# Patient Record
Sex: MALE | Race: WHITE | Employment: OTHER | URBAN - METROPOLITAN AREA
[De-identification: names, ages, dates, MRNs, and addresses within clinical notes are randomized per-mention and may not be internally consistent; named-entity substitution may affect disease eponyms.]

---

## 2017-01-05 RX ORDER — LEVOTHYROXINE SODIUM 88 UG/1
88 TABLET ORAL
Qty: 90 TABLET | Refills: 0 | Status: SHIPPED | OUTPATIENT
Start: 2017-01-05 | End: 2017-04-17

## 2017-01-25 ENCOUNTER — HOSPITAL ENCOUNTER (OUTPATIENT)
Dept: CV DIAGNOSTICS | Age: 72
Discharge: HOME | End: 2017-01-25
Attending: INTERNAL MEDICINE

## 2017-01-25 ENCOUNTER — MYAURORA ACCOUNT LINK (OUTPATIENT)
Dept: OTHER | Age: 72
End: 2017-01-25

## 2017-01-25 ENCOUNTER — PRIOR ORIGINAL RECORDS (OUTPATIENT)
Dept: OTHER | Age: 72
End: 2017-01-25

## 2017-01-25 DIAGNOSIS — I65.23 BILATERAL CAROTID ARTERY STENOSIS: ICD-10-CM

## 2017-01-26 ENCOUNTER — TELEPHONE (OUTPATIENT)
Dept: FAMILY MEDICINE CLINIC | Facility: CLINIC | Age: 72
End: 2017-01-26

## 2017-01-27 ENCOUNTER — MED REC SCAN ONLY (OUTPATIENT)
Dept: FAMILY MEDICINE CLINIC | Facility: CLINIC | Age: 72
End: 2017-01-27

## 2017-01-31 ENCOUNTER — PRIOR ORIGINAL RECORDS (OUTPATIENT)
Dept: OTHER | Age: 72
End: 2017-01-31

## 2017-02-20 ENCOUNTER — TELEPHONE (OUTPATIENT)
Dept: FAMILY MEDICINE CLINIC | Facility: CLINIC | Age: 72
End: 2017-02-20

## 2017-02-20 NOTE — TELEPHONE ENCOUNTER
Spoke with patient wife regarding patient form. She stated the form needs to filled out as well as a written letter stating the necessity for a brace due to stroke. Please advise.

## 2017-02-21 NOTE — TELEPHONE ENCOUNTER
Pt wife stated the letter needs to state pt had a stroke with right sided weakness and would benefit from a brace.

## 2017-02-27 ENCOUNTER — TELEPHONE (OUTPATIENT)
Dept: FAMILY MEDICINE CLINIC | Facility: CLINIC | Age: 72
End: 2017-02-27

## 2017-03-24 ENCOUNTER — TELEPHONE (OUTPATIENT)
Dept: FAMILY MEDICINE CLINIC | Facility: CLINIC | Age: 72
End: 2017-03-24

## 2017-03-24 PROBLEM — E66.01 SEVERE OBESITY (BMI 35.0-39.9) WITH COMORBIDITY (HCC): Chronic | Status: ACTIVE | Noted: 2017-03-24

## 2017-03-24 NOTE — TELEPHONE ENCOUNTER
Spoke with Donnie Self from [employer]. She stated a physcial medication script is necessary to get the compression stockings along with the quantity of stockings we would like to order. Script cannot be faxed.   Patient needs to schedule an appointment to get measur

## 2017-04-17 RX ORDER — ATORVASTATIN CALCIUM 20 MG/1
20 TABLET, FILM COATED ORAL NIGHTLY
Qty: 90 TABLET | Refills: 2 | Status: SHIPPED | OUTPATIENT
Start: 2017-04-17 | End: 2018-01-22

## 2017-04-17 RX ORDER — LEVOTHYROXINE SODIUM 88 UG/1
88 TABLET ORAL
Qty: 90 TABLET | Refills: 2 | Status: SHIPPED | OUTPATIENT
Start: 2017-04-17 | End: 2017-09-05 | Stop reason: DRUGHIGH

## 2017-04-17 NOTE — TELEPHONE ENCOUNTER
LOV: 12/27/16 for annual physical  TSH: 12/27/16    Levothyroxine Sodium 88 MCG Oral Tab 90 tablet 0 1/5/2017       Sig :  Take 1 tablet (88 mcg total) by mouth before breakfast.       Route:   Oral       Atorvastatin Calcium 20 MG Oral Tab 90 tablet 3 5/5

## 2017-05-02 ENCOUNTER — PRIOR ORIGINAL RECORDS (OUTPATIENT)
Dept: OTHER | Age: 72
End: 2017-05-02

## 2017-05-02 ENCOUNTER — LAB ENCOUNTER (OUTPATIENT)
Dept: LAB | Age: 72
End: 2017-05-02
Attending: INTERNAL MEDICINE
Payer: MEDICARE

## 2017-05-02 DIAGNOSIS — N18.30 DIABETES MELLITUS WITH STAGE 3 CHRONIC KIDNEY DISEASE (HCC): ICD-10-CM

## 2017-05-02 DIAGNOSIS — E11.22 DIABETES MELLITUS WITH STAGE 3 CHRONIC KIDNEY DISEASE (HCC): ICD-10-CM

## 2017-05-02 DIAGNOSIS — I48.91 ATRIAL FIBRILLATION (HCC): Primary | ICD-10-CM

## 2017-05-02 PROCEDURE — 36415 COLL VENOUS BLD VENIPUNCTURE: CPT

## 2017-05-02 PROCEDURE — 83036 HEMOGLOBIN GLYCOSYLATED A1C: CPT

## 2017-05-02 PROCEDURE — 80053 COMPREHEN METABOLIC PANEL: CPT

## 2017-05-02 PROCEDURE — 80061 LIPID PANEL: CPT

## 2017-05-02 PROCEDURE — 84450 TRANSFERASE (AST) (SGOT): CPT

## 2017-05-02 PROCEDURE — 84443 ASSAY THYROID STIM HORMONE: CPT

## 2017-05-02 PROCEDURE — 84439 ASSAY OF FREE THYROXINE: CPT

## 2017-05-02 PROCEDURE — 84460 ALANINE AMINO (ALT) (SGPT): CPT

## 2017-05-03 LAB
ALT (SGPT): 22 U/L
AST (SGOT): 19 U/L
FREE T4: 1.6 MG/DL
THYROID STIMULATING HORMONE: 5.68 MLU/L

## 2017-05-15 ENCOUNTER — PRIOR ORIGINAL RECORDS (OUTPATIENT)
Dept: OTHER | Age: 72
End: 2017-05-15

## 2017-05-15 PROBLEM — E11.59 TYPE 2 DIABETES MELLITUS WITH OTHER CIRCULATORY COMPLICATION, WITH LONG-TERM CURRENT USE OF INSULIN (HCC): Status: ACTIVE | Noted: 2017-05-15

## 2017-05-15 PROBLEM — E11.42 TYPE 2 DIABETES MELLITUS WITH POLYNEUROPATHY (HCC): Status: ACTIVE | Noted: 2017-05-15

## 2017-05-15 PROBLEM — E78.2 MIXED HYPERLIPIDEMIA: Status: ACTIVE | Noted: 2017-05-15

## 2017-05-15 PROBLEM — Z79.4 TYPE 2 DIABETES MELLITUS WITH OTHER CIRCULATORY COMPLICATION, WITH LONG-TERM CURRENT USE OF INSULIN (HCC): Status: ACTIVE | Noted: 2017-05-15

## 2017-05-15 PROBLEM — I10 ESSENTIAL HYPERTENSION: Status: ACTIVE | Noted: 2017-05-15

## 2017-05-15 PROBLEM — E06.3 HASHIMOTO'S THYROIDITIS: Status: ACTIVE | Noted: 2017-05-15

## 2017-06-09 RX ORDER — FOSINOPRIL SODIUM 20 MG/1
TABLET ORAL
Qty: 90 TABLET | Refills: 3 | Status: SHIPPED | OUTPATIENT
Start: 2017-06-09 | End: 2018-04-17

## 2017-06-09 NOTE — TELEPHONE ENCOUNTER
LOV: 12/27/16 for annual health exam     Fosinopril Sodium 20 MG Oral Tab 90 tablet 3 7/22/2016      Sig :  Take 1 tablet (20 mg total) by mouth once daily.       Route:   Oral       Future Appointments  Date Time Provider Mariluz Sapp   9/5/2017 1:00

## 2017-06-20 RX ORDER — MIRTAZAPINE 15 MG/1
15 TABLET, FILM COATED ORAL NIGHTLY
Qty: 90 TABLET | Refills: 3 | Status: SHIPPED | OUTPATIENT
Start: 2017-06-20 | End: 2018-05-22

## 2017-06-20 RX ORDER — ESCITALOPRAM OXALATE 20 MG/1
20 TABLET ORAL DAILY
Qty: 90 TABLET | Refills: 1 | Status: CANCELLED | OUTPATIENT
Start: 2017-06-20

## 2017-06-20 NOTE — TELEPHONE ENCOUNTER
LM for patient - patient has not had lexapro filled since 2015?     LOV: 12/27/16 annual health exam     mirtazapine 15 MG Oral Tab 90 tablet 3 7/22/2016      Sig :  Take 1 tablet (15 mg total) by mouth nightly.       Route:   Oral        Escitalopram Oxala

## 2017-06-20 NOTE — TELEPHONE ENCOUNTER
Spoke with patient's wife regarding refill request for lexapro. Patient's wife stated he has not been on this medication for one year. Informed patient's wife that he would need an appointment with Dr. Nadia La before this medication can be re-prescribed.

## 2017-07-20 DIAGNOSIS — I10 ESSENTIAL HYPERTENSION, BENIGN: ICD-10-CM

## 2017-07-20 NOTE — TELEPHONE ENCOUNTER
Pt's wife called pt needs refills on Potassium Cl 10 mg, Diltiazem 180mg [facility] Would like sent to [facility].  #90

## 2017-07-20 NOTE — TELEPHONE ENCOUNTER
LOV: 12/27/16 for annual physical  BP at time of visit: 130/78    Metoprolol Succinate ER (TOPROL XL) 25 MG Oral Tablet 24 Hr 90 tablet 3 7/22/2016    Sig :  Take 1 tablet (25 mg total) by mouth daily.      Route:   Oral       Potassium Chloride ER 10 MEQ O

## 2017-07-21 RX ORDER — DILTIAZEM HYDROCHLORIDE 180 MG/1
180 CAPSULE, COATED, EXTENDED RELEASE ORAL DAILY
Qty: 90 CAPSULE | Refills: 3 | Status: SHIPPED | OUTPATIENT
Start: 2017-07-21 | End: 2018-08-03

## 2017-07-21 RX ORDER — POTASSIUM CHLORIDE 750 MG/1
10 TABLET, FILM COATED, EXTENDED RELEASE ORAL DAILY
Qty: 90 TABLET | Refills: 3 | Status: SHIPPED | OUTPATIENT
Start: 2017-07-21 | End: 2018-03-12

## 2017-07-21 RX ORDER — METOPROLOL SUCCINATE 25 MG/1
25 TABLET, EXTENDED RELEASE ORAL DAILY
Qty: 90 TABLET | Refills: 3 | Status: SHIPPED | OUTPATIENT
Start: 2017-07-21 | End: 2018-08-03

## 2017-09-05 ENCOUNTER — OFFICE VISIT (OUTPATIENT)
Dept: FAMILY MEDICINE CLINIC | Facility: CLINIC | Age: 72
End: 2017-09-05

## 2017-09-05 VITALS
OXYGEN SATURATION: 95 % | HEART RATE: 83 BPM | SYSTOLIC BLOOD PRESSURE: 144 MMHG | TEMPERATURE: 97 F | DIASTOLIC BLOOD PRESSURE: 78 MMHG | RESPIRATION RATE: 16 BRPM

## 2017-09-05 DIAGNOSIS — E06.3 HASHIMOTO'S THYROIDITIS: Chronic | ICD-10-CM

## 2017-09-05 DIAGNOSIS — E78.2 MIXED HYPERLIPIDEMIA: Chronic | ICD-10-CM

## 2017-09-05 DIAGNOSIS — N18.30 STAGE 3 CHRONIC KIDNEY DISEASE (HCC): Chronic | ICD-10-CM

## 2017-09-05 DIAGNOSIS — Z91.81 AT RISK FOR FALLING: Chronic | ICD-10-CM

## 2017-09-05 DIAGNOSIS — E11.22 DIABETES MELLITUS WITH STAGE 3 CHRONIC KIDNEY DISEASE (HCC): Chronic | ICD-10-CM

## 2017-09-05 DIAGNOSIS — Z00.00 ENCOUNTER FOR ANNUAL HEALTH EXAMINATION: Primary | ICD-10-CM

## 2017-09-05 DIAGNOSIS — N18.30 DIABETES MELLITUS WITH STAGE 3 CHRONIC KIDNEY DISEASE (HCC): Chronic | ICD-10-CM

## 2017-09-05 DIAGNOSIS — Z13.1 SCREENING FOR DIABETES MELLITUS (DM): ICD-10-CM

## 2017-09-05 DIAGNOSIS — F43.21 ADJUSTMENT DISORDER WITH DEPRESSED MOOD: Chronic | ICD-10-CM

## 2017-09-05 DIAGNOSIS — E66.01 SEVERE OBESITY (BMI 35.0-39.9) WITH COMORBIDITY (HCC): Chronic | ICD-10-CM

## 2017-09-05 DIAGNOSIS — I10 ESSENTIAL HYPERTENSION: Chronic | ICD-10-CM

## 2017-09-05 DIAGNOSIS — Z13.6 SCREENING FOR CARDIOVASCULAR CONDITION: ICD-10-CM

## 2017-09-05 DIAGNOSIS — Z23 NEED FOR VACCINATION: ICD-10-CM

## 2017-09-05 DIAGNOSIS — I48.91 ATRIAL FIBRILLATION WITH RAPID VENTRICULAR RESPONSE (HCC): Chronic | ICD-10-CM

## 2017-09-05 LAB
ALBUMIN SERPL-MCNC: 3.6 G/DL (ref 3.5–4.8)
ALP LIVER SERPL-CCNC: 130 U/L (ref 45–117)
ALT SERPL-CCNC: 27 U/L (ref 17–63)
AST SERPL-CCNC: 22 U/L (ref 15–41)
BASOPHILS # BLD AUTO: 0.1 X10(3) UL (ref 0–0.1)
BASOPHILS NFR BLD AUTO: 0.9 %
BILIRUB SERPL-MCNC: 1.3 MG/DL (ref 0.1–2)
BUN BLD-MCNC: 29 MG/DL (ref 8–20)
CALCIUM BLD-MCNC: 9.3 MG/DL (ref 8.3–10.3)
CHLORIDE: 110 MMOL/L (ref 101–111)
CHOLEST SMN-MCNC: 119 MG/DL (ref ?–200)
CK: 137 IU/L (ref 39–308)
CO2: 28 MMOL/L (ref 22–32)
COMPLEXED PSA SERPL-MCNC: 16.1 NG/ML (ref 0.01–4)
CREAT BLD-MCNC: 1.46 MG/DL (ref 0.7–1.3)
EOSINOPHIL # BLD AUTO: 0.35 X10(3) UL (ref 0–0.3)
EOSINOPHIL NFR BLD AUTO: 3.3 %
ERYTHROCYTE [DISTWIDTH] IN BLOOD BY AUTOMATED COUNT: 13.2 % (ref 11.5–16)
EST. AVERAGE GLUCOSE BLD GHB EST-MCNC: 166 MG/DL (ref 68–126)
GLUCOSE BLD-MCNC: 51 MG/DL (ref 70–99)
HBA1C MFR BLD HPLC: 7.4 % (ref ?–5.7)
HCT VFR BLD AUTO: 46.9 % (ref 37–53)
HDLC SERPL-MCNC: 56 MG/DL (ref 45–?)
HDLC SERPL: 2.13 {RATIO} (ref ?–4.97)
HGB BLD-MCNC: 15.2 G/DL (ref 13–17)
IMMATURE GRANULOCYTE COUNT: 0.06 X10(3) UL (ref 0–1)
IMMATURE GRANULOCYTE RATIO %: 0.6 %
LDLC SERPL CALC-MCNC: 54 MG/DL (ref ?–130)
LDLC SERPL-MCNC: 9 MG/DL (ref 5–40)
LYMPHOCYTES # BLD AUTO: 2.52 X10(3) UL (ref 0.9–4)
LYMPHOCYTES NFR BLD AUTO: 23.5 %
M PROTEIN MFR SERPL ELPH: 8 G/DL (ref 6.1–8.3)
MCH RBC QN AUTO: 32.1 PG (ref 27–33.2)
MCHC RBC AUTO-ENTMCNC: 32.4 G/DL (ref 31–37)
MCV RBC AUTO: 98.9 FL (ref 80–99)
MONOCYTES # BLD AUTO: 0.78 X10(3) UL (ref 0.1–0.6)
MONOCYTES NFR BLD AUTO: 7.3 %
NEUTROPHIL ABS PRELIM: 6.91 X10 (3) UL (ref 1.3–6.7)
NEUTROPHILS # BLD AUTO: 6.91 X10(3) UL (ref 1.3–6.7)
NEUTROPHILS NFR BLD AUTO: 64.4 %
NONHDLC SERPL-MCNC: 63 MG/DL (ref ?–130)
PLATELET # BLD AUTO: 234 10(3)UL (ref 150–450)
POTASSIUM SERPL-SCNC: 4 MMOL/L (ref 3.6–5.1)
RBC # BLD AUTO: 4.74 X10(6)UL (ref 3.8–5.8)
RED CELL DISTRIBUTION WIDTH-SD: 48.5 FL (ref 35.1–46.3)
SODIUM SERPL-SCNC: 145 MMOL/L (ref 136–144)
TRIGLYCERIDES: 44 MG/DL (ref ?–150)
TSI SER-ACNC: 3.11 MIU/ML (ref 0.35–5.5)
WBC # BLD AUTO: 10.7 X10(3) UL (ref 4–13)

## 2017-09-05 PROCEDURE — G0009 ADMIN PNEUMOCOCCAL VACCINE: HCPCS | Performed by: FAMILY MEDICINE

## 2017-09-05 PROCEDURE — 85025 COMPLETE CBC W/AUTO DIFF WBC: CPT | Performed by: FAMILY MEDICINE

## 2017-09-05 PROCEDURE — 90653 IIV ADJUVANT VACCINE IM: CPT | Performed by: FAMILY MEDICINE

## 2017-09-05 PROCEDURE — 80061 LIPID PANEL: CPT | Performed by: FAMILY MEDICINE

## 2017-09-05 PROCEDURE — 83036 HEMOGLOBIN GLYCOSYLATED A1C: CPT | Performed by: FAMILY MEDICINE

## 2017-09-05 PROCEDURE — G0103 PSA SCREENING: HCPCS | Performed by: FAMILY MEDICINE

## 2017-09-05 PROCEDURE — 84443 ASSAY THYROID STIM HORMONE: CPT | Performed by: FAMILY MEDICINE

## 2017-09-05 PROCEDURE — G0008 ADMIN INFLUENZA VIRUS VAC: HCPCS | Performed by: FAMILY MEDICINE

## 2017-09-05 PROCEDURE — 80053 COMPREHEN METABOLIC PANEL: CPT | Performed by: FAMILY MEDICINE

## 2017-09-05 PROCEDURE — 82550 ASSAY OF CK (CPK): CPT | Performed by: FAMILY MEDICINE

## 2017-09-05 PROCEDURE — 90670 PCV13 VACCINE IM: CPT | Performed by: FAMILY MEDICINE

## 2017-09-05 PROCEDURE — 96160 PT-FOCUSED HLTH RISK ASSMT: CPT | Performed by: FAMILY MEDICINE

## 2017-09-05 RX ORDER — ACETAMINOPHEN 500 MG
500 TABLET ORAL EVERY 6 HOURS PRN
COMMUNITY
End: 2018-03-12

## 2017-09-05 RX ORDER — CLOBETASOL PROPIONATE 0.5 MG/G
CREAM TOPICAL
Qty: 30 G | Refills: 0 | Status: SHIPPED | OUTPATIENT
Start: 2017-09-05 | End: 2018-03-12

## 2017-09-05 NOTE — PATIENT INSTRUCTIONS
Briana Escobar's SCREENING SCHEDULE   Tests on this list are recommended by your physician but may not be covered, or covered at this frequency, by your insurer. Please check with your insurance carrier before scheduling to verify coverage.     ASHLIE previous visit.  Limited to patients who meet one of the following criteria:   • Men who are 73-68 years old and have smoked more than 100 cigarettes in their lifetime   • Anyone with a family history    Colorectal Cancer Screening Covered up to Age 76 in visit on 09/05/17  -PNEUMOCOCCAL VACC, 13 ALEX IM   Orders placed or performed in visit on 12/27/16  -PNEUMOCOCCAL VACC, 13 ALEX IM    Please get once after your 65th birthday    Pneumococcal 23 (Pneumovax)  Covered Once after 65 No orders found for this

## 2017-09-05 NOTE — PROGRESS NOTES
HPI:   Nevin Euceda is a 74year old male who presents for a MA (Medicare Advantage) [facility] (Once per calendar year). Doing about the same. Not exercising - too difficult.    Mood still depressed - uses medication lexapro as needed but he fills LANCETS 30G Does not apply Misc Test 5 times daily. E11.65 insulin dependent   Metoprolol Succinate ER (TOPROL XL) 25 MG Oral Tablet 24 Hr Take 1 tablet (25 mg total) by mouth daily.    DilTIAZem HCl ER Coated Beads (CARTIA XT) 180 MG Oral Capsule SR 24 Hr (1/16/2012); Dysuria (1/21/2012); Essential hypertension, benign (2/7/2008); Fecal incontinence (2/16/2012); Heart disease; Hernia; High blood pressure; High cholesterol; Insomnia, unspecified (1/16/2012);  Ischiorectal abscess; Kidney disease; Knee pain, l asthma    EXAM:   /78 (BP Location: Right arm, Patient Position: Sitting, Cuff Size: adult)   Pulse 83   Temp (!) 97.3 °F (36.3 °C) (Temporal)   Resp 16   SpO2 95%   Estimated body mass index is 38.92 kg/m² as calculated from the following:    Height cardiovascular condition  -     LIPID PANEL; Future  -     CK CREATINE KINASE (NOT CREATININE);  Future    Screening for diabetes mellitus (DM)  -     HEMOGLOBIN A1C; Future    Need for vaccination  -     PNEUMOCOCCAL VACC, 13 ALEX IM    Severe obesity (BMI falling  Discussed safety at home and out of home. Mom and daughter also present and understand. 8. Atrial fibrillation with rapid ventricular response (HCC)  Stable. Continue anticoagulation and rate control.      9. Diabetes mellitus with stage 3  help    Driving: Cannot do without help    Preparing your meals: Need some help    Managing money/bills: Cannot do without help    Taking medications as prescribed: Able without help    Are you able to afford your medications?: Yes    Hearing Problems?: No HgbA1C (%)   Date Value   09/05/2017 7.4 (H)       No flowsheet data found.     Fasting Blood Sugar (FSB)Annually   Glucose (mg/dL)   Date Value   09/05/2017 51 (L)   ----------  GLUCOSE (mg/dL)   Date Value   06/13/2014 84   ----------       Cardiovasc or any previous visit. Hepatitis B No orders found for this or any previous visit. Tetanus No orders found for this or any previous visit.          [address] Internal Lab or Procedure External Lab or Procedure   Annual Monitoring o

## 2017-09-07 ENCOUNTER — PATIENT OUTREACH (OUTPATIENT)
Dept: CASE MANAGEMENT | Age: 72
End: 2017-09-07

## 2017-09-07 PROBLEM — E06.3 HASHIMOTO'S THYROIDITIS: Chronic | Status: ACTIVE | Noted: 2017-05-15

## 2017-09-07 PROBLEM — I10 ESSENTIAL HYPERTENSION: Chronic | Status: ACTIVE | Noted: 2017-05-15

## 2017-09-07 PROBLEM — N18.30 STAGE 3 CHRONIC KIDNEY DISEASE (HCC): Status: ACTIVE | Noted: 2017-09-07

## 2017-09-07 PROBLEM — N18.30 STAGE 3 CHRONIC KIDNEY DISEASE (HCC): Chronic | Status: ACTIVE | Noted: 2017-09-07

## 2017-09-07 PROBLEM — E78.2 MIXED HYPERLIPIDEMIA: Chronic | Status: ACTIVE | Noted: 2017-05-15

## 2017-09-07 PROBLEM — Z79.4 TYPE 2 DIABETES MELLITUS WITH OTHER CIRCULATORY COMPLICATION, WITH LONG-TERM CURRENT USE OF INSULIN (HCC): Status: RESOLVED | Noted: 2017-05-15 | Resolved: 2017-09-07

## 2017-09-07 PROBLEM — E11.59 TYPE 2 DIABETES MELLITUS WITH OTHER CIRCULATORY COMPLICATION, WITH LONG-TERM CURRENT USE OF INSULIN (HCC): Status: RESOLVED | Noted: 2017-05-15 | Resolved: 2017-09-07

## 2017-09-07 PROBLEM — E11.42 TYPE 2 DIABETES MELLITUS WITH POLYNEUROPATHY (HCC): Status: RESOLVED | Noted: 2017-05-15 | Resolved: 2017-09-07

## 2017-09-07 NOTE — PROGRESS NOTES
Contacted Pt to intro to CCM. Spoke to Pt spouse Miguel Benitez who is listed on the HIPPA regarding the program. She stated that [facility] already has this service.

## 2017-09-08 ENCOUNTER — MED REC SCAN ONLY (OUTPATIENT)
Dept: FAMILY MEDICINE CLINIC | Facility: CLINIC | Age: 72
End: 2017-09-08

## 2017-09-14 ENCOUNTER — TELEPHONE (OUTPATIENT)
Dept: FAMILY MEDICINE CLINIC | Facility: CLINIC | Age: 72
End: 2017-09-14

## 2017-09-18 ENCOUNTER — TELEPHONE (OUTPATIENT)
Dept: FAMILY MEDICINE CLINIC | Facility: CLINIC | Age: 72
End: 2017-09-18

## 2017-09-18 NOTE — TELEPHONE ENCOUNTER
PATIENTS DAUGHTER NICOLASA IS CALLING PATIENT WAS DX WITH CA AND SHE WANTS TO KNOW IF THEY CAN GET A REFERRAL TO SEE AND SPECIALIST FOR WHAT IS NEXT STEP.

## 2017-09-18 NOTE — TELEPHONE ENCOUNTER
RADHA Moraless with patient's wife regarding patient's upcoming care. Patient is still undecided about what he wants to do. Wife states, patient would like to see the urologist for a consult.   Patient's wife asked for contact information for

## 2017-10-27 RX ORDER — AMIODARONE HYDROCHLORIDE 100 MG/1
100 TABLET ORAL DAILY
Qty: 90 TABLET | Refills: 3 | OUTPATIENT
Start: 2017-10-27

## 2017-10-27 NOTE — TELEPHONE ENCOUNTER
Pt needs a refill on Amiodarone would like sent to [facility] pharmacy. #90 days 100mg

## 2017-10-27 NOTE — TELEPHONE ENCOUNTER
LOV: 9/5/17 encounter for annual health exam    Amiodarone HCl 100 MG Oral Tab 90 tablet 3 7/22/2016    Sig :  Take 1 tablet (100 mg total) by mouth daily.      Route:   Oral       Future Appointments  Date Time Provider Mariluz Sapp   11/13/2017 1:00

## 2017-11-03 ENCOUNTER — TELEPHONE (OUTPATIENT)
Dept: FAMILY MEDICINE CLINIC | Facility: CLINIC | Age: 72
End: 2017-11-03

## 2017-11-13 ENCOUNTER — PRIOR ORIGINAL RECORDS (OUTPATIENT)
Dept: OTHER | Age: 72
End: 2017-11-13

## 2017-11-13 PROBLEM — Z79.4 TYPE 2 DIABETES MELLITUS WITH BOTH EYES AFFECTED BY MILD NONPROLIFERATIVE RETINOPATHY WITHOUT MACULAR EDEMA, WITH LONG-TERM CURRENT USE OF INSULIN (HCC): Status: ACTIVE | Noted: 2017-11-13

## 2017-11-13 PROBLEM — E11.59 HYPERTENSION ASSOCIATED WITH DIABETES: Chronic | Status: ACTIVE | Noted: 2017-05-15

## 2017-11-13 PROBLEM — E11.69 HYPERLIPIDEMIA ASSOCIATED WITH TYPE 2 DIABETES MELLITUS: Status: ACTIVE | Noted: 2017-11-13

## 2017-11-13 PROBLEM — E78.5 HYPERLIPIDEMIA ASSOCIATED WITH TYPE 2 DIABETES MELLITUS  (HCC): Status: ACTIVE | Noted: 2017-11-13

## 2017-11-13 PROBLEM — E78.5 HYPERLIPIDEMIA ASSOCIATED WITH TYPE 2 DIABETES MELLITUS (HCC): Status: ACTIVE | Noted: 2017-11-13

## 2017-11-13 PROBLEM — E11.69 HYPERLIPIDEMIA ASSOCIATED WITH TYPE 2 DIABETES MELLITUS  (HCC): Status: ACTIVE | Noted: 2017-11-13

## 2017-11-13 PROBLEM — E11.69 HYPERLIPIDEMIA ASSOCIATED WITH TYPE 2 DIABETES MELLITUS (HCC): Status: ACTIVE | Noted: 2017-11-13

## 2017-11-13 PROBLEM — E78.5 HYPERLIPIDEMIA ASSOCIATED WITH TYPE 2 DIABETES MELLITUS: Status: ACTIVE | Noted: 2017-11-13

## 2017-11-13 PROBLEM — I15.2 HYPERTENSION ASSOCIATED WITH DIABETES (HCC): Chronic | Status: ACTIVE | Noted: 2017-05-15

## 2017-11-13 PROBLEM — I15.2 HYPERTENSION ASSOCIATED WITH DIABETES  (HCC): Chronic | Status: ACTIVE | Noted: 2017-05-15

## 2017-11-13 PROBLEM — R97.20 ELEVATED PSA: Status: ACTIVE | Noted: 2017-11-13

## 2017-11-13 PROBLEM — I15.2 HYPERTENSION ASSOCIATED WITH DIABETES: Chronic | Status: ACTIVE | Noted: 2017-05-15

## 2017-11-13 PROBLEM — E11.3293 TYPE 2 DIABETES MELLITUS WITH BOTH EYES AFFECTED BY MILD NONPROLIFERATIVE RETINOPATHY WITHOUT MACULAR EDEMA, WITH LONG-TERM CURRENT USE OF INSULIN (HCC): Status: ACTIVE | Noted: 2017-11-13

## 2017-11-13 PROBLEM — E11.59 HYPERTENSION ASSOCIATED WITH DIABETES (HCC): Chronic | Status: ACTIVE | Noted: 2017-05-15

## 2017-11-13 PROBLEM — E11.59 HYPERTENSION ASSOCIATED WITH DIABETES  (HCC): Chronic | Status: ACTIVE | Noted: 2017-05-15

## 2017-11-14 RX ORDER — AMIODARONE HYDROCHLORIDE 100 MG/1
100 TABLET ORAL DAILY
Qty: 90 TABLET | Refills: 3 | Status: SHIPPED | OUTPATIENT
Start: 2017-11-14 | End: 2018-10-30

## 2017-11-14 RX ORDER — AMIODARONE HYDROCHLORIDE 100 MG/1
100 TABLET ORAL DAILY
Qty: 90 TABLET | Refills: 3 | OUTPATIENT
Start: 2017-11-14

## 2017-11-14 NOTE — TELEPHONE ENCOUNTER
LOV: 9/5/17 for annual physical    Amiodarone HCl 100 MG Oral Tab 90 tablet 3 7/22/2016    Sig :  Take 1 tablet (100 mg total) by mouth daily. Route:   Oral       No future appointments. Please advise.

## 2017-11-14 NOTE — TELEPHONE ENCOUNTER
Spoke with patient's wife regarding this medication. This medication has not been discontinued. Wife states patient still takes this medication every day. Wife confirmed this with patient. Please advise.

## 2017-12-21 ENCOUNTER — MED REC SCAN ONLY (OUTPATIENT)
Dept: FAMILY MEDICINE CLINIC | Facility: CLINIC | Age: 72
End: 2017-12-21

## 2017-12-27 ENCOUNTER — TELEPHONE (OUTPATIENT)
Dept: FAMILY MEDICINE CLINIC | Facility: CLINIC | Age: 72
End: 2017-12-27

## 2017-12-27 NOTE — TELEPHONE ENCOUNTER
Pt's wife called stated Josiane Haywood was going to fax over paper work so he can get a new cushion for the seat. They need Dr. Ilana Dumont to fill this form out and fax it back to [employer] and they will send it to Medicare.

## 2017-12-27 NOTE — TELEPHONE ENCOUNTER
Called pt. We will ask Dr. Macarena Blackwell to fill out form and will faxed to [employer]. Pt advise Dr. Macarena Blackwell is not in office today and we will give them a call tomorrow.

## 2017-12-29 NOTE — TELEPHONE ENCOUNTER
Informed patient's wife no fax received from [employer]. Wife verbalized understanding. She is going to call the company and have them re-fax the form.

## 2018-01-22 RX ORDER — ATORVASTATIN CALCIUM 20 MG/1
TABLET, FILM COATED ORAL
Qty: 90 TABLET | Refills: 2 | Status: SHIPPED | OUTPATIENT
Start: 2018-01-22 | End: 2018-09-06

## 2018-01-22 RX ORDER — LEVOTHYROXINE SODIUM 88 UG/1
TABLET ORAL
Qty: 90 TABLET | Refills: 2 | Status: SHIPPED | OUTPATIENT
Start: 2018-01-22 | End: 2018-03-12

## 2018-01-22 NOTE — TELEPHONE ENCOUNTER
Atorvastatin Calcium 20 MG Oral Tab 90 tablet 2 4/17/2017     Levothyroxine Sodium 50 MCG Oral Tab 90 tablet 0 5/5/2016     Last labs 09/05/17.      TSH 0.350 - 5.500 mIU/mL 3.110      LDL Cholesterol <130 mg/dL 54    VLDL 5 - 40 mg/dL 9    Chol/HDL Ratio <

## 2018-02-14 ENCOUNTER — TELEPHONE (OUTPATIENT)
Dept: FAMILY MEDICINE CLINIC | Facility: CLINIC | Age: 73
End: 2018-02-14

## 2018-02-14 NOTE — TELEPHONE ENCOUNTER
Pt came today and drop off Certification for parking placard paper work from  to fill out by Dr. Mary Brown. RADHA   In Red folder. Please advise.

## 2018-02-22 ENCOUNTER — LAB ENCOUNTER (OUTPATIENT)
Dept: LAB | Age: 73
End: 2018-02-22
Attending: INTERNAL MEDICINE
Payer: MEDICARE

## 2018-02-22 ENCOUNTER — PRIOR ORIGINAL RECORDS (OUTPATIENT)
Dept: OTHER | Age: 73
End: 2018-02-22

## 2018-02-22 DIAGNOSIS — I10 HYPERTENSION: ICD-10-CM

## 2018-02-22 DIAGNOSIS — Z79.899 MEDICATION MANAGEMENT: ICD-10-CM

## 2018-02-22 DIAGNOSIS — Z00.00 ROUTINE ADULT HEALTH MAINTENANCE: Primary | ICD-10-CM

## 2018-02-22 LAB
ALBUMIN SERPL-MCNC: 3.5 G/DL (ref 3.5–4.8)
ALP LIVER SERPL-CCNC: 129 U/L (ref 45–117)
ALT SERPL-CCNC: 23 U/L (ref 17–63)
AST SERPL-CCNC: 21 U/L (ref 15–41)
BILIRUB SERPL-MCNC: 1.2 MG/DL (ref 0.1–2)
BUN BLD-MCNC: 21 MG/DL (ref 8–20)
CALCIUM BLD-MCNC: 9.1 MG/DL (ref 8.3–10.3)
CHLORIDE: 107 MMOL/L (ref 101–111)
CHOLEST SMN-MCNC: 131 MG/DL (ref ?–200)
CO2: 26 MMOL/L (ref 22–32)
CREAT BLD-MCNC: 1.33 MG/DL (ref 0.7–1.3)
EST. AVERAGE GLUCOSE BLD GHB EST-MCNC: 180 MG/DL (ref 68–126)
FREE T4: 1.3 NG/DL (ref 0.9–1.8)
GLUCOSE BLD-MCNC: 85 MG/DL (ref 70–99)
HBA1C MFR BLD HPLC: 7.9 % (ref ?–5.7)
HDLC SERPL-MCNC: 61 MG/DL (ref 45–?)
HDLC SERPL: 2.15 {RATIO} (ref ?–4.97)
LDLC SERPL CALC-MCNC: 58 MG/DL (ref ?–130)
M PROTEIN MFR SERPL ELPH: 7.6 G/DL (ref 6.1–8.3)
NONHDLC SERPL-MCNC: 70 MG/DL (ref ?–130)
POTASSIUM SERPL-SCNC: 3.4 MMOL/L (ref 3.6–5.1)
SODIUM SERPL-SCNC: 141 MMOL/L (ref 136–144)
TRIGL SERPL-MCNC: 59 MG/DL (ref ?–150)
TSI SER-ACNC: 5.77 MIU/ML (ref 0.35–5.5)
VLDLC SERPL CALC-MCNC: 12 MG/DL (ref 5–40)

## 2018-02-22 PROCEDURE — 84439 ASSAY OF FREE THYROXINE: CPT

## 2018-02-22 PROCEDURE — 83036 HEMOGLOBIN GLYCOSYLATED A1C: CPT

## 2018-02-22 PROCEDURE — 84443 ASSAY THYROID STIM HORMONE: CPT

## 2018-02-22 PROCEDURE — 84460 ALANINE AMINO (ALT) (SGPT): CPT

## 2018-02-22 PROCEDURE — 80053 COMPREHEN METABOLIC PANEL: CPT

## 2018-02-22 PROCEDURE — 36415 COLL VENOUS BLD VENIPUNCTURE: CPT

## 2018-02-22 PROCEDURE — 80061 LIPID PANEL: CPT

## 2018-02-22 PROCEDURE — 84450 TRANSFERASE (AST) (SGOT): CPT

## 2018-02-23 ENCOUNTER — PRIOR ORIGINAL RECORDS (OUTPATIENT)
Dept: OTHER | Age: 73
End: 2018-02-23

## 2018-02-26 LAB
ALBUMIN: 3.5 G/DL
ALKALINE PHOSPHATATE(ALK PHOS): 129 IU/L
BILIRUBIN TOTAL: 1.2 MG/DL
BUN: 21 MG/DL
CALCIUM: 9.1 MG/DL
CHLORIDE: 107 MEQ/L
CHOLESTEROL, TOTAL: 131 MG/DL
CREATININE, SERUM: 1.33 MG/DL
FREE T4: 1.3 MG/DL
GLUCOSE: 85 MG/DL
HDL CHOLESTEROL: 61 MG/DL
HEMOGLOBIN A1C: 7.9 %
LDL CHOLESTEROL: 58 MG/DL
POTASSIUM, SERUM: 3.4 MEQ/L
PROTEIN, TOTAL: 7.6 G/DL
SGOT (AST): 21 IU/L
SGPT (ALT): 23 IU/L
SODIUM: 141 MEQ/L
THYROID STIMULATING HORMONE: 5.77 MLU/L
TRIGLYCERIDES: 59 MG/DL

## 2018-03-12 ENCOUNTER — OFFICE VISIT (OUTPATIENT)
Dept: FAMILY MEDICINE CLINIC | Facility: CLINIC | Age: 73
End: 2018-03-12

## 2018-03-12 VITALS
HEIGHT: 73 IN | SYSTOLIC BLOOD PRESSURE: 128 MMHG | DIASTOLIC BLOOD PRESSURE: 84 MMHG | RESPIRATION RATE: 18 BRPM | TEMPERATURE: 99 F | HEART RATE: 70 BPM | OXYGEN SATURATION: 97 %

## 2018-03-12 DIAGNOSIS — I63.59 CEREBROVASCULAR ACCIDENT (CVA) DUE TO OCCLUSION OF OTHER CEREBRAL ARTERY (HCC): Primary | ICD-10-CM

## 2018-03-12 DIAGNOSIS — G82.20 PARAPLEGIA (HCC): ICD-10-CM

## 2018-03-12 PROCEDURE — 99213 OFFICE O/P EST LOW 20 MIN: CPT | Performed by: FAMILY MEDICINE

## 2018-03-12 NOTE — PROGRESS NOTES
CC: mobility examination    HPI:     Pt had stroke in 2010 with severe residual right kiko-deficit in the upper and lower extremities. Cannot ambulate.  Standing is only for very limited period of times, less than 5 minutes This has severely limited his ADL cholesterol    • Insomnia, unspecified 1/16/2012   • Ischiorectal abscess    • Kidney disease    • Knee pain, left    • Obesity, unspecified 2/7/2008   • Orthostatic hypotension 1/16/2012   • Other and unspecified hyperlipidemia 2/7/2008   • Pain in joint,

## 2018-03-19 ENCOUNTER — HOSPITAL ENCOUNTER (OUTPATIENT)
Dept: CARDIOLOGY CLINIC | Facility: HOSPITAL | Age: 73
Discharge: HOME | End: 2018-03-19
Attending: INTERNAL MEDICINE

## 2018-03-19 DIAGNOSIS — I65.23 BILATERAL CAROTID ARTERY STENOSIS: ICD-10-CM

## 2018-03-20 ENCOUNTER — PRIOR ORIGINAL RECORDS (OUTPATIENT)
Dept: OTHER | Age: 73
End: 2018-03-20

## 2018-03-20 ENCOUNTER — TELEPHONE (OUTPATIENT)
Dept: FAMILY MEDICINE CLINIC | Facility: CLINIC | Age: 73
End: 2018-03-20

## 2018-03-21 ENCOUNTER — PRIOR ORIGINAL RECORDS (OUTPATIENT)
Dept: OTHER | Age: 73
End: 2018-03-21

## 2018-03-26 ENCOUNTER — PRIOR ORIGINAL RECORDS (OUTPATIENT)
Dept: OTHER | Age: 73
End: 2018-03-26

## 2018-03-29 ENCOUNTER — TELEPHONE (OUTPATIENT)
Dept: FAMILY MEDICINE CLINIC | Facility: CLINIC | Age: 73
End: 2018-03-29

## 2018-03-29 NOTE — TELEPHONE ENCOUNTER
Pt spouse stated new [employer] chair that is being ordered is based on the weight that was put on the form Dr. Erickson Prior filled out.     She would like to talk to Dr. Erickson Prior about changing the Pt weight on the form, as new chair will not fit in the living sp

## 2018-03-29 NOTE — TELEPHONE ENCOUNTER
Spoke to patient's wife regarding [employer] chair. Informed patient that we can not change weight on form until we get a current weight on patient. Patient was not weighed on last office visit.      Wife states he cannot get on our scale at our office due t

## 2018-03-30 ENCOUNTER — TELEPHONE (OUTPATIENT)
Dept: FAMILY MEDICINE CLINIC | Facility: CLINIC | Age: 73
End: 2018-03-30

## 2018-03-30 NOTE — TELEPHONE ENCOUNTER
Letter and forms faxed to [facility] [phone]  Confirmation faxed received. Forms and confirmation sent to scanning.

## 2018-04-12 ENCOUNTER — MED REC SCAN ONLY (OUTPATIENT)
Dept: FAMILY MEDICINE CLINIC | Facility: CLINIC | Age: 73
End: 2018-04-12

## 2018-04-17 RX ORDER — FOSINOPRIL SODIUM 20 MG/1
20 TABLET ORAL
Qty: 90 TABLET | Refills: 3 | Status: SHIPPED | OUTPATIENT
Start: 2018-04-17 | End: 2019-05-09

## 2018-04-17 NOTE — TELEPHONE ENCOUNTER
Pt needs a refill on Fosinopril needs to go to [facility] with 3 refills.

## 2018-04-17 NOTE — TELEPHONE ENCOUNTER
LOV: 3/12/18 for CVA    FOSINOPRIL SODIUM 20 MG Oral Tab 90 tablet 3 6/9/2017    Sig : Rosalva Geo 1 TABLET EVERY DAY     Route:   (none)       Future Appointments  Date Time Provider Mariluz Sapp   5/15/2018 1:00 PM Rut Deutsch DO EM[facility] Carmine Lowe

## 2018-05-15 ENCOUNTER — OFFICE VISIT (OUTPATIENT)
Dept: FAMILY MEDICINE CLINIC | Facility: CLINIC | Age: 73
End: 2018-05-15

## 2018-05-15 VITALS
OXYGEN SATURATION: 96 % | RESPIRATION RATE: 20 BRPM | WEIGHT: 280 LBS | HEART RATE: 88 BPM | DIASTOLIC BLOOD PRESSURE: 66 MMHG | TEMPERATURE: 99 F | BODY MASS INDEX: 37 KG/M2 | SYSTOLIC BLOOD PRESSURE: 128 MMHG

## 2018-05-15 DIAGNOSIS — I48.91 ATRIAL FIBRILLATION WITH RAPID VENTRICULAR RESPONSE (HCC): ICD-10-CM

## 2018-05-15 DIAGNOSIS — N18.30 STAGE 3 CHRONIC KIDNEY DISEASE (HCC): Chronic | ICD-10-CM

## 2018-05-15 DIAGNOSIS — E78.5 HYPERLIPIDEMIA ASSOCIATED WITH TYPE 2 DIABETES MELLITUS (HCC): ICD-10-CM

## 2018-05-15 DIAGNOSIS — I15.2 HYPERTENSION ASSOCIATED WITH DIABETES (HCC): Chronic | ICD-10-CM

## 2018-05-15 DIAGNOSIS — Z00.00 ENCOUNTER FOR ANNUAL HEALTH EXAMINATION: ICD-10-CM

## 2018-05-15 DIAGNOSIS — E11.69 HYPERLIPIDEMIA ASSOCIATED WITH TYPE 2 DIABETES MELLITUS (HCC): ICD-10-CM

## 2018-05-15 DIAGNOSIS — N18.6 TYPE 2 DIABETES MELLITUS WITH ESRD (END-STAGE RENAL DISEASE) (HCC): ICD-10-CM

## 2018-05-15 DIAGNOSIS — E11.59 HYPERTENSION ASSOCIATED WITH DIABETES (HCC): Chronic | ICD-10-CM

## 2018-05-15 DIAGNOSIS — Z00.00 GENERAL MEDICAL EXAM: Primary | ICD-10-CM

## 2018-05-15 DIAGNOSIS — E11.22 TYPE 2 DIABETES MELLITUS WITH ESRD (END-STAGE RENAL DISEASE) (HCC): ICD-10-CM

## 2018-05-15 DIAGNOSIS — E66.01 SEVERE OBESITY (BMI 35.0-39.9) WITH COMORBIDITY (HCC): Chronic | ICD-10-CM

## 2018-05-15 DIAGNOSIS — R97.20 ELEVATED PSA: ICD-10-CM

## 2018-05-15 PROBLEM — Z79.4 TYPE 2 DIABETES MELLITUS WITH OTHER CIRCULATORY COMPLICATION, WITH LONG-TERM CURRENT USE OF INSULIN (HCC): Status: RESOLVED | Noted: 2017-05-15 | Resolved: 2018-05-15

## 2018-05-15 PROBLEM — Z79.4 TYPE 2 DIABETES MELLITUS WITH BOTH EYES AFFECTED BY MILD NONPROLIFERATIVE RETINOPATHY WITHOUT MACULAR EDEMA, WITH LONG-TERM CURRENT USE OF INSULIN (HCC): Status: RESOLVED | Noted: 2017-11-13 | Resolved: 2018-05-15

## 2018-05-15 PROBLEM — E06.3 HASHIMOTO'S THYROIDITIS: Chronic | Status: RESOLVED | Noted: 2017-05-15 | Resolved: 2018-05-15

## 2018-05-15 PROBLEM — E78.2 MIXED HYPERLIPIDEMIA: Chronic | Status: RESOLVED | Noted: 2017-05-15 | Resolved: 2018-05-15

## 2018-05-15 PROBLEM — E11.42 TYPE 2 DIABETES MELLITUS WITH POLYNEUROPATHY (HCC): Status: RESOLVED | Noted: 2017-05-15 | Resolved: 2018-05-15

## 2018-05-15 PROBLEM — E11.3293 TYPE 2 DIABETES MELLITUS WITH BOTH EYES AFFECTED BY MILD NONPROLIFERATIVE RETINOPATHY WITHOUT MACULAR EDEMA, WITH LONG-TERM CURRENT USE OF INSULIN (HCC): Status: RESOLVED | Noted: 2017-11-13 | Resolved: 2018-05-15

## 2018-05-15 PROCEDURE — G0439 PPPS, SUBSEQ VISIT: HCPCS | Performed by: FAMILY MEDICINE

## 2018-05-15 PROCEDURE — 96160 PT-FOCUSED HLTH RISK ASSMT: CPT | Performed by: FAMILY MEDICINE

## 2018-05-15 RX ORDER — TORSEMIDE 20 MG/1
20 TABLET ORAL DAILY
Qty: 90 TABLET | Refills: 1 | Status: SHIPPED | OUTPATIENT
Start: 2018-05-15 | End: 2018-09-06

## 2018-05-15 RX ORDER — LEVOTHYROXINE SODIUM 88 UG/1
88 TABLET ORAL
COMMUNITY
Start: 2018-04-14 | End: 2018-10-30

## 2018-05-15 NOTE — PATIENT INSTRUCTIONS
Sara Escobar's SCREENING SCHEDULE   Tests on this list are recommended by your physician but may not be covered, or covered at this frequency, by your insurer. Please check with your insurance carrier before scheduling to verify coverage.     PREVENTAT visit.  Limited to patients who meet one of the following criteria:   • Men who are 73-68 years old and have smoked more than 100 cigarettes in their lifetime   • Anyone with a family history    Colorectal Cancer Screening Covered up to Age 76     Colonosco visit on 09/05/17  -PNEUMOCOCCAL VACC, 13 ALEX IM   Orders placed or performed in visit on 12/27/16  -PNEUMOCOCCAL VACC, 13 ALEX IM    Please get once after your 65th birthday    Pneumococcal 23 (Pneumovax)  Covered Once after 65 No orders found for this or

## 2018-05-15 NOTE — PROGRESS NOTES
HPI:   Rosemary Sanchez is a 74year old male who presents for a MA (Medicare Advantage) [facility] (Once per calendar year). Generally doing ok.      Annual Physical due on 09/05/2018        Fall/Risk Assessment   He has been screened for Falls and is have a POA but we do NOT have it on file in [address] [place] [address] Rd. Not Discussed           He smoked tobacco in the past but quit greater than 12 months ago.   Smoking status: Former Smoker                                                              Packs/day: 0.00 He  has a past medical history of Adjustment disorder with depressed mood (3/25/2011); CAD (coronary artery disease) (4/7/2008); Calculus of gallbladder without mention of cholecystitis or obstruction (4/7/2008);  Cholelithiasis; Depression; Depressive di Estimated body mass index is 36.94 kg/m² as calculated from the following:    Height as of 3/12/18: 73\". Weight as of this encounter: 280 lb.     Medicare Hearing Assessment  (Required for AWV/SWV)    Finger Rub          Visual Acuity  Right Eye Visual getting along fine for him. He declines any colon cancer screening. His PSA is known to be significantly elevated and he declines any further interventions on that.  He has longstanding cardiovascular care and any further screenings he will discuss with his HGBA1C (%)   Date Value   06/13/2014 7.9 (H)     HgbA1C (%)   Date Value   02/22/2018 7.9 (H)       No flowsheet data found.     Fasting Blood Sugar (FSB)Annually   Glucose (mg/dL)   Date Value   02/22/2018 85   ----------  GLUCOSE (mg/dL)   Date Value   06 Medicare Part B) No vaccine history found This may be covered with your prescription benefits, but Medicare does not cover unless Medically needed    Zoster   Not covered by Medicare Part B No vaccine history found This may be covered with your pharmacy  p

## 2018-05-22 ENCOUNTER — TELEPHONE (OUTPATIENT)
Dept: FAMILY MEDICINE CLINIC | Facility: CLINIC | Age: 73
End: 2018-05-22

## 2018-06-19 ENCOUNTER — TELEPHONE (OUTPATIENT)
Dept: FAMILY MEDICINE CLINIC | Facility: CLINIC | Age: 73
End: 2018-06-19

## 2018-06-19 NOTE — TELEPHONE ENCOUNTER
Patient completed eye exam 6/18/18  [facility]  Leanne Ricarda OD  Non proliferative diabetic retinopathy  Patients chart updated. Recall in epic.

## 2018-07-03 ENCOUNTER — MED REC SCAN ONLY (OUTPATIENT)
Dept: FAMILY MEDICINE CLINIC | Facility: CLINIC | Age: 73
End: 2018-07-03

## 2018-07-05 ENCOUNTER — TELEPHONE (OUTPATIENT)
Dept: FAMILY MEDICINE CLINIC | Facility: CLINIC | Age: 73
End: 2018-07-05

## 2018-07-05 NOTE — TELEPHONE ENCOUNTER
Spouse stated Ranjana Cervantes taking over Pt diabetic care. Wants to know when he is due for labs to monitor his diabetes. Feels he is over due.

## 2018-07-06 NOTE — TELEPHONE ENCOUNTER
Patient advised and verbalized understanding.   Future Appointments  Date Time Provider Mariluz Sapp   7/12/2018 11:00 AM EM[facility] OSWEGO NURSE EM[facility] Ilda Regan

## 2018-07-12 ENCOUNTER — NURSE ONLY (OUTPATIENT)
Dept: FAMILY MEDICINE CLINIC | Facility: CLINIC | Age: 73
End: 2018-07-12

## 2018-07-12 DIAGNOSIS — Z00.00 GENERAL MEDICAL EXAM: Primary | ICD-10-CM

## 2018-07-12 LAB
25-HYDROXYVITAMIN D (TOTAL): 45.2 NG/ML (ref 30–100)
ALBUMIN SERPL-MCNC: 3.4 G/DL (ref 3.5–4.8)
ALP LIVER SERPL-CCNC: 125 U/L (ref 45–117)
ALT SERPL-CCNC: 26 U/L (ref 17–63)
AST SERPL-CCNC: 18 U/L (ref 15–41)
BASOPHILS # BLD AUTO: 0.12 X10(3) UL (ref 0–0.1)
BASOPHILS NFR BLD AUTO: 1.2 %
BILIRUB SERPL-MCNC: 1 MG/DL (ref 0.1–2)
BUN BLD-MCNC: 28 MG/DL (ref 8–20)
CALCIUM BLD-MCNC: 9.4 MG/DL (ref 8.3–10.3)
CHLORIDE: 109 MMOL/L (ref 101–111)
CHOLEST SMN-MCNC: 129 MG/DL (ref ?–200)
CO2: 26 MMOL/L (ref 22–32)
CREAT BLD-MCNC: 1.59 MG/DL (ref 0.7–1.3)
CREAT UR-SCNC: 23.1 MG/DL
EOSINOPHIL # BLD AUTO: 0.53 X10(3) UL (ref 0–0.3)
EOSINOPHIL NFR BLD AUTO: 5.4 %
ERYTHROCYTE [DISTWIDTH] IN BLOOD BY AUTOMATED COUNT: 13.2 % (ref 11.5–16)
EST. AVERAGE GLUCOSE BLD GHB EST-MCNC: 171 MG/DL (ref 68–126)
GLUCOSE BLD-MCNC: 81 MG/DL (ref 70–99)
HBA1C MFR BLD HPLC: 7.6 % (ref ?–5.7)
HCT VFR BLD AUTO: 45.8 % (ref 37–53)
HDLC SERPL-MCNC: 51 MG/DL (ref 45–?)
HDLC SERPL: 2.53 {RATIO} (ref ?–4.97)
HGB BLD-MCNC: 14.5 G/DL (ref 13–17)
IMMATURE GRANULOCYTE COUNT: 0.03 X10(3) UL (ref 0–1)
IMMATURE GRANULOCYTE RATIO %: 0.3 %
LDLC SERPL CALC-MCNC: 66 MG/DL (ref ?–130)
LYMPHOCYTES # BLD AUTO: 2.25 X10(3) UL (ref 0.9–4)
LYMPHOCYTES NFR BLD AUTO: 23 %
M PROTEIN MFR SERPL ELPH: 7.8 G/DL (ref 6.1–8.3)
MCH RBC QN AUTO: 30.9 PG (ref 27–33.2)
MCHC RBC AUTO-ENTMCNC: 31.7 G/DL (ref 31–37)
MCV RBC AUTO: 97.7 FL (ref 80–99)
MICROALBUMIN UR-MCNC: 2.38 MG/DL
MICROALBUMIN/CREAT 24H UR-RTO: 103 UG/MG (ref ?–30)
MONOCYTES # BLD AUTO: 0.69 X10(3) UL (ref 0.1–1)
MONOCYTES NFR BLD AUTO: 7.1 %
NEUTROPHIL ABS PRELIM: 6.15 X10 (3) UL (ref 1.3–6.7)
NEUTROPHILS # BLD AUTO: 6.15 X10(3) UL (ref 1.3–6.7)
NEUTROPHILS NFR BLD AUTO: 63 %
NONHDLC SERPL-MCNC: 78 MG/DL (ref ?–130)
PLATELET # BLD AUTO: 239 10(3)UL (ref 150–450)
POTASSIUM SERPL-SCNC: 3.8 MMOL/L (ref 3.6–5.1)
RBC # BLD AUTO: 4.69 X10(6)UL (ref 3.8–5.8)
RED CELL DISTRIBUTION WIDTH-SD: 46.9 FL (ref 35.1–46.3)
SODIUM SERPL-SCNC: 146 MMOL/L (ref 136–144)
TRIGL SERPL-MCNC: 59 MG/DL (ref ?–150)
TSI SER-ACNC: 5.28 MIU/ML (ref 0.35–5.5)
VLDLC SERPL CALC-MCNC: 12 MG/DL (ref 5–40)
WBC # BLD AUTO: 9.8 X10(3) UL (ref 4–13)

## 2018-07-12 PROCEDURE — 80053 COMPREHEN METABOLIC PANEL: CPT | Performed by: FAMILY MEDICINE

## 2018-07-12 PROCEDURE — 84443 ASSAY THYROID STIM HORMONE: CPT | Performed by: FAMILY MEDICINE

## 2018-07-12 PROCEDURE — 82043 UR ALBUMIN QUANTITATIVE: CPT | Performed by: FAMILY MEDICINE

## 2018-07-12 PROCEDURE — 85025 COMPLETE CBC W/AUTO DIFF WBC: CPT | Performed by: FAMILY MEDICINE

## 2018-07-12 PROCEDURE — 36415 COLL VENOUS BLD VENIPUNCTURE: CPT | Performed by: FAMILY MEDICINE

## 2018-07-12 PROCEDURE — 82570 ASSAY OF URINE CREATININE: CPT | Performed by: FAMILY MEDICINE

## 2018-07-12 PROCEDURE — 82306 VITAMIN D 25 HYDROXY: CPT | Performed by: FAMILY MEDICINE

## 2018-07-12 PROCEDURE — 80061 LIPID PANEL: CPT | Performed by: FAMILY MEDICINE

## 2018-07-12 PROCEDURE — 83036 HEMOGLOBIN GLYCOSYLATED A1C: CPT | Performed by: FAMILY MEDICINE

## 2018-07-12 NOTE — PROGRESS NOTES
Pt here x fasting labs, cbc,cmp,lipid,tsh, vit d ,a1c and micro urine performed by [facility] MA w/ no accident reported, onLT arm. pt tolerate.

## 2018-07-23 ENCOUNTER — TELEPHONE (OUTPATIENT)
Dept: FAMILY MEDICINE CLINIC | Facility: CLINIC | Age: 73
End: 2018-07-23

## 2018-08-03 DIAGNOSIS — I10 ESSENTIAL HYPERTENSION, BENIGN: ICD-10-CM

## 2018-08-03 NOTE — TELEPHONE ENCOUNTER
Pt needs a refill on Warfarin,Metoprolol and Diltiazem would like sent to [facility] mail order pharmacy.

## 2018-08-03 NOTE — TELEPHONE ENCOUNTER
LOV- 5-  Last refills-  Warfarin: 7/13/2015 #30 with no refills   Metoprolol: 7/21/2017 #90 with 3 refills  Diltiazem:  7/21/2017 #90 with 3 refills  Labs: 7/12/2018  Medications pending in encounter.

## 2018-08-06 RX ORDER — WARFARIN SODIUM 4 MG/1
4 TABLET ORAL DAILY
Qty: 30 TABLET | Refills: 0 | Status: SHIPPED | OUTPATIENT
Start: 2018-08-06 | End: 2018-09-06

## 2018-08-06 RX ORDER — DILTIAZEM HYDROCHLORIDE 180 MG/1
180 CAPSULE, COATED, EXTENDED RELEASE ORAL DAILY
Qty: 90 CAPSULE | Refills: 3 | Status: SHIPPED | OUTPATIENT
Start: 2018-08-06 | End: 2019-07-30

## 2018-08-06 RX ORDER — METOPROLOL SUCCINATE 25 MG/1
25 TABLET, EXTENDED RELEASE ORAL DAILY
Qty: 90 TABLET | Refills: 3 | Status: SHIPPED | OUTPATIENT
Start: 2018-08-06 | End: 2019-07-30

## 2018-08-22 DIAGNOSIS — N18.30 DIABETES MELLITUS WITH STAGE 3 CHRONIC KIDNEY DISEASE (HCC): ICD-10-CM

## 2018-08-22 DIAGNOSIS — E11.22 DIABETES MELLITUS WITH STAGE 3 CHRONIC KIDNEY DISEASE (HCC): ICD-10-CM

## 2018-08-22 DIAGNOSIS — E78.2 MIXED HYPERLIPIDEMIA: ICD-10-CM

## 2018-08-22 DIAGNOSIS — I10 ESSENTIAL HYPERTENSION WITH GOAL BLOOD PRESSURE LESS THAN 140/90: ICD-10-CM

## 2018-08-22 RX ORDER — INSULIN GLARGINE 100 [IU]/ML
INJECTION, SOLUTION SUBCUTANEOUS
Qty: 45 PEN | Refills: 1 | Status: SHIPPED | OUTPATIENT
Start: 2018-08-22 | End: 2018-09-06

## 2018-08-22 NOTE — TELEPHONE ENCOUNTER
Lantus refilled 5/2/18 #45 pen with 1 refill  humalog refilled 1/18/18 #75mL with 0 refills  Last OV: 5/15/18  Meds pended-[facility] mail order pharmacy  Future Appointments  Date Time Provider Mariluz Sapp   10/1/2018 1:00 PM DO CHIARA Chu EM

## 2018-08-22 NOTE — TELEPHONE ENCOUNTER
Pt needs a refill on Syllom41 units just in the evening, Humalog sliding scale and BD Pen needle 29g and he usually gets 90 pens. 90 day supply on Lantus and Humalog as well. Would like sent to [facility] order.

## 2018-09-06 ENCOUNTER — APPOINTMENT (OUTPATIENT)
Dept: CT IMAGING | Facility: HOSPITAL | Age: 73
DRG: 683 | End: 2018-09-06
Attending: EMERGENCY MEDICINE
Payer: MEDICARE

## 2018-09-06 ENCOUNTER — PRIOR ORIGINAL RECORDS (OUTPATIENT)
Dept: OTHER | Age: 73
End: 2018-09-06

## 2018-09-06 ENCOUNTER — APPOINTMENT (OUTPATIENT)
Dept: GENERAL RADIOLOGY | Facility: HOSPITAL | Age: 73
DRG: 683 | End: 2018-09-06
Attending: EMERGENCY MEDICINE
Payer: MEDICARE

## 2018-09-06 ENCOUNTER — HOSPITAL ENCOUNTER (INPATIENT)
Facility: HOSPITAL | Age: 73
LOS: 2 days | Discharge: HOME | DRG: 683 | End: 2018-09-08
Attending: EMERGENCY MEDICINE | Admitting: HOSPITALIST
Payer: MEDICARE

## 2018-09-06 DIAGNOSIS — R79.1 SUPRATHERAPEUTIC INR: ICD-10-CM

## 2018-09-06 DIAGNOSIS — N17.9 ACUTE RENAL FAILURE SUPERIMPOSED ON CHRONIC KIDNEY DISEASE, UNSPECIFIED CKD STAGE, UNSPECIFIED ACUTE RENAL FAILURE TYPE (HCC): ICD-10-CM

## 2018-09-06 DIAGNOSIS — N18.9 ACUTE RENAL FAILURE SUPERIMPOSED ON CHRONIC KIDNEY DISEASE, UNSPECIFIED CKD STAGE, UNSPECIFIED ACUTE RENAL FAILURE TYPE (HCC): ICD-10-CM

## 2018-09-06 DIAGNOSIS — R53.1 GENERAL WEAKNESS: Primary | ICD-10-CM

## 2018-09-06 LAB
ALBUMIN SERPL-MCNC: 3.5 G/DL (ref 3.5–4.8)
ALBUMIN/GLOB SERPL: 0.9 {RATIO} (ref 1–2)
ALP LIVER SERPL-CCNC: 127 U/L (ref 45–117)
ALT SERPL-CCNC: 22 U/L (ref 17–63)
ANION GAP SERPL CALC-SCNC: 9 MMOL/L (ref 0–18)
AST SERPL-CCNC: 22 U/L (ref 15–41)
BASOPHILS # BLD AUTO: 0.07 X10(3) UL (ref 0–0.1)
BASOPHILS NFR BLD AUTO: 0.5 %
BILIRUB SERPL-MCNC: 1.5 MG/DL (ref 0.1–2)
BUN BLD-MCNC: 25 MG/DL (ref 8–20)
BUN/CREAT SERPL: 12.4 (ref 10–20)
CALCIUM BLD-MCNC: 9.1 MG/DL (ref 8.3–10.3)
CHLORIDE SERPL-SCNC: 107 MMOL/L (ref 101–111)
CK SERPL-CCNC: 233 IU/L (ref 39–308)
CO2 SERPL-SCNC: 24 MMOL/L (ref 22–32)
CREAT BLD-MCNC: 2.01 MG/DL (ref 0.7–1.3)
EOSINOPHIL # BLD AUTO: 0.16 X10(3) UL (ref 0–0.3)
EOSINOPHIL NFR BLD AUTO: 1.2 %
ERYTHROCYTE [DISTWIDTH] IN BLOOD BY AUTOMATED COUNT: 13.4 % (ref 11.5–16)
GLOBULIN PLAS-MCNC: 4.1 G/DL (ref 2.5–4)
GLUCOSE BLD-MCNC: 205 MG/DL (ref 65–99)
GLUCOSE BLD-MCNC: 274 MG/DL (ref 70–99)
HCT VFR BLD AUTO: 44.3 % (ref 37–53)
HGB BLD-MCNC: 14.4 G/DL (ref 13–17)
IMMATURE GRANULOCYTE COUNT: 0.1 X10(3) UL (ref 0–1)
IMMATURE GRANULOCYTE RATIO %: 0.8 %
INR BLD: 5.04 (ref 0.9–1.1)
INR: 8.6
LYMPHOCYTES # BLD AUTO: 1.63 X10(3) UL (ref 0.9–4)
LYMPHOCYTES NFR BLD AUTO: 12.5 %
M PROTEIN MFR SERPL ELPH: 7.6 G/DL (ref 6.1–8.3)
MCH RBC QN AUTO: 31.5 PG (ref 27–33.2)
MCHC RBC AUTO-ENTMCNC: 32.5 G/DL (ref 31–37)
MCV RBC AUTO: 96.9 FL (ref 80–99)
MONOCYTES # BLD AUTO: 0.72 X10(3) UL (ref 0.1–1)
MONOCYTES NFR BLD AUTO: 5.5 %
NEUTROPHIL ABS PRELIM: 10.35 X10 (3) UL (ref 1.3–6.7)
NEUTROPHILS # BLD AUTO: 10.35 X10(3) UL (ref 1.3–6.7)
NEUTROPHILS NFR BLD AUTO: 79.5 %
OSMOLALITY SERPL CALC.SUM OF ELEC: 304 MOSM/KG (ref 275–295)
PLATELET # BLD AUTO: 225 10(3)UL (ref 150–450)
POTASSIUM SERPL-SCNC: 3.5 MMOL/L (ref 3.6–5.1)
PSA SERPL DL<=0.01 NG/ML-MCNC: 48.1 SECONDS (ref 12.4–14.7)
RBC # BLD AUTO: 4.57 X10(6)UL (ref 3.8–5.8)
RED CELL DISTRIBUTION WIDTH-SD: 47.7 FL (ref 35.1–46.3)
SODIUM SERPL-SCNC: 140 MMOL/L (ref 136–144)
WBC # BLD AUTO: 13 X10(3) UL (ref 4–13)

## 2018-09-06 PROCEDURE — 71045 X-RAY EXAM CHEST 1 VIEW: CPT | Performed by: EMERGENCY MEDICINE

## 2018-09-06 PROCEDURE — 70450 CT HEAD/BRAIN W/O DYE: CPT | Performed by: EMERGENCY MEDICINE

## 2018-09-06 PROCEDURE — 99223 1ST HOSP IP/OBS HIGH 75: CPT | Performed by: HOSPITALIST

## 2018-09-06 RX ORDER — POTASSIUM CHLORIDE 20 MEQ/1
40 TABLET, EXTENDED RELEASE ORAL ONCE
Status: COMPLETED | OUTPATIENT
Start: 2018-09-06 | End: 2018-09-06

## 2018-09-06 RX ORDER — DILTIAZEM HYDROCHLORIDE 180 MG/1
180 CAPSULE, EXTENDED RELEASE ORAL DAILY
Status: DISCONTINUED | OUTPATIENT
Start: 2018-09-07 | End: 2018-09-08

## 2018-09-06 RX ORDER — ATORVASTATIN CALCIUM 20 MG/1
20 TABLET, FILM COATED ORAL NIGHTLY
Status: DISCONTINUED | OUTPATIENT
Start: 2018-09-06 | End: 2018-09-08

## 2018-09-06 RX ORDER — SODIUM CHLORIDE 9 MG/ML
INJECTION, SOLUTION INTRAVENOUS CONTINUOUS
Status: DISCONTINUED | OUTPATIENT
Start: 2018-09-06 | End: 2018-09-07

## 2018-09-06 RX ORDER — DEXTROSE MONOHYDRATE 25 G/50ML
50 INJECTION, SOLUTION INTRAVENOUS
Status: DISCONTINUED | OUTPATIENT
Start: 2018-09-06 | End: 2018-09-08

## 2018-09-06 RX ORDER — ASPIRIN 81 MG/1
81 TABLET ORAL DAILY
Status: DISCONTINUED | OUTPATIENT
Start: 2018-09-07 | End: 2018-09-08

## 2018-09-06 RX ORDER — WARFARIN SODIUM 2 MG/1
2 TABLET ORAL EVERY OTHER DAY
COMMUNITY
End: 2018-10-30

## 2018-09-06 RX ORDER — AMIODARONE HYDROCHLORIDE 100 MG/1
100 TABLET ORAL DAILY
Status: DISCONTINUED | OUTPATIENT
Start: 2018-09-07 | End: 2018-09-08

## 2018-09-06 RX ORDER — LEVOTHYROXINE SODIUM 88 UG/1
88 TABLET ORAL
Status: DISCONTINUED | OUTPATIENT
Start: 2018-09-07 | End: 2018-09-08

## 2018-09-06 RX ORDER — INSULIN LISPRO 100 [IU]/ML
INJECTION, SOLUTION INTRAVENOUS; SUBCUTANEOUS
COMMUNITY
End: 2020-01-17

## 2018-09-06 RX ORDER — WARFARIN SODIUM 4 MG/1
4 TABLET ORAL EVERY OTHER DAY
COMMUNITY
End: 2018-10-30

## 2018-09-06 RX ORDER — METOPROLOL SUCCINATE 25 MG/1
25 TABLET, EXTENDED RELEASE ORAL
Status: DISCONTINUED | OUTPATIENT
Start: 2018-09-07 | End: 2018-09-08

## 2018-09-06 RX ORDER — ATORVASTATIN CALCIUM 20 MG/1
20 TABLET, FILM COATED ORAL NIGHTLY
COMMUNITY
End: 2018-10-30

## 2018-09-06 NOTE — ED INITIAL ASSESSMENT (HPI)
Pt with decreased appetite over the past 2 weeks, elevated INR this morning. Wife states patient fell yesterday.

## 2018-09-07 ENCOUNTER — APPOINTMENT (OUTPATIENT)
Dept: GENERAL RADIOLOGY | Facility: HOSPITAL | Age: 73
DRG: 683 | End: 2018-09-07
Attending: HOSPITALIST
Payer: MEDICARE

## 2018-09-07 LAB
ANION GAP SERPL CALC-SCNC: 8 MMOL/L (ref 0–18)
BILIRUB UR QL STRIP.AUTO: NEGATIVE
BUN BLD-MCNC: 22 MG/DL (ref 8–20)
BUN/CREAT SERPL: 17.1 (ref 10–20)
CALCIUM BLD-MCNC: 8.2 MG/DL (ref 8.3–10.3)
CHLORIDE SERPL-SCNC: 110 MMOL/L (ref 101–111)
CO2 SERPL-SCNC: 24 MMOL/L (ref 22–32)
COLOR UR AUTO: YELLOW
CREAT BLD-MCNC: 1.29 MG/DL (ref 0.7–1.3)
ERYTHROCYTE [DISTWIDTH] IN BLOOD BY AUTOMATED COUNT: 13.2 % (ref 11.5–16)
EST. AVERAGE GLUCOSE BLD GHB EST-MCNC: 180 MG/DL (ref 68–126)
GLUCOSE BLD-MCNC: 180 MG/DL (ref 65–99)
GLUCOSE BLD-MCNC: 184 MG/DL (ref 70–99)
GLUCOSE BLD-MCNC: 196 MG/DL (ref 65–99)
GLUCOSE BLD-MCNC: 198 MG/DL (ref 65–99)
GLUCOSE BLD-MCNC: 307 MG/DL (ref 65–99)
GLUCOSE UR STRIP.AUTO-MCNC: >=500 MG/DL
HBA1C MFR BLD HPLC: 7.9 % (ref ?–5.7)
HCT VFR BLD AUTO: 39.8 % (ref 37–53)
HGB BLD-MCNC: 12.8 G/DL (ref 13–17)
HYALINE CASTS #/AREA URNS AUTO: PRESENT /LPF
INR BLD: 6.18 (ref 0.9–1.1)
LEUKOCYTE ESTERASE UR QL STRIP.AUTO: NEGATIVE
MCH RBC QN AUTO: 31.1 PG (ref 27–33.2)
MCHC RBC AUTO-ENTMCNC: 32.2 G/DL (ref 31–37)
MCV RBC AUTO: 96.8 FL (ref 80–99)
NITRITE UR QL STRIP.AUTO: NEGATIVE
OSMOLALITY SERPL CALC.SUM OF ELEC: 302 MOSM/KG (ref 275–295)
PH UR STRIP.AUTO: 5 [PH] (ref 4.5–8)
PLATELET # BLD AUTO: 192 10(3)UL (ref 150–450)
POTASSIUM SERPL-SCNC: 3.8 MMOL/L (ref 3.6–5.1)
POTASSIUM SERPL-SCNC: 3.8 MMOL/L (ref 3.6–5.1)
PROT UR STRIP.AUTO-MCNC: 30 MG/DL
PSA SERPL DL<=0.01 NG/ML-MCNC: 56.4 SECONDS (ref 12.4–14.7)
RBC # BLD AUTO: 4.11 X10(6)UL (ref 3.8–5.8)
RBC #/AREA URNS AUTO: >10 /HPF
RED CELL DISTRIBUTION WIDTH-SD: 47.5 FL (ref 35.1–46.3)
SODIUM SERPL-SCNC: 142 MMOL/L (ref 136–144)
SP GR UR STRIP.AUTO: 1.03 (ref 1–1.03)
UROBILINOGEN UR STRIP.AUTO-MCNC: <2 MG/DL
WBC # BLD AUTO: 10 X10(3) UL (ref 4–13)

## 2018-09-07 PROCEDURE — 72110 X-RAY EXAM L-2 SPINE 4/>VWS: CPT | Performed by: HOSPITALIST

## 2018-09-07 PROCEDURE — 99232 SBSQ HOSP IP/OBS MODERATE 35: CPT | Performed by: HOSPITALIST

## 2018-09-07 RX ORDER — POTASSIUM CHLORIDE 20 MEQ/1
40 TABLET, EXTENDED RELEASE ORAL ONCE
Status: COMPLETED | OUTPATIENT
Start: 2018-09-07 | End: 2018-09-07

## 2018-09-07 RX ORDER — ONDANSETRON 2 MG/ML
4 INJECTION INTRAMUSCULAR; INTRAVENOUS EVERY 4 HOURS PRN
Status: DISCONTINUED | OUTPATIENT
Start: 2018-09-07 | End: 2018-09-08

## 2018-09-07 RX ORDER — LISINOPRIL 20 MG/1
20 TABLET ORAL DAILY
Status: DISCONTINUED | OUTPATIENT
Start: 2018-09-07 | End: 2018-09-08

## 2018-09-07 RX ORDER — ACETAMINOPHEN 325 MG/1
650 TABLET ORAL EVERY 4 HOURS PRN
Status: DISCONTINUED | OUTPATIENT
Start: 2018-09-07 | End: 2018-09-08

## 2018-09-07 RX ORDER — HYDROCODONE BITARTRATE AND ACETAMINOPHEN 5; 325 MG/1; MG/1
2 TABLET ORAL EVERY 4 HOURS PRN
Status: DISCONTINUED | OUTPATIENT
Start: 2018-09-07 | End: 2018-09-08

## 2018-09-07 RX ORDER — HYDROCODONE BITARTRATE AND ACETAMINOPHEN 5; 325 MG/1; MG/1
1 TABLET ORAL EVERY 4 HOURS PRN
Status: DISCONTINUED | OUTPATIENT
Start: 2018-09-07 | End: 2018-09-08

## 2018-09-07 NOTE — H&P
[facility] HOSPITAL[facility]  History and Physical     Imelda Brunner Patient Status:  Emergency    3/5/1945 MRN [id number]   Location [facility] Attending Rommel Dukes MD   Hosp Day # 0 PCP Forest Bernabe DO     Chief Complaint: s/p 3/8/2008   • Stroke [facility])    • Type II or unspecified type diabetes mellitus with renal manifestations, uncontrolled(250.42) 2/7/2008   • Type II or unspecified type diabetes mellitus without mention of complication, not stated as uncontrolled 9/10/2010 mouth before breakfast.   Disp:  Rfl:    Fosinopril Sodium 20 MG Oral Tab Take 1 tablet (20 mg total) by mouth once daily. Disp: 90 tablet Rfl: 3   Amiodarone HCl 100 MG Oral Tab Take 1 tablet (100 mg total) by mouth daily.  Disp: 90 tablet Rfl: 3   aspirin 233       Imaging: Imaging data reviewed in Epic.       ASSESSMENT / PLAN:     3 74years old man with multiple medical problems presents with generalized weakness fatigue dehydration and possible worsening depression  -Continue IV fluids and monitor labs

## 2018-09-07 NOTE — CM/SW NOTE
09/07/18 1100   CM/SW Referral Data   Referral Source Social Work (self-referral)   Reason for Referral Discharge planning   Informant Patient   Patient Info   Patient's Mental Status Alert;Oriented   Patient's [address]   Number of Levels

## 2018-09-07 NOTE — PLAN OF CARE
HEMATOLOGIC - ADULT    • Maintains hematologic stability Progressing    • Free from bleeding injury Progressing        Impaired Functional Mobility    • Achieve highest/safest level of mobility/gait Progressing        PAIN - ADULT    • Verbalizes/displays

## 2018-09-07 NOTE — PAYOR COMM NOTE
--------------  ADMISSION REVIEW         9/6    ED LATE  Abnormal Result     Stated Complaint: inr elev     HPI     17-year-old male brought in by his wife for generalized fatigue, poor appetite over the past week.     They also checked his INR this morning Abnormal; Notable for the following:      PT 48.1 (*)       INR 5.04 (*)       All other components within normal limits   CBC W/ DIFFERENTIAL - Abnormal; Notable for the following:      RDW-SD 47.7 (*)       Neutrophil Absolute Prelim 10.35 (*)       Neut superimposed on chronic kidney disease,   COAGULOPATHY INR 5    IVF 83

## 2018-09-07 NOTE — ED PROVIDER NOTES
Patient Seen in: [facility] Emergency Department    History   Patient presents with:  Abnormal Result (metabolic, cardiac)    Stated Complaint: inr elev    HPI    78-year-old male brought in by his wife for generalized fatigue, poor appetite over th • Type II or unspecified type diabetes mellitus with renal manifestations, uncontrolled(250.42) 2/7/2008   • Type II or unspecified type diabetes mellitus without mention of complication, not stated as uncontrolled 9/10/2010   • Unspecified cerebral hyun good air exchange and clear   Heart: regular rate rhythm and no murmur   Abdomen: Soft and nontender. No abdominal masses. No peritoneal signs   : Mild scrotal edema. No discoloration. No testicular pain. No palpable masses or hernias.   Extremities: Date: 9/6/2018  PROCEDURE:  XR CHEST AP/PA (1 VIEW) (CPT=71045)  TECHNIQUE:  AP chest radiograph was obtained. COMPARISON:  [facility] , XR CHEST AP PORTABLE  (CPT=71010), 7/15/2015, 15:56.   INDICATIONS:  inr elev  PATIENT STATED HISTORY: (As transcribed by T MASTOIDS:          No sign of acute inflammation. SKULL:             No evidence for fracture or osseous abnormality. OTHER:             None. CONCLUSION:  No acute intracranial bleed. Old infarct and atrophy. No acute process is identified.     Dict

## 2018-09-07 NOTE — PLAN OF CARE
NURSING ADMISSION NOTE      Patient admitted via cart. Oriented to room. Safety precautions initiated. Bed in low position. Call light in reach. Admitted patient from ED due to elevated INR 5.04.  Per report, patient has weakness, poor appetite and

## 2018-09-08 VITALS
WEIGHT: 315 LBS | OXYGEN SATURATION: 96 % | SYSTOLIC BLOOD PRESSURE: 129 MMHG | HEART RATE: 59 BPM | DIASTOLIC BLOOD PRESSURE: 81 MMHG | BODY MASS INDEX: 41.75 KG/M2 | HEIGHT: 73 IN | RESPIRATION RATE: 18 BRPM | TEMPERATURE: 98 F

## 2018-09-08 LAB
GLUCOSE BLD-MCNC: 120 MG/DL (ref 65–99)
GLUCOSE BLD-MCNC: 192 MG/DL (ref 65–99)
INR BLD: 4.59 (ref 0.9–1.1)
INR BLD: 5.61 (ref 0.9–1.1)
POTASSIUM SERPL-SCNC: 3.7 MMOL/L (ref 3.6–5.1)
PSA SERPL DL<=0.01 NG/ML-MCNC: 44.7 SECONDS (ref 12.4–14.7)
PSA SERPL DL<=0.01 NG/ML-MCNC: 52.3 SECONDS (ref 12.4–14.7)

## 2018-09-08 PROCEDURE — 99239 HOSP IP/OBS DSCHRG MGMT >30: CPT | Performed by: HOSPITALIST

## 2018-09-08 RX ORDER — POTASSIUM CHLORIDE 20 MEQ/1
40 TABLET, EXTENDED RELEASE ORAL ONCE
Status: COMPLETED | OUTPATIENT
Start: 2018-09-08 | End: 2018-09-08

## 2018-09-08 NOTE — PROGRESS NOTES
[facility]  Progress Note     Rosemary Trevino Patient Status:  Inpatient    3/5/1945 MRN [id number]   [facility] 4NW-A Attending Suyapa Roche MD   [facility] Day # 2 PCP Juan Rodriguez DO     Chief Complaint: fall    S: Patient feels ok Daily   • atorvastatin  20 mg Oral Nightly   • DilTIAZem HCl ER Coated Beads  180 mg Oral Daily   • insulin detemir  30 Units Subcutaneous Nightly   • Levothyroxine Sodium  88 mcg Oral Before breakfast   • Metoprolol Succinate ER  25 mg Oral Daily Beta Blo

## 2018-09-08 NOTE — PHYSICAL THERAPY NOTE
Writer contacted by RN re: PT eval this date, chart reviewed. PT with elevated INR >5.  Will hold per dept policy until INR <5 as pt is high fall risk. Will f/u later during admit to evaluate as appropriate.     Addendum: 14:04:  Writer contacted by RN re

## 2018-09-10 ENCOUNTER — TELEPHONE (OUTPATIENT)
Dept: FAMILY MEDICINE CLINIC | Facility: CLINIC | Age: 73
End: 2018-09-10

## 2018-09-10 LAB — INR: 0

## 2018-09-10 RX ORDER — MECLIZINE HYDROCHLORIDE 25 MG/1
25 TABLET ORAL 3 TIMES DAILY PRN
Qty: 10 TABLET | Refills: 0 | Status: SHIPPED | OUTPATIENT
Start: 2018-09-10 | End: 2019-04-02

## 2018-09-10 NOTE — PAYOR COMM NOTE
--------------  CONTINUED STAY REVIEW    [facility] MEDICARE ADV PPO  Subscriber #:  [id number]  Authorization Number: [id number]    Admit date: 9/6/18  Admit time: 2146    Admitting Physician: Yonathan Haile MD  Attending Physician:  No att. awais steel

## 2018-09-10 NOTE — DISCHARGE SUMMARY
[facility] [place] HOSPITAL[facility]  DISCHARGE SUMMARY     Nevin Euceda Patient Status:  Inpatient    3/5/1945 MRN [id number]   [facility] 4NW-A Attending No att. providers found   Hosp Day # 2 PCP Boaz Sesay DO     Date of Admission: 2018  Date o as he does not like going to the bathroom due to being mainly in a wheelchair and getting help from family members. He has chronic right-sided weakness from her previous stroke.         Brief Synopsis:     Patient was diagnosed with mechanical fall seconda LEVOTHROID      Take 88 mcg by mouth before breakfast.   Refills:  0     Metoprolol Succinate ER 25 MG Tb24  Commonly known as:  TOPROL XL      Take 1 tablet (25 mg total) by mouth daily.    Quantity:  90 tablet  Refills:  3     Vitamin D-3 5000 units Tabs

## 2018-09-10 NOTE — TELEPHONE ENCOUNTER
Pt's daughter called stated they tried otc and it is not working. They would like him to take Antivert.

## 2018-09-10 NOTE — TELEPHONE ENCOUNTER
Pt's daughter called stated her father was just released from [facility] for a fall. She stated he is dizzy, headache and his stomach hurts and ribs hurt. She stated his back hurts.  His daughter stated they did xrays in the hospital. She was wondering if

## 2018-09-10 NOTE — TELEPHONE ENCOUNTER
Patient had a fall wedensday  Patient admitted to the hospital post fall on Thursday. Patient was discharged from the hospital Saturday afternoon  Patient feeling dizzy  Patient feeling nauseous  No vomiting, but gagging.   At times seeing double  Patient

## 2018-09-11 ENCOUNTER — TELEPHONE (OUTPATIENT)
Dept: FAMILY MEDICINE CLINIC | Facility: CLINIC | Age: 73
End: 2018-09-11

## 2018-09-11 ENCOUNTER — PATIENT OUTREACH (OUTPATIENT)
Dept: CASE MANAGEMENT | Age: 73
End: 2018-09-11

## 2018-09-11 ENCOUNTER — PRIOR ORIGINAL RECORDS (OUTPATIENT)
Dept: OTHER | Age: 73
End: 2018-09-11

## 2018-09-11 DIAGNOSIS — E86.0 DEHYDRATION: ICD-10-CM
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
## 2018-09-11 LAB — INR: 2.8

## 2018-09-11 NOTE — TELEPHONE ENCOUNTER
Patient discharged from [facility] on 9/8/18 and recommended to have a TCM HFU by 9/22/18. NCM attempted to schedule TCM HFU patient's wife states she will call to schedule.     Triage: Please notify Dr. Gonzales Mcfadden of the above and then call patient's wif

## 2018-09-11 NOTE — PROGRESS NOTES
Initial Post Discharge Follow Up   Discharge Date: 9/8/18  Contact Date: 9/11/2018    Consent Verification:  Assessment Completed With: Spouse: Corky Melendez received per patient?  written  HIPAA Verified?   Yes    Discharge Dx:    Mechanical fa nightly. Disp:  Rfl:    insulin glargine 100 UNIT/ML Subcutaneous Solution Inject 40 Units into the skin nightly. Disp:  Rfl:    Insulin Lispro 100 UNIT/ML Subcutaneous Solution Inject into the skin 3 (three) times daily before meals.  Per SS Disp:  Rfl: D/C?No    Services ordered at D/C?   No         Needs post D/C:   Now that you are home, are there any needs or concerns you need addressed before your next visit with your PCP?  (DME, meds, disease concerns, Etc): No     Follow up appointments:       Your

## 2018-09-11 NOTE — PAYOR COMM NOTE
--------------  DISCHARGE REVIEW    Payor: [employer] MEDICARE ADV PPO  Subscriber #:  [id number]  Authorization Number: [id number]    Admit date: 9/6/18  Admit time:  2146  Discharge Date: 9/8/2018  5:38 PM     Admitting Physician: Josué Madden MD  Attending Ph

## 2018-09-14 ENCOUNTER — PRIOR ORIGINAL RECORDS (OUTPATIENT)
Dept: OTHER | Age: 73
End: 2018-09-14

## 2018-09-14 LAB — INR: 1.7

## 2018-09-17 NOTE — TELEPHONE ENCOUNTER
LOV: 5/15/18  General medical exam  A1C: 7.9 9/6/18    Insulin Pen Needle (BD PEN NEEDLE ULTRAFINE) 29G X 12.7MM Does not apply Misc  400 each 0 6/1/2016 9/6/2018   Sig :  To use 4 times daily. Please schedule an appointment for further refills.      Route:

## 2018-09-18 ENCOUNTER — PRIOR ORIGINAL RECORDS (OUTPATIENT)
Dept: OTHER | Age: 73
End: 2018-09-18

## 2018-09-18 LAB — INR: 2.3

## 2018-09-25 ENCOUNTER — PRIOR ORIGINAL RECORDS (OUTPATIENT)
Dept: OTHER | Age: 73
End: 2018-09-25

## 2018-09-25 ENCOUNTER — OFFICE VISIT (OUTPATIENT)
Dept: FAMILY MEDICINE CLINIC | Facility: CLINIC | Age: 73
End: 2018-09-25
Payer: MEDICARE

## 2018-09-25 VITALS
HEART RATE: 96 BPM | TEMPERATURE: 97 F | OXYGEN SATURATION: 99 % | SYSTOLIC BLOOD PRESSURE: 134 MMHG | DIASTOLIC BLOOD PRESSURE: 74 MMHG | RESPIRATION RATE: 20 BRPM

## 2018-09-25 DIAGNOSIS — F51.01 PRIMARY INSOMNIA: ICD-10-CM

## 2018-09-25 DIAGNOSIS — E78.2 MIXED HYPERLIPIDEMIA: ICD-10-CM

## 2018-09-25 DIAGNOSIS — E11.22 DIABETES MELLITUS WITH STAGE 3 CHRONIC KIDNEY DISEASE (HCC): Primary | ICD-10-CM

## 2018-09-25 DIAGNOSIS — N18.30 DIABETES MELLITUS WITH STAGE 3 CHRONIC KIDNEY DISEASE (HCC): Primary | ICD-10-CM

## 2018-09-25 DIAGNOSIS — I10 ESSENTIAL HYPERTENSION WITH GOAL BLOOD PRESSURE LESS THAN 140/90: ICD-10-CM

## 2018-09-25 LAB
ALBUMIN SERPL-MCNC: 3.6 G/DL (ref 3.5–4.8)
ALBUMIN/GLOB SERPL: 0.8 {RATIO} (ref 1–2)
ALP LIVER SERPL-CCNC: 162 U/L (ref 45–117)
ALT SERPL-CCNC: 25 U/L (ref 17–63)
ANION GAP SERPL CALC-SCNC: 9 MMOL/L (ref 0–18)
AST SERPL-CCNC: 19 U/L (ref 15–41)
BILIRUB SERPL-MCNC: 1.2 MG/DL (ref 0.1–2)
BUN BLD-MCNC: 26 MG/DL (ref 8–20)
BUN/CREAT SERPL: 18.3 (ref 10–20)
CALCIUM BLD-MCNC: 9 MG/DL (ref 8.3–10.3)
CHLORIDE SERPL-SCNC: 108 MMOL/L (ref 101–111)
CHOLEST SMN-MCNC: 134 MG/DL (ref ?–200)
CK SERPL-CCNC: 125 IU/L (ref 39–308)
CO2 SERPL-SCNC: 24 MMOL/L (ref 22–32)
CREAT BLD-MCNC: 1.42 MG/DL (ref 0.7–1.3)
EST. AVERAGE GLUCOSE BLD GHB EST-MCNC: 183 MG/DL (ref 68–126)
GLOBULIN PLAS-MCNC: 4.3 G/DL (ref 2.5–4)
GLUCOSE BLD-MCNC: 85 MG/DL (ref 70–99)
HBA1C MFR BLD HPLC: 8 % (ref ?–5.7)
HDLC SERPL-MCNC: 60 MG/DL (ref 40–59)
LDLC SERPL CALC-MCNC: 61 MG/DL (ref ?–100)
M PROTEIN MFR SERPL ELPH: 7.9 G/DL (ref 6.1–8.3)
NONHDLC SERPL-MCNC: 74 MG/DL (ref ?–130)
OSMOLALITY SERPL CALC.SUM OF ELEC: 296 MOSM/KG (ref 275–295)
POTASSIUM SERPL-SCNC: 3.7 MMOL/L (ref 3.6–5.1)
SODIUM SERPL-SCNC: 141 MMOL/L (ref 136–144)
TRIGL SERPL-MCNC: 64 MG/DL (ref 30–149)
VLDLC SERPL CALC-MCNC: 13 MG/DL (ref 0–30)

## 2018-09-25 PROCEDURE — 83036 HEMOGLOBIN GLYCOSYLATED A1C: CPT | Performed by: FAMILY MEDICINE

## 2018-09-25 PROCEDURE — 36415 COLL VENOUS BLD VENIPUNCTURE: CPT | Performed by: FAMILY MEDICINE

## 2018-09-25 PROCEDURE — 82550 ASSAY OF CK (CPK): CPT | Performed by: FAMILY MEDICINE

## 2018-09-25 PROCEDURE — G0008 ADMIN INFLUENZA VIRUS VAC: HCPCS | Performed by: FAMILY MEDICINE

## 2018-09-25 PROCEDURE — 80061 LIPID PANEL: CPT | Performed by: FAMILY MEDICINE

## 2018-09-25 PROCEDURE — 90653 IIV ADJUVANT VACCINE IM: CPT | Performed by: FAMILY MEDICINE

## 2018-09-25 PROCEDURE — 80053 COMPREHEN METABOLIC PANEL: CPT | Performed by: FAMILY MEDICINE

## 2018-09-25 PROCEDURE — 99214 OFFICE O/P EST MOD 30 MIN: CPT | Performed by: FAMILY MEDICINE

## 2018-09-29 NOTE — PROGRESS NOTES
CC: Multiple issues    DM: chronic, stable. due for labs check. Compliant with meds. Diet has been better. HTN: chronic, stable. Compliant with meds. Hyperlipidemia: chronic, stable. Compliant with meds. No side effects.      Insomnia:   Quality di Oral Tab Take 1 tablet (25 mg total) by mouth 3 (three) times daily as needed for Dizziness. Disp: 10 tablet Rfl: 0   atorvastatin 20 MG Oral Tab Take 20 mg by mouth nightly. Disp:  Rfl:    Warfarin Sodium 4 MG Oral Tab Take 4 mg by mouth every other day. legs/feet is normal.  L: CTA leobardo    A/P:   Diabetes mellitus with stage 3 chronic kidney disease (hcc)  (primary encounter diagnosis)  Essential hypertension with goal blood pressure less than 140/90  Mixed hyperlipidemia  Primary insomnia  1.  Diabetes daniella

## 2018-10-02 ENCOUNTER — PRIOR ORIGINAL RECORDS (OUTPATIENT)
Dept: OTHER | Age: 73
End: 2018-10-02

## 2018-10-02 LAB — INR: 1.7

## 2018-10-08 ENCOUNTER — PRIOR ORIGINAL RECORDS (OUTPATIENT)
Dept: OTHER | Age: 73
End: 2018-10-08

## 2018-10-08 ENCOUNTER — MYAURORA ACCOUNT LINK (OUTPATIENT)
Dept: OTHER | Age: 73
End: 2018-10-08

## 2018-10-11 ENCOUNTER — PRIOR ORIGINAL RECORDS (OUTPATIENT)
Dept: OTHER | Age: 73
End: 2018-10-11

## 2018-10-11 LAB
ALBUMIN: 3.6 G/DL
ALKALINE PHOSPHATATE(ALK PHOS): 162 IU/L
BILIRUBIN TOTAL: 1.2 MG/DL
BUN: 26 MG/DL
CALCIUM: 9 MG/DL
CHLORIDE: 108 MEQ/L
CHOLESTEROL, TOTAL: 134 MG/DL
CREATININE, SERUM: 1.42 MG/DL
GLOBULIN: 4.3 G/DL
GLUCOSE: 85 MG/DL
HDL CHOLESTEROL: 60 MG/DL
HEMOGLOBIN A1C: 8 %
LDL CHOLESTEROL: 61 MG/DL
POTASSIUM, SERUM: 3.7 MEQ/L
PROTEIN, TOTAL: 7.9 G/DL
SGOT (AST): 19 IU/L
SGPT (ALT): 25 IU/L
SODIUM: 141 MEQ/L
TRIGLYCERIDES: 64 MG/DL

## 2018-10-12 ENCOUNTER — PRIOR ORIGINAL RECORDS (OUTPATIENT)
Dept: OTHER | Age: 73
End: 2018-10-12

## 2018-10-16 ENCOUNTER — PRIOR ORIGINAL RECORDS (OUTPATIENT)
Dept: OTHER | Age: 73
End: 2018-10-16

## 2018-10-30 ENCOUNTER — NURSE ONLY (OUTPATIENT)
Dept: FAMILY MEDICINE CLINIC | Facility: CLINIC | Age: 73
End: 2018-10-30
Payer: MEDICARE

## 2018-10-30 DIAGNOSIS — E11.22 TYPE 2 DIABETES MELLITUS WITH ESRD (END-STAGE RENAL DISEASE) (HCC): Primary | Chronic | ICD-10-CM

## 2018-10-30 DIAGNOSIS — N18.6 TYPE 2 DIABETES MELLITUS WITH ESRD (END-STAGE RENAL DISEASE) (HCC): Primary | Chronic | ICD-10-CM

## 2018-10-30 PROCEDURE — 36415 COLL VENOUS BLD VENIPUNCTURE: CPT | Performed by: FAMILY MEDICINE

## 2018-10-30 PROCEDURE — 83036 HEMOGLOBIN GLYCOSYLATED A1C: CPT | Performed by: FAMILY MEDICINE

## 2018-10-30 RX ORDER — ATORVASTATIN CALCIUM 20 MG/1
20 TABLET, FILM COATED ORAL NIGHTLY
Qty: 90 TABLET | Refills: 0 | Status: SHIPPED | OUTPATIENT
Start: 2018-10-30 | End: 2019-02-14

## 2018-10-30 RX ORDER — RIVAROXABAN 15 MG/1
15 TABLET, FILM COATED ORAL
COMMUNITY
Start: 2018-10-18 | End: 2019-07-30

## 2018-10-30 RX ORDER — LEVOTHYROXINE SODIUM 88 UG/1
88 TABLET ORAL
Qty: 90 TABLET | Refills: 0 | Status: SHIPPED | OUTPATIENT
Start: 2018-10-30 | End: 2019-01-29

## 2018-10-30 RX ORDER — ESCITALOPRAM OXALATE 20 MG/1
20 TABLET ORAL DAILY
COMMUNITY
End: 2019-04-02

## 2018-10-30 NOTE — TELEPHONE ENCOUNTER
LOV: 9/25/18 for diabetes    atorvastatin 20 MG Oral Tab       Sig :  Take 20 mg by mouth nightly.      Route:   Oral       Levothyroxine Sodium 88 MCG Oral Tab   4/14/2018    Sig :  Take 88 mcg by mouth before breakfast.       Route:   Oral       Future Ap

## 2018-10-31 ENCOUNTER — MED REC SCAN ONLY (OUTPATIENT)
Dept: FAMILY MEDICINE CLINIC | Facility: CLINIC | Age: 73
End: 2018-10-31

## 2019-01-08 DIAGNOSIS — N18.30 DIABETES MELLITUS WITH STAGE 3 CHRONIC KIDNEY DISEASE (HCC): ICD-10-CM

## 2019-01-08 DIAGNOSIS — E11.22 DIABETES MELLITUS WITH STAGE 3 CHRONIC KIDNEY DISEASE (HCC): ICD-10-CM

## 2019-01-08 DIAGNOSIS — I10 ESSENTIAL HYPERTENSION WITH GOAL BLOOD PRESSURE LESS THAN 140/90: ICD-10-CM

## 2019-01-08 DIAGNOSIS — E78.2 MIXED HYPERLIPIDEMIA: ICD-10-CM

## 2019-01-08 RX ORDER — INSULIN GLARGINE 100 [IU]/ML
INJECTION, SOLUTION SUBCUTANEOUS
Qty: 45 PEN | Refills: 1 | Status: SHIPPED | OUTPATIENT
Start: 2019-01-08 | End: 2019-05-10

## 2019-01-08 NOTE — TELEPHONE ENCOUNTER
LOV: 9/25/18 for diabetes  A1C: 10/30/18    insulin glargine 100 UNIT/ML Subcutaneous Solution       Sig :  Inject 40 Units into the skin nightly.      Route:   Subcutaneous       Future Appointments   Date Time Provider Mariluz Sapp   3/29/2019  1:00

## 2019-01-29 RX ORDER — LEVOTHYROXINE SODIUM 88 UG/1
88 TABLET ORAL
Qty: 90 TABLET | Refills: 0 | Status: SHIPPED | OUTPATIENT
Start: 2019-01-29 | End: 2019-05-09

## 2019-01-29 NOTE — TELEPHONE ENCOUNTER
LOV: 9/25/18 for diabetes mellitus  TSH: 7/12/18    Levothyroxine Sodium 88 MCG Oral Tab 90 tablet 0 10/30/2018    Sig :  Take 1 tablet (88 mcg total) by mouth before breakfast.     Route:   Oral       Future Appointments   Date Time Provider Department Ce

## 2019-02-14 NOTE — TELEPHONE ENCOUNTER
Pt needs a refill on Atrovastatin 20 mg 90 day supply with refills. Would like sent to [facility].

## 2019-02-14 NOTE — TELEPHONE ENCOUNTER
LOV: 9/25/18 for diabetes  Lipid: 9/25/18    atorvastatin 20 MG Oral Tab 90 tablet 0 10/30/2018    Sig :  Take 1 tablet (20 mg total) by mouth nightly.      Route:   Oral       Future Appointments   Date Time Provider Mariluz Sapp   3/29/2019  1:00 PM

## 2019-02-15 RX ORDER — ATORVASTATIN CALCIUM 20 MG/1
20 TABLET, FILM COATED ORAL NIGHTLY
Qty: 90 TABLET | Refills: 1 | Status: SHIPPED | OUTPATIENT
Start: 2019-02-15 | End: 2019-07-30

## 2019-02-28 VITALS
HEIGHT: 76 IN | HEART RATE: 78 BPM | WEIGHT: 310 LBS | SYSTOLIC BLOOD PRESSURE: 126 MMHG | DIASTOLIC BLOOD PRESSURE: 72 MMHG | BODY MASS INDEX: 37.75 KG/M2

## 2019-02-28 VITALS — SYSTOLIC BLOOD PRESSURE: 112 MMHG | DIASTOLIC BLOOD PRESSURE: 70 MMHG | HEART RATE: 94 BPM | HEIGHT: 76 IN

## 2019-02-28 VITALS — HEART RATE: 76 BPM | DIASTOLIC BLOOD PRESSURE: 70 MMHG | SYSTOLIC BLOOD PRESSURE: 132 MMHG

## 2019-03-01 VITALS — SYSTOLIC BLOOD PRESSURE: 102 MMHG | HEART RATE: 70 BPM | HEIGHT: 72 IN | DIASTOLIC BLOOD PRESSURE: 60 MMHG

## 2019-03-19 RX ORDER — CITALOPRAM 20 MG/1
TABLET ORAL
COMMUNITY
Start: 2013-05-08 | End: 2021-03-26

## 2019-03-19 RX ORDER — INSULIN LISPRO 100 [IU]/ML
INJECTION, SOLUTION INTRAVENOUS; SUBCUTANEOUS
COMMUNITY

## 2019-03-19 RX ORDER — WARFARIN SODIUM 4 MG/1
TABLET ORAL
COMMUNITY
Start: 2018-01-22 | End: 2019-04-08 | Stop reason: ALTCHOICE

## 2019-03-19 RX ORDER — ATORVASTATIN CALCIUM 20 MG/1
TABLET, FILM COATED ORAL
COMMUNITY
Start: 2017-05-15 | End: 2022-09-27

## 2019-03-19 RX ORDER — DILTIAZEM HYDROCHLORIDE 180 MG/1
180 CAPSULE, EXTENDED RELEASE ORAL DAILY
COMMUNITY
Start: 2013-05-08 | End: 2022-09-30 | Stop reason: SDUPTHER

## 2019-03-19 RX ORDER — WARFARIN SODIUM 4 MG/1
TABLET ORAL
COMMUNITY
Start: 2014-10-28 | End: 2019-04-08 | Stop reason: ALTCHOICE

## 2019-03-19 RX ORDER — INSULIN GLARGINE 100 [IU]/ML
20 INJECTION, SOLUTION SUBCUTANEOUS NIGHTLY
COMMUNITY

## 2019-03-19 RX ORDER — FOSINOPRIL SODIUM 20 MG/1
1 TABLET ORAL DAILY
COMMUNITY
Start: 2015-08-24 | End: 2023-02-03 | Stop reason: ALTCHOICE

## 2019-03-19 RX ORDER — LEVOTHYROXINE SODIUM 88 UG/1
TABLET ORAL
COMMUNITY
Start: 2018-08-10

## 2019-04-02 ENCOUNTER — OFFICE VISIT (OUTPATIENT)
Dept: FAMILY MEDICINE CLINIC | Facility: CLINIC | Age: 74
End: 2019-04-02
Payer: MEDICARE

## 2019-04-02 VITALS
HEART RATE: 102 BPM | DIASTOLIC BLOOD PRESSURE: 84 MMHG | SYSTOLIC BLOOD PRESSURE: 130 MMHG | RESPIRATION RATE: 16 BRPM | TEMPERATURE: 99 F | OXYGEN SATURATION: 98 %

## 2019-04-02 DIAGNOSIS — E66.01 SEVERE OBESITY (BMI 35.0-39.9) WITH COMORBIDITY (HCC): ICD-10-CM

## 2019-04-02 DIAGNOSIS — E11.59 HYPERTENSION ASSOCIATED WITH DIABETES (HCC): Chronic | ICD-10-CM

## 2019-04-02 DIAGNOSIS — E78.5 HYPERLIPIDEMIA ASSOCIATED WITH TYPE 2 DIABETES MELLITUS (HCC): ICD-10-CM

## 2019-04-02 DIAGNOSIS — G82.20 PARAPLEGIA (HCC): ICD-10-CM

## 2019-04-02 DIAGNOSIS — E11.69 HYPERLIPIDEMIA ASSOCIATED WITH TYPE 2 DIABETES MELLITUS (HCC): ICD-10-CM

## 2019-04-02 DIAGNOSIS — N18.30 STAGE 3 CHRONIC KIDNEY DISEASE (HCC): Chronic | ICD-10-CM

## 2019-04-02 DIAGNOSIS — E11.22 TYPE 2 DIABETES MELLITUS WITH ESRD (END-STAGE RENAL DISEASE) (HCC): Chronic | ICD-10-CM

## 2019-04-02 DIAGNOSIS — N18.6 TYPE 2 DIABETES MELLITUS WITH ESRD (END-STAGE RENAL DISEASE) (HCC): Chronic | ICD-10-CM

## 2019-04-02 DIAGNOSIS — I48.91 ATRIAL FIBRILLATION WITH RAPID VENTRICULAR RESPONSE (HCC): ICD-10-CM

## 2019-04-02 DIAGNOSIS — Z13.6 SCREENING FOR CARDIOVASCULAR CONDITION: ICD-10-CM

## 2019-04-02 DIAGNOSIS — Z00.00 ENCOUNTER FOR ANNUAL HEALTH EXAMINATION: Primary | ICD-10-CM

## 2019-04-02 DIAGNOSIS — I15.2 HYPERTENSION ASSOCIATED WITH DIABETES (HCC): Chronic | ICD-10-CM

## 2019-04-02 DIAGNOSIS — R97.20 ELEVATED PSA: ICD-10-CM

## 2019-04-02 DIAGNOSIS — Z13.1 SCREENING FOR DIABETES MELLITUS (DM): ICD-10-CM

## 2019-04-02 PROCEDURE — 96160 PT-FOCUSED HLTH RISK ASSMT: CPT | Performed by: FAMILY MEDICINE

## 2019-04-02 PROCEDURE — G0439 PPPS, SUBSEQ VISIT: HCPCS | Performed by: FAMILY MEDICINE

## 2019-04-02 PROCEDURE — 80053 COMPREHEN METABOLIC PANEL: CPT | Performed by: FAMILY MEDICINE

## 2019-04-02 PROCEDURE — 85025 COMPLETE CBC W/AUTO DIFF WBC: CPT | Performed by: FAMILY MEDICINE

## 2019-04-02 PROCEDURE — 99397 PER PM REEVAL EST PAT 65+ YR: CPT | Performed by: FAMILY MEDICINE

## 2019-04-02 PROCEDURE — 84443 ASSAY THYROID STIM HORMONE: CPT | Performed by: FAMILY MEDICINE

## 2019-04-02 PROCEDURE — 84439 ASSAY OF FREE THYROXINE: CPT | Performed by: FAMILY MEDICINE

## 2019-04-02 PROCEDURE — 36415 COLL VENOUS BLD VENIPUNCTURE: CPT | Performed by: FAMILY MEDICINE

## 2019-04-02 PROCEDURE — 80061 LIPID PANEL: CPT | Performed by: FAMILY MEDICINE

## 2019-04-02 PROCEDURE — 83036 HEMOGLOBIN GLYCOSYLATED A1C: CPT | Performed by: FAMILY MEDICINE

## 2019-04-02 RX ORDER — TEMAZEPAM 7.5 MG/1
7.5 CAPSULE ORAL NIGHTLY PRN
Qty: 30 CAPSULE | Refills: 0 | Status: SHIPPED | OUTPATIENT
Start: 2019-04-02 | End: 2019-04-02

## 2019-04-02 RX ORDER — ESCITALOPRAM OXALATE 20 MG/1
20 TABLET ORAL DAILY
Qty: 90 TABLET | Refills: 3 | Status: SHIPPED | OUTPATIENT
Start: 2019-04-02 | End: 2021-02-16

## 2019-04-02 RX ORDER — TEMAZEPAM 7.5 MG/1
7.5 CAPSULE ORAL NIGHTLY PRN
Qty: 30 CAPSULE | Refills: 0 | Status: SHIPPED | OUTPATIENT
Start: 2019-04-02 | End: 2019-09-30

## 2019-04-02 NOTE — PROGRESS NOTES
HPI:   Zeynep Rodriguez is a 66year old male who presents for a Medicare Subsequent Annual Wellness visit (Pt already had Initial Annual Wellness). Doing as well as he can. He has no complaints.    Annual Physical due on 05/15/2019        Fall/Risk Ass Walking problems based on screening of functional status. Difficulty walking?: Yes   He has problems with Daily Activities based on screening of functional status.    Problems with daily activities? : Yes   He has problems with Memory based on screening o date: 1982        Years since quittin.2      Smokeless tobacco: Never Used      Tobacco comment: NON-SMOKER       Mr. Eliezer Lopez already takes aspirin and has it on his medication list.   CAGE Alcohol screening   Karie Whitmore was screened for [facility] mouth nightly as needed for Sleep. atorvastatin 20 MG Oral Tab Take 1 tablet (20 mg total) by mouth nightly.    Levothyroxine Sodium 88 MCG Oral Tab Take 1 tablet (88 mcg total) by mouth before breakfast.   LANTUS SOLOSTAR 100 UNIT/ML Subcutaneous Solutio (postoperative nausea and vomiting), Proteinuria (3/8/2008), Stroke ([facility] [address] Type II or unspecified type diabetes mellitus with renal manifestations, uncontrolled(250.42) (2/7/2008), Type II or unspecified type diabetes mellitus without mention of complicati irregularly irregular rhythm     Vaccination History     Immunization History   Administered Date(s) Administered   • >=3 YRS TRI  MULTIDOSE VIAL (98897) FLU CLINIC 10/24/2014   • FLUAD High Dose 72 yr and older (41063) 09/05/2017, 09/25/2018   • HIGH DOSE Future  - LIPID PANEL  - VENIPUNCTURE    2. Encounter for annual health examination    - LIPID PANEL; Future  - COMP METABOLIC PANEL (14); Future  - HEMOGLOBIN A1C; Future  - CBC WITH DIFFERENTIAL WITH PLATELET;  Future  - TSH W REFLEX TO FREE T4; Future  - chart, separate sheet to patient  PREVENTATIVE SERVICES  INDICATIONS AND SCHEDULE Internal Lab or Procedure External Lab or Procedure   Diabetes Screening      HbgA1C   Annually HGBA1C (%)   Date Value   06/13/2014 7.9 (H)     HgbA1C (%)   Date Value   10/ the mentally retarded   Persons who live in the same house as a HepB virus carrier   Homosexual men   Illicit injectable drug abusers     Tetanus Toxoid  Only covered with a cut with metal- TD and TDaP Not covered by Medicare Part B) No vaccine history fou

## 2019-04-02 NOTE — PATIENT INSTRUCTIONS
Mendel Escobar's SCREENING SCHEDULE   Tests on this list are recommended by your physician but may not be covered, or covered at this frequency, by your insurer. Please check with your insurance carrier before scheduling to verify coverage.     ASHLIE meet one of the following criteria:   • Men who are 73-68 years old and have smoked more than 100 cigarettes in their lifetime   • Anyone with a family history    Colorectal Cancer Screening Covered up to Age 76     Colonoscopy Screen   Covered every 10 ye (Prevnar)  Covered Once after 65 Orders placed or performed in visit on 09/05/17   • PNEUMOCOCCAL VACC, 13 ALEX IM   Orders placed or performed in visit on 12/27/16   • PNEUMOCOCCAL VACC, 13 ALEX IM    Please get once after your 65th birthday    Pneumococcal

## 2019-04-04 ENCOUNTER — TELEPHONE (OUTPATIENT)
Dept: FAMILY MEDICINE CLINIC | Facility: CLINIC | Age: 74
End: 2019-04-04

## 2019-04-04 NOTE — TELEPHONE ENCOUNTER
Received a call from [address] lab regarding PSA that was added on. PSA cannot be added. Specimen is too old for test to be added.

## 2019-04-08 ENCOUNTER — OFFICE VISIT (OUTPATIENT)
Dept: CARDIOLOGY | Age: 74
End: 2019-04-08

## 2019-04-08 VITALS
HEART RATE: 88 BPM | WEIGHT: 286 LBS | HEIGHT: 76 IN | DIASTOLIC BLOOD PRESSURE: 80 MMHG | SYSTOLIC BLOOD PRESSURE: 130 MMHG | BODY MASS INDEX: 34.83 KG/M2

## 2019-04-08 DIAGNOSIS — I10 HYPERTENSION, BENIGN: Primary | ICD-10-CM

## 2019-04-08 DIAGNOSIS — I65.23 ASYMPTOMATIC CAROTID ARTERY STENOSIS, BILATERAL: ICD-10-CM

## 2019-04-08 DIAGNOSIS — I25.10 CORONARY ARTERY DISEASE INVOLVING NATIVE CORONARY ARTERY OF NATIVE HEART WITHOUT ANGINA PECTORIS: ICD-10-CM

## 2019-04-08 DIAGNOSIS — I48.19 PERSISTENT ATRIAL FIBRILLATION (CMD): ICD-10-CM

## 2019-04-08 DIAGNOSIS — R60.0 EDEMA EXTREMITIES: ICD-10-CM

## 2019-04-08 DIAGNOSIS — E78.00 PURE HYPERCHOLESTEROLEMIA: ICD-10-CM

## 2019-04-08 PROCEDURE — 99214 OFFICE O/P EST MOD 30 MIN: CPT | Performed by: INTERNAL MEDICINE

## 2019-04-08 PROCEDURE — 3075F SYST BP GE 130 - 139MM HG: CPT | Performed by: INTERNAL MEDICINE

## 2019-04-08 PROCEDURE — 3079F DIAST BP 80-89 MM HG: CPT | Performed by: INTERNAL MEDICINE

## 2019-04-12 ENCOUNTER — APPOINTMENT (OUTPATIENT)
Dept: LAB | Age: 74
End: 2019-04-12
Attending: FAMILY MEDICINE
Payer: MEDICARE

## 2019-04-12 DIAGNOSIS — Z12.5 PROSTATE CANCER SCREENING: ICD-10-CM

## 2019-04-17 ENCOUNTER — HOSPITAL ENCOUNTER (OUTPATIENT)
Dept: CARDIOLOGY CLINIC | Facility: HOSPITAL | Age: 74
Discharge: HOME | End: 2019-04-17
Attending: INTERNAL MEDICINE
Payer: MEDICARE

## 2019-04-17 DIAGNOSIS — I65.23 BILATERAL CAROTID ARTERY STENOSIS: ICD-10-CM

## 2019-04-17 PROCEDURE — 93880 EXTRACRANIAL BILAT STUDY: CPT | Performed by: INTERNAL MEDICINE

## 2019-04-22 DIAGNOSIS — I65.23 ASYMPTOMATIC CAROTID ARTERY STENOSIS, BILATERAL: ICD-10-CM

## 2019-04-25 ENCOUNTER — TELEPHONE (OUTPATIENT)
Dept: CARDIOLOGY | Age: 74
End: 2019-04-25

## 2019-05-09 NOTE — TELEPHONE ENCOUNTER
Levothyroxine Sodium 88 MCG Oral Tab   Fosinopril Sodium 20 MG Oral Tab   SEND TO [facility]

## 2019-05-09 NOTE — TELEPHONE ENCOUNTER
LOV: 4/2/19 for annual health exam  TSH: 4/2/19    Levothyroxine Sodium 88 MCG Oral Tab 90 tablet 0 1/29/2019    Sig:   Take 1 tablet (88 mcg total) by mouth before breakfast.     Route:   Oral       Fosinopril Sodium 20 MG Oral Tab 90 tablet 3 4/17/2018

## 2019-05-10 DIAGNOSIS — E11.22 DIABETES MELLITUS WITH STAGE 3 CHRONIC KIDNEY DISEASE (HCC): ICD-10-CM

## 2019-05-10 DIAGNOSIS — E78.2 MIXED HYPERLIPIDEMIA: ICD-10-CM

## 2019-05-10 DIAGNOSIS — N18.30 DIABETES MELLITUS WITH STAGE 3 CHRONIC KIDNEY DISEASE (HCC): ICD-10-CM

## 2019-05-10 DIAGNOSIS — I10 ESSENTIAL HYPERTENSION WITH GOAL BLOOD PRESSURE LESS THAN 140/90: ICD-10-CM

## 2019-05-10 RX ORDER — INSULIN GLARGINE 100 [IU]/ML
INJECTION, SOLUTION SUBCUTANEOUS
Qty: 45 PEN | Refills: 1 | Status: SHIPPED | OUTPATIENT
Start: 2019-05-10 | End: 2020-01-17

## 2019-05-10 RX ORDER — LEVOTHYROXINE SODIUM 88 UG/1
88 TABLET ORAL
Qty: 90 TABLET | Refills: 1 | Status: SHIPPED | OUTPATIENT
Start: 2019-05-10 | End: 2019-10-25

## 2019-05-10 RX ORDER — FOSINOPRIL SODIUM 20 MG/1
20 TABLET ORAL
Qty: 90 TABLET | Refills: 1 | Status: SHIPPED | OUTPATIENT
Start: 2019-05-10 | End: 2019-10-25

## 2019-05-10 NOTE — TELEPHONE ENCOUNTER
LOV: 4/2/19 for annual health exam    LANTUS SOLOSTAR 100 UNIT/ML Subcutaneous Solution Pen-injector 45 pen 1 1/8/2019    Sig:   To inject 40 units at night     Route:   (none)       Insulin Lispro 100 UNIT/ML Subcutaneous Solution       Sig:   Inject into

## 2019-07-30 DIAGNOSIS — I10 ESSENTIAL HYPERTENSION, BENIGN: ICD-10-CM

## 2019-07-30 NOTE — TELEPHONE ENCOUNTER
LOV: 4/2/19 for annual health exam  Lipid: 4/2/19    Metoprolol Succinate ER (TOPROL XL) 25 MG Oral Tablet 24 Hr 90 tablet 3 8/6/2018    Sig:   Take 1 tablet (25 mg total) by mouth daily.      Route:   Oral       atorvastatin 20 MG Oral Tab 90 tablet 1 2/15

## 2019-08-01 RX ORDER — ATORVASTATIN CALCIUM 20 MG/1
20 TABLET, FILM COATED ORAL NIGHTLY
Qty: 90 TABLET | Refills: 1 | Status: SHIPPED | OUTPATIENT
Start: 2019-08-01 | End: 2020-01-17

## 2019-08-01 RX ORDER — RIVAROXABAN 15 MG/1
15 TABLET, FILM COATED ORAL DAILY
Qty: 90 TABLET | Refills: 1 | Status: SHIPPED | OUTPATIENT
Start: 2019-08-01 | End: 2020-01-17

## 2019-08-01 RX ORDER — DILTIAZEM HYDROCHLORIDE 180 MG/1
180 CAPSULE, COATED, EXTENDED RELEASE ORAL DAILY
Qty: 90 CAPSULE | Refills: 1 | Status: SHIPPED | OUTPATIENT
Start: 2019-08-01 | End: 2020-01-17

## 2019-08-01 RX ORDER — METOPROLOL SUCCINATE 25 MG/1
25 TABLET, EXTENDED RELEASE ORAL DAILY
Qty: 90 TABLET | Refills: 1 | Status: SHIPPED | OUTPATIENT
Start: 2019-08-01 | End: 2020-01-17

## 2019-09-30 ENCOUNTER — OFFICE VISIT (OUTPATIENT)
Dept: FAMILY MEDICINE CLINIC | Facility: CLINIC | Age: 74
End: 2019-09-30
Payer: MEDICARE

## 2019-09-30 VITALS
HEART RATE: 91 BPM | OXYGEN SATURATION: 98 % | DIASTOLIC BLOOD PRESSURE: 82 MMHG | RESPIRATION RATE: 16 BRPM | TEMPERATURE: 97 F | SYSTOLIC BLOOD PRESSURE: 130 MMHG

## 2019-09-30 DIAGNOSIS — E03.8 HYPOTHYROIDISM DUE TO HASHIMOTO'S THYROIDITIS: ICD-10-CM

## 2019-09-30 DIAGNOSIS — Z23 NEED FOR VACCINATION: ICD-10-CM

## 2019-09-30 DIAGNOSIS — I10 ESSENTIAL HYPERTENSION, BENIGN: ICD-10-CM

## 2019-09-30 DIAGNOSIS — E06.3 HYPOTHYROIDISM DUE TO HASHIMOTO'S THYROIDITIS: ICD-10-CM

## 2019-09-30 DIAGNOSIS — N18.30 DIABETES MELLITUS WITH STAGE 3 CHRONIC KIDNEY DISEASE (HCC): Primary | ICD-10-CM

## 2019-09-30 DIAGNOSIS — E78.2 MIXED HYPERLIPIDEMIA: ICD-10-CM

## 2019-09-30 DIAGNOSIS — E11.22 DIABETES MELLITUS WITH STAGE 3 CHRONIC KIDNEY DISEASE (HCC): Primary | ICD-10-CM

## 2019-09-30 LAB
ALBUMIN SERPL-MCNC: 3.5 G/DL (ref 3.4–5)
ALBUMIN/GLOB SERPL: 0.8 {RATIO} (ref 1–2)
ALP LIVER SERPL-CCNC: 123 U/L (ref 45–117)
ALT SERPL-CCNC: 25 U/L (ref 16–61)
ANION GAP SERPL CALC-SCNC: 3 MMOL/L (ref 0–18)
AST SERPL-CCNC: 24 U/L (ref 15–37)
BILIRUB SERPL-MCNC: 1.6 MG/DL (ref 0.1–2)
BUN BLD-MCNC: 24 MG/DL (ref 7–18)
BUN/CREAT SERPL: 18.8 (ref 10–20)
CALCIUM BLD-MCNC: 8.9 MG/DL (ref 8.5–10.1)
CHLORIDE SERPL-SCNC: 110 MMOL/L (ref 98–112)
CHOLEST SMN-MCNC: 111 MG/DL (ref ?–200)
CO2 SERPL-SCNC: 28 MMOL/L (ref 21–32)
CREAT BLD-MCNC: 1.28 MG/DL (ref 0.7–1.3)
GLOBULIN PLAS-MCNC: 4.4 G/DL (ref 2.8–4.4)
GLUCOSE BLD-MCNC: 64 MG/DL (ref 70–99)
HDLC SERPL-MCNC: 51 MG/DL (ref 40–59)
LDLC SERPL CALC-MCNC: 51 MG/DL (ref ?–100)
M PROTEIN MFR SERPL ELPH: 7.9 G/DL (ref 6.4–8.2)
NONHDLC SERPL-MCNC: 60 MG/DL (ref ?–130)
OSMOLALITY SERPL CALC.SUM OF ELEC: 294 MOSM/KG (ref 275–295)
POTASSIUM SERPL-SCNC: 4.3 MMOL/L (ref 3.5–5.1)
SODIUM SERPL-SCNC: 141 MMOL/L (ref 136–145)
TRIGL SERPL-MCNC: 45 MG/DL (ref 30–149)
TSI SER-ACNC: 2.97 MIU/ML (ref 0.36–3.74)
VLDLC SERPL CALC-MCNC: 9 MG/DL (ref 0–30)

## 2019-09-30 PROCEDURE — 80053 COMPREHEN METABOLIC PANEL: CPT | Performed by: FAMILY MEDICINE

## 2019-09-30 PROCEDURE — 99214 OFFICE O/P EST MOD 30 MIN: CPT | Performed by: FAMILY MEDICINE

## 2019-09-30 PROCEDURE — 90662 IIV NO PRSV INCREASED AG IM: CPT | Performed by: FAMILY MEDICINE

## 2019-09-30 PROCEDURE — G0008 ADMIN INFLUENZA VIRUS VAC: HCPCS | Performed by: FAMILY MEDICINE

## 2019-09-30 PROCEDURE — 83036 HEMOGLOBIN GLYCOSYLATED A1C: CPT | Performed by: FAMILY MEDICINE

## 2019-09-30 PROCEDURE — 84443 ASSAY THYROID STIM HORMONE: CPT | Performed by: FAMILY MEDICINE

## 2019-09-30 PROCEDURE — 80061 LIPID PANEL: CPT | Performed by: FAMILY MEDICINE

## 2019-09-30 NOTE — PROGRESS NOTES
CC: meds and labs check    HPI:    1. Diabetes mellitus with stage 3 chronic kidney disease (HCC)  Chronic stable compliant with medication. Due for labs    2. Mixed hyperlipidemia  Chronic stable compliant with medication due for labs.     3. Essential hy diabetes E11.22. Disp: 400 each Rfl: 0   insulin glargine 100 UNIT/ML Subcutaneous Solution Inject 40 Units into the skin nightly. Disp:  Rfl:    Insulin Lispro 100 UNIT/ML Subcutaneous Solution Inject into the skin 3 (three) times daily before meals.  Per comment: NON-SMOKER    Alcohol use: No    Drug use: No      EXAM:   /82   Pulse 91   Temp 97.3 °F (36.3 °C) (Temporal)   Resp 16   SpO2 98%     Heart: Irregular  Lungs clear to auscultation bilaterally    A/P:   Diabetes mellitus with stage 3 chronic

## 2019-10-02 ENCOUNTER — TELEPHONE (OUTPATIENT)
Dept: FAMILY MEDICINE CLINIC | Facility: CLINIC | Age: 74
End: 2019-10-02

## 2019-10-02 NOTE — TELEPHONE ENCOUNTER
Received fax from [facility] [employer]. Patient needing repairs and/or replaement parts on their power mobility devise and need authorization from PCP.     Forms for MY to sign in yellow folder

## 2019-10-03 ENCOUNTER — TELEPHONE (OUTPATIENT)
Dept: FAMILY MEDICINE CLINIC | Facility: CLINIC | Age: 74
End: 2019-10-03

## 2019-10-14 ENCOUNTER — APPOINTMENT (OUTPATIENT)
Dept: CARDIOLOGY | Age: 74
End: 2019-10-14

## 2019-10-22 NOTE — TELEPHONE ENCOUNTER
Levothyroxine Sodium :     Levothyroxine Sodium 88 MCG Oral Tab 90 tablet 1 5/10/2019     Sig - Route:  Take 1 tablet (88 mcg total) by mouth before breakfast. - Oral      Last TSH was 9/30/19 at 2.970    Fosinopril Sodium:     Fosinopril Sodium 20 MG Oral

## 2019-10-25 RX ORDER — LEVOTHYROXINE SODIUM 88 UG/1
88 TABLET ORAL
Qty: 90 TABLET | Refills: 1 | Status: SHIPPED | OUTPATIENT
Start: 2019-10-25 | End: 2020-04-08

## 2019-10-25 RX ORDER — FOSINOPRIL SODIUM 20 MG/1
20 TABLET ORAL
Qty: 90 TABLET | Refills: 1 | Status: SHIPPED | OUTPATIENT
Start: 2019-10-25 | End: 2020-04-09

## 2020-01-17 ENCOUNTER — TELEPHONE (OUTPATIENT)
Dept: FAMILY MEDICINE CLINIC | Facility: CLINIC | Age: 75
End: 2020-01-17

## 2020-01-17 DIAGNOSIS — N18.30 DIABETES MELLITUS WITH STAGE 3 CHRONIC KIDNEY DISEASE (HCC): ICD-10-CM

## 2020-01-17 DIAGNOSIS — E11.22 DIABETES MELLITUS WITH STAGE 3 CHRONIC KIDNEY DISEASE (HCC): ICD-10-CM

## 2020-01-17 DIAGNOSIS — E78.2 MIXED HYPERLIPIDEMIA: ICD-10-CM

## 2020-01-17 DIAGNOSIS — I10 ESSENTIAL HYPERTENSION, BENIGN: ICD-10-CM

## 2020-01-17 DIAGNOSIS — I10 ESSENTIAL HYPERTENSION WITH GOAL BLOOD PRESSURE LESS THAN 140/90: ICD-10-CM

## 2020-01-17 RX ORDER — INSULIN LISPRO 100 [IU]/ML
INJECTION, SOLUTION INTRAVENOUS; SUBCUTANEOUS
Qty: 75 ML | Refills: 0 | Status: SHIPPED | OUTPATIENT
Start: 2020-01-17 | End: 2020-07-20

## 2020-01-17 RX ORDER — RIVAROXABAN 15 MG/1
15 TABLET, FILM COATED ORAL DAILY
Qty: 90 TABLET | Refills: 0 | Status: SHIPPED | OUTPATIENT
Start: 2020-01-17 | End: 2020-04-09

## 2020-01-17 RX ORDER — ATORVASTATIN CALCIUM 20 MG/1
20 TABLET, FILM COATED ORAL NIGHTLY
Qty: 90 TABLET | Refills: 0 | Status: SHIPPED | OUTPATIENT
Start: 2020-01-17 | End: 2020-04-09

## 2020-01-17 RX ORDER — INSULIN GLARGINE 100 [IU]/ML
INJECTION, SOLUTION SUBCUTANEOUS
Qty: 45 PEN | Refills: 0 | Status: SHIPPED | OUTPATIENT
Start: 2020-01-17 | End: 2020-04-28

## 2020-01-17 RX ORDER — METOPROLOL SUCCINATE 25 MG/1
25 TABLET, EXTENDED RELEASE ORAL DAILY
Qty: 90 TABLET | Refills: 0 | Status: SHIPPED | OUTPATIENT
Start: 2020-01-17 | End: 2020-04-09

## 2020-01-17 RX ORDER — DILTIAZEM HYDROCHLORIDE 180 MG/1
180 CAPSULE, COATED, EXTENDED RELEASE ORAL DAILY
Qty: 90 CAPSULE | Refills: 0 | Status: SHIPPED | OUTPATIENT
Start: 2020-01-17 | End: 2020-04-09

## 2020-01-17 NOTE — TELEPHONE ENCOUNTER
LOV: 9/30/19 for diabetes  CMP: 9/30/19  A1C: 9/30/19  Lipid: 9/30/19    Last refill: 8/1/19 #90 R1  Patient passes rx protocol  Meds filled.

## 2020-01-17 NOTE — TELEPHONE ENCOUNTER
Pt needs a refill on Humalog, Lantus, Xarelto, Atorvastin, Metoprolol and Diltiazem [facility]CI and also BD Ultra Fine Pen needles Pee 29 G 12.7 mm length would [facility].

## 2020-01-21 NOTE — TELEPHONE ENCOUNTER
Requesting ne Breeze 2 monitor and test strips sent to [facility] mail order pharmacy.      Last test strips refilled 810/17 #450 3 refills  Last A1c 7.2 on 9/30/19  Last OV 9/30/19      Diabetic Supplies Protocol Passed1/21 4:31 PM   Appointment in the past 15

## 2020-01-25 ENCOUNTER — MED REC SCAN ONLY (OUTPATIENT)
Dept: FAMILY MEDICINE CLINIC | Facility: CLINIC | Age: 75
End: 2020-01-25

## 2020-02-03 ENCOUNTER — TELEPHONE (OUTPATIENT)
Dept: FAMILY MEDICINE CLINIC | Facility: CLINIC | Age: 75
End: 2020-02-03

## 2020-02-03 NOTE — TELEPHONE ENCOUNTER
Pt's daughter Lorenza Jones called stated her father needs Breeze 2 monitor test strips but [employer] Mail order faxed something totally different and pt can not use these do to him being paralized. So she stated if we call the prior auth number [id number] and explain to them why he has to have the [facility] 2 monitor test strips.

## 2020-02-03 NOTE — TELEPHONE ENCOUNTER
Called Shae at the # below. Shae submitted the request for the [facility] 2 strips. We should have a response within 24-48 hours via fax.     To call for an update: [phone]  Reference # [id number]

## 2020-02-13 ENCOUNTER — TELEPHONE (OUTPATIENT)
Dept: CARDIOLOGY | Age: 75
End: 2020-02-13

## 2020-02-13 DIAGNOSIS — I10 HYPERTENSION, BENIGN: ICD-10-CM

## 2020-02-13 DIAGNOSIS — I48.19 PERSISTENT ATRIAL FIBRILLATION (CMD): Primary | ICD-10-CM

## 2020-03-30 ENCOUNTER — TELEPHONE (OUTPATIENT)
Dept: CARDIOLOGY | Age: 75
End: 2020-03-30

## 2020-04-01 ENCOUNTER — TELEPHONE (OUTPATIENT)
Dept: FAMILY MEDICINE CLINIC | Facility: CLINIC | Age: 75
End: 2020-04-01

## 2020-04-01 NOTE — TELEPHONE ENCOUNTER
No, for his safety he needs to reschedule for 2 months out and call us back then. This is not a good time for him to go to any healthcare facility unless there is an urgent / emergent reason.

## 2020-04-07 DIAGNOSIS — I10 ESSENTIAL HYPERTENSION, BENIGN: ICD-10-CM

## 2020-04-08 RX ORDER — LEVOTHYROXINE SODIUM 88 UG/1
TABLET ORAL
Qty: 90 TABLET | Refills: 0 | Status: SHIPPED | OUTPATIENT
Start: 2020-04-08 | End: 2020-07-02

## 2020-04-08 NOTE — TELEPHONE ENCOUNTER
Thyroid Supplements Protocol Passed4/7 5:05 PM   TSH test in past 12 months    TSH value between 0.350 and 5.500 IU/ml    Appointment in past 12 or next 3 months     Levothyroxine sent per protocol  Last TSH 2.970 on 9/30/19    Fosinopril 10/25/19 #90 1 re

## 2020-04-09 RX ORDER — DILTIAZEM HYDROCHLORIDE 180 MG/1
CAPSULE, EXTENDED RELEASE ORAL
Qty: 90 CAPSULE | Refills: 0 | Status: SHIPPED | OUTPATIENT
Start: 2020-04-09 | End: 2020-07-02

## 2020-04-09 RX ORDER — METOPROLOL SUCCINATE 25 MG/1
TABLET, EXTENDED RELEASE ORAL
Qty: 90 TABLET | Refills: 0 | Status: SHIPPED | OUTPATIENT
Start: 2020-04-09 | End: 2020-07-02

## 2020-04-09 RX ORDER — RIVAROXABAN 15 MG/1
TABLET, FILM COATED ORAL
Qty: 90 TABLET | Refills: 0 | Status: SHIPPED | OUTPATIENT
Start: 2020-04-09 | End: 2020-07-02

## 2020-04-09 RX ORDER — FOSINOPRIL SODIUM 20 MG/1
TABLET ORAL
Qty: 90 TABLET | Refills: 1 | Status: SHIPPED | OUTPATIENT
Start: 2020-04-09 | End: 2020-07-02

## 2020-04-09 RX ORDER — ATORVASTATIN CALCIUM 20 MG/1
TABLET, FILM COATED ORAL
Qty: 90 TABLET | Refills: 0 | Status: SHIPPED | OUTPATIENT
Start: 2020-04-09 | End: 2020-07-02

## 2020-04-20 ENCOUNTER — APPOINTMENT (OUTPATIENT)
Dept: CARDIOLOGY | Age: 75
End: 2020-04-20

## 2020-04-28 DIAGNOSIS — E78.2 MIXED HYPERLIPIDEMIA: ICD-10-CM

## 2020-04-28 DIAGNOSIS — E11.22 DIABETES MELLITUS WITH STAGE 3 CHRONIC KIDNEY DISEASE (HCC): ICD-10-CM

## 2020-04-28 DIAGNOSIS — N18.30 DIABETES MELLITUS WITH STAGE 3 CHRONIC KIDNEY DISEASE (HCC): ICD-10-CM

## 2020-04-28 DIAGNOSIS — I10 ESSENTIAL HYPERTENSION WITH GOAL BLOOD PRESSURE LESS THAN 140/90: ICD-10-CM

## 2020-04-28 RX ORDER — INSULIN LISPRO 100 [IU]/ML
INJECTION, SOLUTION INTRAVENOUS; SUBCUTANEOUS
Qty: 75 ML | Refills: 0 | Status: SHIPPED | OUTPATIENT
Start: 2020-04-28 | End: 2020-11-19

## 2020-04-28 RX ORDER — INSULIN GLARGINE 100 [IU]/ML
INJECTION, SOLUTION SUBCUTANEOUS
Qty: 45 ML | Refills: 0 | Status: SHIPPED | OUTPATIENT
Start: 2020-04-28 | End: 2020-08-10

## 2020-04-28 NOTE — TELEPHONE ENCOUNTER
Spoke to wife of patient. Scheduled DM f/u telephone visit for 5/5/20. Verified that insurance has not changed.    Future Appointments   Date Time Provider Mariluz Sapp   5/5/2020  1:00 PM Alanda Scheuermann, DO [facility]   7/20/2020 11:00 AM Tee

## 2020-04-28 NOTE — TELEPHONE ENCOUNTER
Lispro 1/17/20 75mL 0 refills  solostar 1/17/20 #45 pen 0 refill  Last a1c 7.2 on 9/30/19  Last OV 9/30/19 for DM  Future Appointments   Date Time Provider Mariluz Sapp   7/20/2020 11:00 AM DO CHIARA Meraz [facility] [place]

## 2020-05-05 ENCOUNTER — VIRTUAL PHONE E/M (OUTPATIENT)
Dept: FAMILY MEDICINE CLINIC | Facility: CLINIC | Age: 75
End: 2020-05-05
Payer: MEDICARE

## 2020-05-05 DIAGNOSIS — N18.30 DIABETES MELLITUS WITH STAGE 3 CHRONIC KIDNEY DISEASE (HCC): Primary | ICD-10-CM

## 2020-05-05 DIAGNOSIS — E11.22 DIABETES MELLITUS WITH STAGE 3 CHRONIC KIDNEY DISEASE (HCC): Primary | ICD-10-CM

## 2020-05-05 PROCEDURE — 99441 PHONE E/M BY PHYS 5-10 MIN: CPT | Performed by: FAMILY MEDICINE

## 2020-05-05 NOTE — PROGRESS NOTES
Virtual Telephone Check-In    Dave Mckay verbally consents to a Virtual/Telephone Check-In visit on 05/05/20. Patient understands and accepts financial responsibility for any deductible, co-insurance and/or co-pays associated with this service.

## 2020-05-20 ENCOUNTER — TELEPHONE (OUTPATIENT)
Dept: CARDIOLOGY | Age: 75
End: 2020-05-20

## 2020-06-02 NOTE — TELEPHONE ENCOUNTER
LVM for pt to contact office back no answer    Pt wife called wants to know if Lab results are back.  Wife is on pt's Parul Ruiz, Please call [phone]

## 2020-07-02 DIAGNOSIS — I10 ESSENTIAL HYPERTENSION, BENIGN: ICD-10-CM

## 2020-07-02 RX ORDER — RIVAROXABAN 15 MG/1
TABLET, FILM COATED ORAL
Qty: 30 TABLET | Refills: 0 | Status: SHIPPED | OUTPATIENT
Start: 2020-07-02 | End: 2021-09-24

## 2020-07-02 RX ORDER — DILTIAZEM HYDROCHLORIDE 180 MG/1
CAPSULE, COATED, EXTENDED RELEASE ORAL
Qty: 30 CAPSULE | Refills: 0 | Status: SHIPPED | OUTPATIENT
Start: 2020-07-02 | End: 2020-08-26

## 2020-07-02 RX ORDER — FOSINOPRIL SODIUM 20 MG/1
TABLET ORAL
Qty: 90 TABLET | Refills: 1 | Status: SHIPPED | OUTPATIENT
Start: 2020-07-02 | End: 2021-02-16

## 2020-07-02 RX ORDER — ATORVASTATIN CALCIUM 20 MG/1
TABLET, FILM COATED ORAL
Qty: 90 TABLET | Refills: 0 | Status: SHIPPED | OUTPATIENT
Start: 2020-07-02 | End: 2020-09-21

## 2020-07-02 RX ORDER — METOPROLOL SUCCINATE 25 MG/1
TABLET, EXTENDED RELEASE ORAL
Qty: 90 TABLET | Refills: 0 | Status: SHIPPED | OUTPATIENT
Start: 2020-07-02 | End: 2020-09-21

## 2020-07-02 RX ORDER — LEVOTHYROXINE SODIUM 88 UG/1
TABLET ORAL
Qty: 90 TABLET | Refills: 0 | Status: SHIPPED | OUTPATIENT
Start: 2020-07-02 | End: 2020-09-21

## 2020-07-02 NOTE — TELEPHONE ENCOUNTER
Hypertension Medications Protocol Passed7/2 2:16 PM   CMP or BMP in past 12 months    Last serum creatinine< 2.0    Appointment in past 6 or next 3 months       Thyroid Supplements Protocol Passed7/2 2:16 PM   TSH test in past 12 months    TSH value sandoval

## 2020-07-20 ENCOUNTER — OFFICE VISIT (OUTPATIENT)
Dept: FAMILY MEDICINE CLINIC | Facility: CLINIC | Age: 75
End: 2020-07-20
Payer: MEDICARE

## 2020-07-20 VITALS
SYSTOLIC BLOOD PRESSURE: 128 MMHG | DIASTOLIC BLOOD PRESSURE: 82 MMHG | OXYGEN SATURATION: 98 % | HEART RATE: 83 BPM | RESPIRATION RATE: 18 BRPM | HEIGHT: 73 IN | TEMPERATURE: 98 F | BODY MASS INDEX: 43 KG/M2

## 2020-07-20 DIAGNOSIS — Z00.00 ENCOUNTER FOR ANNUAL HEALTH EXAMINATION: Primary | ICD-10-CM

## 2020-07-20 DIAGNOSIS — Z13.1 SCREENING FOR DIABETES MELLITUS (DM): ICD-10-CM

## 2020-07-20 DIAGNOSIS — E11.69 HYPERLIPIDEMIA ASSOCIATED WITH TYPE 2 DIABETES MELLITUS (HCC): ICD-10-CM

## 2020-07-20 DIAGNOSIS — G82.20 PARAPLEGIA (HCC): ICD-10-CM

## 2020-07-20 DIAGNOSIS — E11.59 HYPERTENSION ASSOCIATED WITH DIABETES (HCC): Chronic | ICD-10-CM

## 2020-07-20 DIAGNOSIS — R97.20 ELEVATED PSA: ICD-10-CM

## 2020-07-20 DIAGNOSIS — E78.5 HYPERLIPIDEMIA ASSOCIATED WITH TYPE 2 DIABETES MELLITUS (HCC): ICD-10-CM

## 2020-07-20 DIAGNOSIS — I15.2 HYPERTENSION ASSOCIATED WITH DIABETES (HCC): Chronic | ICD-10-CM

## 2020-07-20 DIAGNOSIS — E66.01 SEVERE OBESITY (BMI 35.0-39.9) WITH COMORBIDITY (HCC): Chronic | ICD-10-CM

## 2020-07-20 DIAGNOSIS — Z13.6 SCREENING FOR CARDIOVASCULAR CONDITION: ICD-10-CM

## 2020-07-20 DIAGNOSIS — I48.91 ATRIAL FIBRILLATION WITH RAPID VENTRICULAR RESPONSE (HCC): ICD-10-CM

## 2020-07-20 DIAGNOSIS — N18.30 STAGE 3 CHRONIC KIDNEY DISEASE (HCC): Chronic | ICD-10-CM

## 2020-07-20 PROBLEM — N17.9 ACUTE RENAL FAILURE SUPERIMPOSED ON CHRONIC KIDNEY DISEASE, UNSPECIFIED CKD STAGE, UNSPECIFIED ACUTE RENAL FAILURE TYPE (HCC): Status: RESOLVED | Noted: 2018-09-06 | Resolved: 2020-07-20

## 2020-07-20 PROBLEM — N18.9 ACUTE RENAL FAILURE SUPERIMPOSED ON CHRONIC KIDNEY DISEASE, UNSPECIFIED CKD STAGE, UNSPECIFIED ACUTE RENAL FAILURE TYPE (HCC): Status: RESOLVED | Noted: 2018-09-06 | Resolved: 2020-07-20

## 2020-07-20 PROBLEM — R79.1 SUPRATHERAPEUTIC INR: Status: RESOLVED | Noted: 2018-09-06 | Resolved: 2020-07-20

## 2020-07-20 PROBLEM — R53.1 GENERAL WEAKNESS: Status: RESOLVED | Noted: 2018-09-06 | Resolved: 2020-07-20

## 2020-07-20 PROBLEM — N18.9 ACUTE RENAL FAILURE SUPERIMPOSED ON CHRONIC KIDNEY DISEASE, UNSPECIFIED CKD STAGE, UNSPECIFIED ACUTE RENAL FAILURE TYPE: Status: RESOLVED | Noted: 2018-09-06 | Resolved: 2020-07-20

## 2020-07-20 PROBLEM — N17.9 ACUTE RENAL FAILURE SUPERIMPOSED ON CHRONIC KIDNEY DISEASE, UNSPECIFIED CKD STAGE, UNSPECIFIED ACUTE RENAL FAILURE TYPE: Status: RESOLVED | Noted: 2018-09-06 | Resolved: 2020-07-20

## 2020-07-20 LAB
ALBUMIN SERPL-MCNC: 3.3 G/DL (ref 3.4–5)
ALBUMIN/GLOB SERPL: 0.8 {RATIO} (ref 1–2)
ALP LIVER SERPL-CCNC: 120 U/L (ref 45–117)
ALT SERPL-CCNC: 22 U/L (ref 16–61)
ANION GAP SERPL CALC-SCNC: 3 MMOL/L (ref 0–18)
AST SERPL-CCNC: 21 U/L (ref 15–37)
BASOPHILS # BLD AUTO: 0.09 X10(3) UL (ref 0–0.2)
BASOPHILS NFR BLD AUTO: 0.8 %
BILIRUB SERPL-MCNC: 1.4 MG/DL (ref 0.1–2)
BUN BLD-MCNC: 28 MG/DL (ref 7–18)
BUN/CREAT SERPL: 21.9 (ref 10–20)
CALCIUM BLD-MCNC: 9 MG/DL (ref 8.5–10.1)
CHLORIDE SERPL-SCNC: 110 MMOL/L (ref 98–112)
CHOLEST SMN-MCNC: 115 MG/DL (ref ?–200)
CO2 SERPL-SCNC: 28 MMOL/L (ref 21–32)
COMPLEXED PSA SERPL-MCNC: 22.2 NG/ML (ref ?–4)
CREAT BLD-MCNC: 1.28 MG/DL (ref 0.7–1.3)
DEPRECATED RDW RBC AUTO: 49.5 FL (ref 35.1–46.3)
EOSINOPHIL # BLD AUTO: 0.37 X10(3) UL (ref 0–0.7)
EOSINOPHIL NFR BLD AUTO: 3.3 %
ERYTHROCYTE [DISTWIDTH] IN BLOOD BY AUTOMATED COUNT: 13.3 % (ref 11–15)
EST. AVERAGE GLUCOSE BLD GHB EST-MCNC: 134 MG/DL (ref 68–126)
GLOBULIN PLAS-MCNC: 4.3 G/DL (ref 2.8–4.4)
GLUCOSE BLD-MCNC: 53 MG/DL (ref 70–99)
HBA1C MFR BLD HPLC: 6.3 % (ref ?–5.7)
HCT VFR BLD AUTO: 42.3 % (ref 39–53)
HDLC SERPL-MCNC: 52 MG/DL (ref 40–59)
HGB BLD-MCNC: 13.4 G/DL (ref 13–17.5)
IMM GRANULOCYTES # BLD AUTO: 0.04 X10(3) UL (ref 0–1)
IMM GRANULOCYTES NFR BLD: 0.4 %
LDLC SERPL CALC-MCNC: 56 MG/DL (ref ?–100)
LYMPHOCYTES # BLD AUTO: 1.68 X10(3) UL (ref 1–4)
LYMPHOCYTES NFR BLD AUTO: 15 %
M PROTEIN MFR SERPL ELPH: 7.6 G/DL (ref 6.4–8.2)
MCH RBC QN AUTO: 31.8 PG (ref 26–34)
MCHC RBC AUTO-ENTMCNC: 31.7 G/DL (ref 31–37)
MCV RBC AUTO: 100.5 FL (ref 80–100)
MONOCYTES # BLD AUTO: 0.82 X10(3) UL (ref 0.1–1)
MONOCYTES NFR BLD AUTO: 7.3 %
NEUTROPHILS # BLD AUTO: 8.2 X10 (3) UL (ref 1.5–7.7)
NEUTROPHILS # BLD AUTO: 8.2 X10(3) UL (ref 1.5–7.7)
NEUTROPHILS NFR BLD AUTO: 73.2 %
NONHDLC SERPL-MCNC: 63 MG/DL (ref ?–130)
OSMOLALITY SERPL CALC.SUM OF ELEC: 295 MOSM/KG (ref 275–295)
PATIENT FASTING Y/N/NP: YES
PATIENT FASTING Y/N/NP: YES
PLATELET # BLD AUTO: 218 10(3)UL (ref 150–450)
POTASSIUM SERPL-SCNC: 4 MMOL/L (ref 3.5–5.1)
RBC # BLD AUTO: 4.21 X10(6)UL (ref 3.8–5.8)
SODIUM SERPL-SCNC: 141 MMOL/L (ref 136–145)
TRIGL SERPL-MCNC: 36 MG/DL (ref 30–149)
TSI SER-ACNC: 3.06 MIU/ML (ref 0.36–3.74)
VLDLC SERPL CALC-MCNC: 7 MG/DL (ref 0–30)
WBC # BLD AUTO: 11.2 X10(3) UL (ref 4–11)

## 2020-07-20 PROCEDURE — 3074F SYST BP LT 130 MM HG: CPT | Performed by: FAMILY MEDICINE

## 2020-07-20 PROCEDURE — 3008F BODY MASS INDEX DOCD: CPT | Performed by: FAMILY MEDICINE

## 2020-07-20 PROCEDURE — 3079F DIAST BP 80-89 MM HG: CPT | Performed by: FAMILY MEDICINE

## 2020-07-20 PROCEDURE — G0439 PPPS, SUBSEQ VISIT: HCPCS | Performed by: FAMILY MEDICINE

## 2020-07-20 PROCEDURE — 80061 LIPID PANEL: CPT | Performed by: FAMILY MEDICINE

## 2020-07-20 PROCEDURE — 85025 COMPLETE CBC W/AUTO DIFF WBC: CPT | Performed by: FAMILY MEDICINE

## 2020-07-20 PROCEDURE — 84443 ASSAY THYROID STIM HORMONE: CPT | Performed by: FAMILY MEDICINE

## 2020-07-20 PROCEDURE — 99397 PER PM REEVAL EST PAT 65+ YR: CPT | Performed by: FAMILY MEDICINE

## 2020-07-20 PROCEDURE — 96160 PT-FOCUSED HLTH RISK ASSMT: CPT | Performed by: FAMILY MEDICINE

## 2020-07-20 PROCEDURE — 80053 COMPREHEN METABOLIC PANEL: CPT | Performed by: FAMILY MEDICINE

## 2020-07-20 PROCEDURE — 83036 HEMOGLOBIN GLYCOSYLATED A1C: CPT | Performed by: FAMILY MEDICINE

## 2020-07-20 NOTE — PROGRESS NOTES
HPI:   Ramiro Hannah is a 66year old male who presents for a MA (Medicare Advantage) [facility] (Once per calendar year). Doing well - at baseline. He will wean off the citalopram completely - only taking 1 tab per week.    His last annual asses Years since quittin.5      Smokeless tobacco: Never Used      Tobacco comment: NON-SMOKER       Mr. Eliezer Lopez already takes aspirin and has it on his medication list.   CAGE Alcohol screening   Karie Kaurmaurilio was screened for Alcohol abuse and had a SUCCINATE ER 25 MG Oral Tablet 24 Hr, TAKE 1 TABLET EVERY DAY  ATORVASTATIN 20 MG Oral Tab, TAKE 1 TABLET EVERY NIGHT  XARELTO 15 MG Oral Tab, TAKE 1 TABLET EVERY DAY  DILTIAZEM HCL ER COATED BEADS 180 MG Oral Capsule SR 24 Hr, TAKE 1 CAPSULE EVERY DAY  LA hyperlipidemia (2/7/2008), Pain in joint, lower leg (3/3/2009), PONV (postoperative nausea and vomiting), Proteinuria (3/8/2008), Stroke ([facility] Type II or unspecified type diabetes mellitus with renal manifestations, uncontrolled(250.42) (2/7/2008), Type I General appearance: alert, appears stated age and cooperative  Lungs: clear to auscultation bilaterally  Heart: irregularly irregular rhythm  Abdomen: soft, non-tender; bowel sounds normal; no masses,  no organomegaly  Extremities: edema noted in both le Future  - COMP METABOLIC PANEL (14); Future  - HEMOGLOBIN A1C; Future  - TSH W REFLEX TO FREE T4; Future  - CBC WITH DIFFERENTIAL WITH PLATELET;  Future  - PSA SCREEN  - LIPID PANEL  - COMP METABOLIC PANEL (14)  - HEMOGLOBIN A1C  - TSH W REFLEX TO FREE T4 separate sheet to patient  PREVENTATIVE SERVICES  INDICATIONS AND SCHEDULE Internal Lab or Procedure External Lab or Procedure   Diabetes Screening      HbgA1C   Annually HGBA1C (%)   Date Value   06/13/2014 7.9 (H)     HgbA1C (%)   Date Value   09/30/2019 the mentally retarded   Persons who live in the same house as a HepB virus carrier   Homosexual men   Illicit injectable drug abusers     Tetanus Toxoid  Only covered with a cut with metal- TD and TDaP Not covered by Medicare Part B) No vaccine history fou

## 2020-07-20 NOTE — PATIENT INSTRUCTIONS
Jalen Escobar's SCREENING SCHEDULE   Tests on this list are recommended by your physician but may not be covered, or covered at this frequency, by your insurer. Please check with your insurance carrier before scheduling to verify coverage.     ASHLIE meet one of the following criteria:   • Men who are 73-68 years old and have smoked more than 100 cigarettes in their lifetime   • Anyone with a family history    Colorectal Cancer Screening Covered up to Age 76     Colonoscopy Screen   Covered every 10 ye FREE, >=1YEARS OF AGE    Please get every year    Pneumococcal 13 (Prevnar)  Covered Once after 65 Orders placed or performed in visit on 09/05/17   • PNEUMOCOCCAL VACC, 13 ALEX IM   Orders placed or performed in visit on 12/27/16   • PNEUMOCOCCAL VACC, 13

## 2020-07-22 ENCOUNTER — TELEPHONE (OUTPATIENT)
Dept: FAMILY MEDICINE CLINIC | Facility: CLINIC | Age: 75
End: 2020-07-22

## 2020-07-22 NOTE — TELEPHONE ENCOUNTER
----- Message from Roseline Ramirez DO sent at 7/22/2020  1:00 PM CDT -----  Lab results reviewed and blood sugar is significantly better and the PSA continues to be elevated.   There is a new finding of an elevated white blood cell count and we simply need to

## 2020-08-08 DIAGNOSIS — I10 ESSENTIAL HYPERTENSION WITH GOAL BLOOD PRESSURE LESS THAN 140/90: ICD-10-CM

## 2020-08-08 DIAGNOSIS — E78.2 MIXED HYPERLIPIDEMIA: ICD-10-CM

## 2020-08-08 DIAGNOSIS — N18.30 DIABETES MELLITUS WITH STAGE 3 CHRONIC KIDNEY DISEASE (HCC): ICD-10-CM

## 2020-08-08 DIAGNOSIS — E11.22 DIABETES MELLITUS WITH STAGE 3 CHRONIC KIDNEY DISEASE (HCC): ICD-10-CM

## 2020-08-10 RX ORDER — INSULIN GLARGINE 100 [IU]/ML
INJECTION, SOLUTION SUBCUTANEOUS
Qty: 45 ML | Refills: 0 | Status: SHIPPED | OUTPATIENT
Start: 2020-08-10 | End: 2020-11-18

## 2020-08-10 NOTE — TELEPHONE ENCOUNTER
LOV 7/20/20 for an annual exam.  A1C on the above date: 6.3    LANTUS SOLOSTAR 100 UNIT/ML Subcutaneous Solution Pen-injector 45 mL 0 4/28/2020    Sig:   INJECT 40 UNITS SUBCUTANEOUSLY AT NIGHT     Route:   (none)     VENKAT:   Yes       No future appointment

## 2020-08-20 ENCOUNTER — TELEPHONE (OUTPATIENT)
Dept: FAMILY MEDICINE CLINIC | Facility: CLINIC | Age: 75
End: 2020-08-20

## 2020-08-20 NOTE — TELEPHONE ENCOUNTER
Patient's wife advised and verbalized understanding. Patient cannot walk - he is in a wheel chair. Will try some exercises in his chair.

## 2020-08-20 NOTE — TELEPHONE ENCOUNTER
Pt wife called, states pt has not had a bowl movement 5-6 days, pt has tried otc medication, but has not helped. Wants to know if there is something else he can do, or if something can be prescribed.    Pt wife is not on hipaa

## 2020-08-20 NOTE — TELEPHONE ENCOUNTER
Patient has not had BM in 5-6 days  Patient able to pass gas  Denies nausea/vomiting  Has tried - miralax and duralax with no results  Pt has not tried prune juice   Patient denies abdominal pain/cramping  No fever  Please advise.

## 2020-08-24 ENCOUNTER — OFFICE VISIT (OUTPATIENT)
Dept: CARDIOLOGY | Age: 75
End: 2020-08-24

## 2020-08-24 VITALS — SYSTOLIC BLOOD PRESSURE: 120 MMHG | BODY MASS INDEX: 32.26 KG/M2 | WEIGHT: 265 LBS | DIASTOLIC BLOOD PRESSURE: 80 MMHG

## 2020-08-24 DIAGNOSIS — I25.10 CORONARY ARTERY DISEASE INVOLVING NATIVE CORONARY ARTERY OF NATIVE HEART WITHOUT ANGINA PECTORIS: Primary | ICD-10-CM

## 2020-08-24 DIAGNOSIS — I65.23 ASYMPTOMATIC CAROTID ARTERY STENOSIS, BILATERAL: ICD-10-CM

## 2020-08-24 DIAGNOSIS — E78.00 PURE HYPERCHOLESTEROLEMIA: ICD-10-CM

## 2020-08-24 DIAGNOSIS — I48.19 PERSISTENT ATRIAL FIBRILLATION (CMD): ICD-10-CM

## 2020-08-24 DIAGNOSIS — Z79.01 CHRONIC ANTICOAGULATION: ICD-10-CM

## 2020-08-24 DIAGNOSIS — I10 HYPERTENSION, BENIGN: ICD-10-CM

## 2020-08-24 PROCEDURE — 3074F SYST BP LT 130 MM HG: CPT | Performed by: INTERNAL MEDICINE

## 2020-08-24 PROCEDURE — 99214 OFFICE O/P EST MOD 30 MIN: CPT | Performed by: INTERNAL MEDICINE

## 2020-08-24 ASSESSMENT — PATIENT HEALTH QUESTIONNAIRE - PHQ9
2. FEELING DOWN, DEPRESSED OR HOPELESS: SEVERAL DAYS
1. LITTLE INTEREST OR PLEASURE IN DOING THINGS: SEVERAL DAYS
CLINICAL INTERPRETATION OF PHQ9 SCORE: NO FURTHER SCREENING NEEDED
SUM OF ALL RESPONSES TO PHQ9 QUESTIONS 1 AND 2: 2
CLINICAL INTERPRETATION OF PHQ2 SCORE: NO FURTHER SCREENING NEEDED
SUM OF ALL RESPONSES TO PHQ9 QUESTIONS 1 AND 2: 2

## 2020-08-26 DIAGNOSIS — I10 ESSENTIAL HYPERTENSION, BENIGN: ICD-10-CM

## 2020-08-26 RX ORDER — DILTIAZEM HYDROCHLORIDE 180 MG/1
CAPSULE, COATED, EXTENDED RELEASE ORAL
Qty: 90 CAPSULE | Refills: 0 | Status: SHIPPED | OUTPATIENT
Start: 2020-08-26 | End: 2020-11-02

## 2020-08-26 NOTE — TELEPHONE ENCOUNTER
Last refilled on 7/2/20 for # 30 with 0 refills  Last OV 7/20/20  No future appointments. Thank you.

## 2020-09-21 RX ORDER — LEVOTHYROXINE SODIUM 88 UG/1
TABLET ORAL
Qty: 90 TABLET | Refills: 0 | Status: SHIPPED | OUTPATIENT
Start: 2020-09-21 | End: 2020-12-16

## 2020-09-21 RX ORDER — METOPROLOL SUCCINATE 25 MG/1
TABLET, EXTENDED RELEASE ORAL
Qty: 90 TABLET | Refills: 0 | Status: SHIPPED | OUTPATIENT
Start: 2020-09-21 | End: 2020-12-17

## 2020-09-21 RX ORDER — ATORVASTATIN CALCIUM 20 MG/1
TABLET, FILM COATED ORAL
Qty: 90 TABLET | Refills: 0 | Status: SHIPPED | OUTPATIENT
Start: 2020-09-21 | End: 2020-12-17

## 2020-09-21 NOTE — TELEPHONE ENCOUNTER
Thyroid Supplements Protocol Gibhme0609/21/2020 02:01 PM   TSH test in past 12 months Protocol Details    TSH value between 0.350 and 5.500 IU/ml     Appointment in past 12 or next 3 months        Hypertension Medications Protocol Rgsiar9109/21/2020 02:01 PM

## 2020-11-01 DIAGNOSIS — I10 ESSENTIAL HYPERTENSION, BENIGN: ICD-10-CM

## 2020-11-02 RX ORDER — DILTIAZEM HYDROCHLORIDE 180 MG/1
CAPSULE, COATED, EXTENDED RELEASE ORAL
Qty: 90 CAPSULE | Refills: 0 | Status: SHIPPED | OUTPATIENT
Start: 2020-11-02

## 2020-11-02 NOTE — TELEPHONE ENCOUNTER
Hypertension Medications Protocol Lrcxkd3311/01/2020 12:52 AM   CMP or BMP in past 12 months Protocol Details    Last serum creatinine< 2.0     Appointment in past 6 or next 3 months      Last OV 7/20/20  Last CMP 7/20/20  Last refill 8/26/20 #90 0 refill  R

## 2020-11-17 DIAGNOSIS — N18.30 DIABETES MELLITUS WITH STAGE 3 CHRONIC KIDNEY DISEASE (HCC): ICD-10-CM

## 2020-11-17 DIAGNOSIS — E78.2 MIXED HYPERLIPIDEMIA: ICD-10-CM

## 2020-11-17 DIAGNOSIS — E11.22 DIABETES MELLITUS WITH STAGE 3 CHRONIC KIDNEY DISEASE (HCC): ICD-10-CM

## 2020-11-17 DIAGNOSIS — I10 ESSENTIAL HYPERTENSION WITH GOAL BLOOD PRESSURE LESS THAN 140/90: ICD-10-CM

## 2020-11-17 NOTE — TELEPHONE ENCOUNTER
Last refilled on 08/10/2020  for 45 ML  with 0 rf. Last A1C : 07/20/20 = 6.3   Last seen on 07/20/20CC: MEDICARE WELLNESS   /82  No future appointments. Thank you. RX pended  Please advise.

## 2020-11-17 NOTE — TELEPHONE ENCOUNTER
Medication Detail    Medication Quantity Refills Start End   LANTUS SOLOSTAR 100 UNIT/ML Subcutaneous Solution Pen-injector 45 mL 0 8/10/2020    Sig:   INJECT 40 UNITS SUBCUTANEOUSLY AT NIGHT

## 2020-11-18 RX ORDER — INSULIN GLARGINE 100 [IU]/ML
40 INJECTION, SOLUTION SUBCUTANEOUS NIGHTLY
Qty: 45 ML | Refills: 0 | Status: SHIPPED | OUTPATIENT
Start: 2020-11-18 | End: 2021-03-24

## 2020-11-19 NOTE — TELEPHONE ENCOUNTER
Last refilled on 4/28/20 for # 75mL with 0 refills  Last labs a1c 6.3 on 7/20/20  Last OV 7/20/20   No future appointments. Thank you.

## 2020-11-20 RX ORDER — INSULIN LISPRO 100 [IU]/ML
INJECTION, SOLUTION INTRAVENOUS; SUBCUTANEOUS
Qty: 75 ML | Refills: 0 | Status: SHIPPED | OUTPATIENT
Start: 2020-11-20 | End: 2021-07-29

## 2020-12-16 RX ORDER — LEVOTHYROXINE SODIUM 88 UG/1
88 TABLET ORAL
Qty: 90 TABLET | Refills: 0 | Status: SHIPPED | OUTPATIENT
Start: 2020-12-16 | End: 2021-03-23

## 2020-12-16 NOTE — TELEPHONE ENCOUNTER
LOV 7/20/20 for an annual exam.  TSH done on the above date. LEVOTHYROXINE SODIUM 88 MCG Oral Tab 90 tablet 0 9/21/2020    Sig:   TAKE 1 TABLET BEFORE BREAKFAST. Route:   (none)       No future appointments.      Thyroid Supplements Protocol [id number]

## 2020-12-17 RX ORDER — ATORVASTATIN CALCIUM 20 MG/1
20 TABLET, FILM COATED ORAL NIGHTLY
Qty: 90 TABLET | Refills: 0 | Status: SHIPPED | OUTPATIENT
Start: 2020-12-17 | End: 2021-05-13

## 2020-12-17 RX ORDER — METOPROLOL SUCCINATE 25 MG/1
25 TABLET, EXTENDED RELEASE ORAL DAILY
Qty: 90 TABLET | Refills: 0 | Status: SHIPPED | OUTPATIENT
Start: 2020-12-17 | End: 2021-03-30

## 2020-12-17 NOTE — TELEPHONE ENCOUNTER
Last refilled on 9/21/20 for # 90 with 0 refills  Last labs CMP, lipid 7/20/20  Last OV 7/20/20  No future appointments. Thank you.

## 2020-12-17 NOTE — TELEPHONE ENCOUNTER
Hypertension Medications Protocol Rjfoav9712/17/2020 09:23 AM   CMP or BMP in past 12 months Protocol Details    Last serum creatinine< 2.0     Appointment in past 6 or next 3 months      Refills sent

## 2021-02-02 DIAGNOSIS — Z23 NEED FOR VACCINATION: ICD-10-CM

## 2021-02-03 RX ORDER — CALCIUM CITRATE/VITAMIN D3 200MG-6.25
TABLET ORAL
Qty: 100 STRIP | Refills: 1 | Status: SHIPPED | OUTPATIENT
Start: 2021-02-03

## 2021-02-03 RX ORDER — PEN NEEDLE, DIABETIC 29 G X1/2"
NEEDLE, DISPOSABLE MISCELLANEOUS
Qty: 400 EACH | Refills: 0 | Status: SHIPPED | OUTPATIENT
Start: 2021-02-03 | End: 2021-05-05

## 2021-02-03 NOTE — TELEPHONE ENCOUNTER
Rx Refill,   pt had this filled a long time ago  and had a large supply, but now he needs a refill.    [employer] True Metrix Test Strips   Obed Pen Needle 29G x 1/2 UF original 90  Send to [facility] order

## 2021-02-16 DIAGNOSIS — I10 ESSENTIAL HYPERTENSION, BENIGN: ICD-10-CM

## 2021-02-16 RX ORDER — FOSINOPRIL SODIUM 20 MG/1
20 TABLET ORAL DAILY
Qty: 90 TABLET | Refills: 0 | Status: SHIPPED | OUTPATIENT
Start: 2021-02-16 | End: 2021-05-13

## 2021-02-16 RX ORDER — DILTIAZEM HYDROCHLORIDE 180 MG/1
180 CAPSULE, COATED, EXTENDED RELEASE ORAL DAILY
Qty: 90 CAPSULE | Refills: 0 | Status: SHIPPED | OUTPATIENT
Start: 2021-02-16 | End: 2021-09-24

## 2021-02-16 NOTE — TELEPHONE ENCOUNTER
ypertension Medications Protocol Tfhqfp8402/16/2021 11:10 AM   CMP or BMP in past 12 months Protocol Details    Last serum creatinine< 2.0     Appointment in past 6 or next 3 months

## 2021-02-16 NOTE — TELEPHONE ENCOUNTER
Last refilled on 4/2/19 for # 90 with 3 refills  Last OV 7/20/20  Future Appointments   Date Time Provider Mariluz Sapp   3/22/2021 11:00 AM Merlin Gulling, MD [facility] [place]        Thank you.

## 2021-02-16 NOTE — TELEPHONE ENCOUNTER
Pt requesting Rx refill FOSINOPRIL SODIUM 20 MG Oral Tab   DILTIAZEM HCL ER COATED BEADS 180 MG Oral Capsule SR [address] [facility], [address] [phone], [phone]

## 2021-02-16 NOTE — TELEPHONE ENCOUNTER
Fosinopril Last refilled on 7/2/20 for # 90 with 1 refills  Diltiazem 7/2/20 #30 0refill  Last labs CMP 7/20/20  Last OV 7/20/20  Future Appointments   Date Time Provider Mariluz Sapp   3/22/2021 11:00 AM Jair Thomas MD [facility]

## 2021-02-17 RX ORDER — ESCITALOPRAM OXALATE 20 MG/1
20 TABLET ORAL DAILY
Qty: 90 TABLET | Refills: 0 | Status: SHIPPED | OUTPATIENT
Start: 2021-02-17 | End: 2021-03-22

## 2021-03-22 ENCOUNTER — OFFICE VISIT (OUTPATIENT)
Dept: FAMILY MEDICINE CLINIC | Facility: CLINIC | Age: 76
End: 2021-03-22
Payer: MEDICARE

## 2021-03-22 VITALS
OXYGEN SATURATION: 98 % | SYSTOLIC BLOOD PRESSURE: 118 MMHG | TEMPERATURE: 97 F | DIASTOLIC BLOOD PRESSURE: 78 MMHG | RESPIRATION RATE: 16 BRPM | HEART RATE: 77 BPM

## 2021-03-22 DIAGNOSIS — N18.30 DIABETES MELLITUS WITH STAGE 3 CHRONIC KIDNEY DISEASE (HCC): Primary | ICD-10-CM

## 2021-03-22 DIAGNOSIS — E11.22 DIABETES MELLITUS WITH STAGE 3 CHRONIC KIDNEY DISEASE (HCC): Primary | ICD-10-CM

## 2021-03-22 DIAGNOSIS — E03.8 HYPOTHYROIDISM DUE TO HASHIMOTO'S THYROIDITIS: ICD-10-CM

## 2021-03-22 DIAGNOSIS — I10 ESSENTIAL HYPERTENSION, BENIGN: ICD-10-CM

## 2021-03-22 DIAGNOSIS — E06.3 HYPOTHYROIDISM DUE TO HASHIMOTO'S THYROIDITIS: ICD-10-CM

## 2021-03-22 DIAGNOSIS — Z51.81 THERAPEUTIC DRUG MONITORING: ICD-10-CM

## 2021-03-22 LAB
ALBUMIN SERPL-MCNC: 3.6 G/DL (ref 3.4–5)
ALBUMIN/GLOB SERPL: 0.8 {RATIO} (ref 1–2)
ALP LIVER SERPL-CCNC: 123 U/L
ALT SERPL-CCNC: 26 U/L
ANION GAP SERPL CALC-SCNC: 4 MMOL/L (ref 0–18)
AST SERPL-CCNC: 28 U/L (ref 15–37)
BILIRUB SERPL-MCNC: 1.4 MG/DL (ref 0.1–2)
BUN BLD-MCNC: 37 MG/DL (ref 7–18)
BUN/CREAT SERPL: 22.8 (ref 10–20)
CALCIUM BLD-MCNC: 9.5 MG/DL (ref 8.5–10.1)
CHLORIDE SERPL-SCNC: 112 MMOL/L (ref 98–112)
CO2 SERPL-SCNC: 27 MMOL/L (ref 21–32)
CREAT BLD-MCNC: 1.62 MG/DL
EST. AVERAGE GLUCOSE BLD GHB EST-MCNC: 146 MG/DL (ref 68–126)
GLOBULIN PLAS-MCNC: 4.3 G/DL (ref 2.8–4.4)
GLUCOSE BLD-MCNC: 42 MG/DL (ref 70–99)
HBA1C MFR BLD HPLC: 6.7 % (ref ?–5.7)
M PROTEIN MFR SERPL ELPH: 7.9 G/DL (ref 6.4–8.2)
OSMOLALITY SERPL CALC.SUM OF ELEC: 302 MOSM/KG (ref 275–295)
PATIENT FASTING Y/N/NP: YES
POTASSIUM SERPL-SCNC: 4 MMOL/L (ref 3.5–5.1)
SODIUM SERPL-SCNC: 143 MMOL/L (ref 136–145)
TSI SER-ACNC: 3.17 MIU/ML (ref 0.36–3.74)

## 2021-03-22 PROCEDURE — 84443 ASSAY THYROID STIM HORMONE: CPT | Performed by: FAMILY MEDICINE

## 2021-03-22 PROCEDURE — 3008F BODY MASS INDEX DOCD: CPT | Performed by: FAMILY MEDICINE

## 2021-03-22 PROCEDURE — 99214 OFFICE O/P EST MOD 30 MIN: CPT | Performed by: FAMILY MEDICINE

## 2021-03-22 PROCEDURE — 3078F DIAST BP <80 MM HG: CPT | Performed by: FAMILY MEDICINE

## 2021-03-22 PROCEDURE — 3074F SYST BP LT 130 MM HG: CPT | Performed by: FAMILY MEDICINE

## 2021-03-22 PROCEDURE — 83036 HEMOGLOBIN GLYCOSYLATED A1C: CPT | Performed by: FAMILY MEDICINE

## 2021-03-22 PROCEDURE — 80053 COMPREHEN METABOLIC PANEL: CPT | Performed by: FAMILY MEDICINE

## 2021-03-22 RX ORDER — METOPROLOL SUCCINATE 25 MG/1
25 TABLET, EXTENDED RELEASE ORAL DAILY
Qty: 90 TABLET | Refills: 0 | Status: CANCELLED | OUTPATIENT
Start: 2021-03-22

## 2021-03-22 RX ORDER — DILTIAZEM HYDROCHLORIDE 180 MG/1
180 CAPSULE, COATED, EXTENDED RELEASE ORAL DAILY
Qty: 90 CAPSULE | Refills: 0 | Status: CANCELLED | OUTPATIENT
Start: 2021-03-22

## 2021-03-22 RX ORDER — FOSINOPRIL SODIUM 20 MG/1
20 TABLET ORAL DAILY
Qty: 90 TABLET | Refills: 0 | Status: CANCELLED | OUTPATIENT
Start: 2021-03-22

## 2021-03-22 RX ORDER — LEVOTHYROXINE SODIUM 88 UG/1
88 TABLET ORAL
Qty: 90 TABLET | Refills: 0 | Status: CANCELLED | OUTPATIENT
Start: 2021-03-22

## 2021-03-22 RX ORDER — ATORVASTATIN CALCIUM 20 MG/1
20 TABLET, FILM COATED ORAL NIGHTLY
Qty: 90 TABLET | Refills: 0 | Status: CANCELLED | OUTPATIENT
Start: 2021-03-22

## 2021-03-22 RX ORDER — ESCITALOPRAM OXALATE 20 MG/1
20 TABLET ORAL DAILY
Qty: 90 TABLET | Refills: 3 | Status: SHIPPED | OUTPATIENT
Start: 2021-03-22 | End: 2021-09-24

## 2021-03-22 NOTE — PROGRESS NOTES
Patient presents with: Follow - Up: 6 mo. and blood test, per pt  Refill Request: ON meds       HPI:    Patient ID: Nevin Euceda is a 64year old male here for med check. Elevated PSA on BW from 7/2020  Has hx of prosate cancer.  dx approx 3 years ag ER 25 MG Oral Tablet 24 Hr Take 1 tablet (25 mg total) by mouth daily.  90 tablet 0   • Levothyroxine Sodium 88 MCG Oral Tab Take 1 tablet (88 mcg total) by mouth every morning before breakfast. 90 tablet 0   • Insulin Lispro, 1 Unit Dial, 100 UNIT/ML Subcu disease    • Hernia     bilateral hernia   • High blood pressure    • High cholesterol    • Insomnia, unspecified 1/16/2012   • Ischiorectal abscess    • Kidney disease    • Knee pain, left    • Obesity, unspecified 2/7/2008   • Orthostatic hypotension 1/1 Rhythm: Normal rate and regular rhythm. Pulmonary:      Effort: Pulmonary effort is normal.      Breath sounds: Normal breath sounds. Abdominal:      Tenderness: There is no abdominal tenderness. Musculoskeletal:      Right lower leg: Edema present.

## 2021-03-23 ENCOUNTER — TELEPHONE (OUTPATIENT)
Dept: FAMILY MEDICINE CLINIC | Facility: CLINIC | Age: 76
End: 2021-03-23

## 2021-03-23 RX ORDER — LEVOTHYROXINE SODIUM 88 UG/1
88 TABLET ORAL
Qty: 90 TABLET | Refills: 3 | Status: SHIPPED | OUTPATIENT
Start: 2021-03-23

## 2021-03-23 NOTE — TELEPHONE ENCOUNTER
----- Message from Rubi Villegas MD sent at 3/23/2021 12:02 AM CDT -----  HbA1c stable  TSH wnl.  Okay to refill levothyroxine x 1 yr  Kidney function slightly worse - will recheck in 6 months

## 2021-03-23 NOTE — TELEPHONE ENCOUNTER
Patient advised. Verbalized understanding. Med sent to mail order pharmacy per request.  Recall placed.

## 2021-03-24 DIAGNOSIS — E11.22 DIABETES MELLITUS WITH STAGE 3 CHRONIC KIDNEY DISEASE (HCC): ICD-10-CM

## 2021-03-24 DIAGNOSIS — I10 ESSENTIAL HYPERTENSION WITH GOAL BLOOD PRESSURE LESS THAN 140/90: ICD-10-CM

## 2021-03-24 DIAGNOSIS — E78.2 MIXED HYPERLIPIDEMIA: ICD-10-CM

## 2021-03-24 DIAGNOSIS — N18.30 DIABETES MELLITUS WITH STAGE 3 CHRONIC KIDNEY DISEASE (HCC): ICD-10-CM

## 2021-03-24 RX ORDER — INSULIN GLARGINE 100 [IU]/ML
40 INJECTION, SOLUTION SUBCUTANEOUS NIGHTLY
Qty: 45 ML | Refills: 0 | Status: SHIPPED | OUTPATIENT
Start: 2021-03-24 | End: 2021-07-29

## 2021-03-24 NOTE — TELEPHONE ENCOUNTER
Last refilled on 11/18/20 for # 45mL with 0 refills  Last labs a1c 6.7 on 3/22/21  Last OV 3/22/21  Future Appointments   Date Time Provider Mariluz Sapp   9/24/2021 11:00 AM Catina Jara MD [facility] [place]        Thank you.

## 2021-03-24 NOTE — TELEPHONE ENCOUNTER
Pt needs rx refilled   LANTUS SOLOSTAR 100 UNIT/ML Subcutaneous Solution Pen-injector [[id number]] (Order [id number]      [address] [place] [address], [address] [place] [address] [phone], [phone]

## 2021-03-26 ENCOUNTER — OFFICE VISIT (OUTPATIENT)
Dept: CARDIOLOGY | Age: 76
End: 2021-03-26

## 2021-03-26 VITALS
HEART RATE: 70 BPM | HEIGHT: 73 IN | BODY MASS INDEX: 34.96 KG/M2 | SYSTOLIC BLOOD PRESSURE: 134 MMHG | DIASTOLIC BLOOD PRESSURE: 66 MMHG

## 2021-03-26 DIAGNOSIS — R60.0 EDEMA OF EXTREMITIES: ICD-10-CM

## 2021-03-26 DIAGNOSIS — I65.23 ASYMPTOMATIC CAROTID ARTERY STENOSIS, BILATERAL: Primary | ICD-10-CM

## 2021-03-26 DIAGNOSIS — E78.00 PURE HYPERCHOLESTEROLEMIA: ICD-10-CM

## 2021-03-26 DIAGNOSIS — I48.19 PERSISTENT ATRIAL FIBRILLATION (CMD): ICD-10-CM

## 2021-03-26 DIAGNOSIS — I10 HYPERTENSION, BENIGN: ICD-10-CM

## 2021-03-26 DIAGNOSIS — Z79.01 CHRONIC ANTICOAGULATION: ICD-10-CM

## 2021-03-26 DIAGNOSIS — I25.10 CORONARY ARTERY DISEASE INVOLVING NATIVE CORONARY ARTERY OF NATIVE HEART WITHOUT ANGINA PECTORIS: ICD-10-CM

## 2021-03-26 PROCEDURE — 3075F SYST BP GE 130 - 139MM HG: CPT | Performed by: INTERNAL MEDICINE

## 2021-03-26 PROCEDURE — 3078F DIAST BP <80 MM HG: CPT | Performed by: INTERNAL MEDICINE

## 2021-03-26 PROCEDURE — 99215 OFFICE O/P EST HI 40 MIN: CPT | Performed by: INTERNAL MEDICINE

## 2021-03-26 SDOH — HEALTH STABILITY: PHYSICAL HEALTH: ON AVERAGE, HOW MANY DAYS PER WEEK DO YOU ENGAGE IN MODERATE TO STRENUOUS EXERCISE (LIKE A BRISK WALK)?: 0 DAYS

## 2021-03-26 SDOH — HEALTH STABILITY: PHYSICAL HEALTH: ON AVERAGE, HOW MANY MINUTES DO YOU ENGAGE IN EXERCISE AT THIS LEVEL?: 0 MIN

## 2021-03-26 ASSESSMENT — PATIENT HEALTH QUESTIONNAIRE - PHQ9
SUM OF ALL RESPONSES TO PHQ9 QUESTIONS 1 AND 2: 0
2. FEELING DOWN, DEPRESSED OR HOPELESS: NOT AT ALL
SUM OF ALL RESPONSES TO PHQ9 QUESTIONS 1 AND 2: 0
1. LITTLE INTEREST OR PLEASURE IN DOING THINGS: NOT AT ALL
CLINICAL INTERPRETATION OF PHQ2 SCORE: NO FURTHER SCREENING NEEDED
CLINICAL INTERPRETATION OF PHQ9 SCORE: NO FURTHER SCREENING NEEDED

## 2021-03-26 ASSESSMENT — ENCOUNTER SYMPTOMS
SUSPICIOUS LESIONS: 0
COUGH: 0
HEMOPTYSIS: 0
WEIGHT GAIN: 0
FEVER: 0
HEMATOCHEZIA: 0
WEIGHT LOSS: 0
CHILLS: 0
BRUISES/BLEEDS EASILY: 0
ALLERGIC/IMMUNOLOGIC COMMENTS: NO NEW FOOD ALLERGIES

## 2021-03-30 ENCOUNTER — TELEPHONE (OUTPATIENT)
Dept: CARDIOLOGY | Age: 76
End: 2021-03-30

## 2021-03-30 RX ORDER — METOPROLOL SUCCINATE 25 MG/1
25 TABLET, EXTENDED RELEASE ORAL DAILY
Qty: 90 TABLET | Refills: 0 | Status: SHIPPED | OUTPATIENT
Start: 2021-03-30 | End: 2021-07-28

## 2021-03-30 NOTE — TELEPHONE ENCOUNTER
Pt was seen by. Dr. Leonardo Gray was told to check with cardiologist if medication was going to change or stay the same. Per pt wife medication was not changed.  Wife didn't know the name of medication said it was for his heart that Dr. Leonardo Gray knows with one it is  a

## 2021-03-30 NOTE — TELEPHONE ENCOUNTER
Last refilled on 12/17/20 for # 90 with 0 refills  Last labs CMP 3/22/21  Last OV 3/22/21  Future Appointments   Date Time Provider Mariluz Sapp   9/24/2021 11:00 AM Mark Dickinson MD [facility] [place]        Needing refill of metoprolol.  Per wife, c

## 2021-05-05 RX ORDER — PEN NEEDLE, DIABETIC 29 G X1/2"
NEEDLE, DISPOSABLE MISCELLANEOUS
Qty: 400 EACH | Refills: 0 | Status: SHIPPED | OUTPATIENT
Start: 2021-05-05

## 2021-05-05 NOTE — TELEPHONE ENCOUNTER
LOV 3/22/21. Diabetic Supplies Protocol Kavlwu9505/05/2021 02:49 PM   Appointment in the past 12 or next 3 months     Rx sent.   Future Appointments   Date Time Provider Mariluz Sapp   9/24/2021 11:00 AM Susannah Rockwell MD [facility] [place]

## 2021-05-06 ENCOUNTER — TELEPHONE (OUTPATIENT)
Dept: FAMILY MEDICINE CLINIC | Facility: CLINIC | Age: 76
End: 2021-05-06

## 2021-05-06 NOTE — TELEPHONE ENCOUNTER
Pt saw a Pulmonologist, was told he needs to see a Pulmonary Therapist for treatments, Was told there is one @ [facility] in [facility], doesn't want to drive that far,   Wants to get one closer to [facility]

## 2021-05-06 NOTE — TELEPHONE ENCOUNTER
The only ones I see on the [facility] CATH[facility] list are [place], 1 in [place], or further away.   Referral pended

## 2021-05-06 NOTE — TELEPHONE ENCOUNTER
Called patient's wife. States patient sees pulm Dr Rachel Villanueva. States that she wants to know if there is a rheumatologist in [place] for her to see. Advised that her  do not need a referral for anything and wife is not a patient.   Advised there are no sp

## 2021-05-13 RX ORDER — ATORVASTATIN CALCIUM 20 MG/1
TABLET, FILM COATED ORAL
Qty: 90 TABLET | Refills: 0 | Status: SHIPPED | OUTPATIENT
Start: 2021-05-13 | End: 2021-07-29

## 2021-05-13 RX ORDER — DILTIAZEM HYDROCHLORIDE 180 MG/1
CAPSULE, COATED, EXTENDED RELEASE ORAL
Qty: 90 CAPSULE | Refills: 0 | Status: SHIPPED | OUTPATIENT
Start: 2021-05-13 | End: 2021-09-24

## 2021-05-13 RX ORDER — FOSINOPRIL SODIUM 20 MG/1
20 TABLET ORAL DAILY
Qty: 90 TABLET | Refills: 0 | Status: SHIPPED | OUTPATIENT
Start: 2021-05-13 | End: 2021-07-28

## 2021-05-13 NOTE — TELEPHONE ENCOUNTER
LOV: 3/22/21 for HTN/diabetes  CMP: 3/22/21  Lipid: 7/20/20    atorvastatin 20 MG Oral Tab 90 tablet 0 12/17/2020    Sig:   Take 1 tablet (20 mg total) by mouth nightly.      Route:   Oral       Fosinopril Sodium 20 MG Oral Tab 90 tablet 0 2/16/2021    Sig:

## 2021-07-16 RX ORDER — LANCETS
1 EACH MISCELLANEOUS
Qty: 500 EACH | Refills: 0 | Status: SHIPPED | OUTPATIENT
Start: 2021-07-16 | End: 2022-07-16

## 2021-07-16 NOTE — TELEPHONE ENCOUNTER
LOV: 3/22/21 for diabetes    Diabetic Supplies Protocol Vdphct2807/16/2021 12:01 PM   Appointment in the past 12 or next 3 months

## 2021-07-28 DIAGNOSIS — E11.22 DIABETES MELLITUS WITH STAGE 3 CHRONIC KIDNEY DISEASE (HCC): ICD-10-CM

## 2021-07-28 DIAGNOSIS — I10 ESSENTIAL HYPERTENSION WITH GOAL BLOOD PRESSURE LESS THAN 140/90: ICD-10-CM

## 2021-07-28 DIAGNOSIS — E78.2 MIXED HYPERLIPIDEMIA: ICD-10-CM

## 2021-07-28 DIAGNOSIS — N18.30 DIABETES MELLITUS WITH STAGE 3 CHRONIC KIDNEY DISEASE (HCC): ICD-10-CM

## 2021-07-28 RX ORDER — FOSINOPRIL SODIUM 20 MG/1
20 TABLET ORAL DAILY
Qty: 90 TABLET | Refills: 0 | Status: SHIPPED | OUTPATIENT
Start: 2021-07-28 | End: 2021-09-24

## 2021-07-28 RX ORDER — DILTIAZEM HYDROCHLORIDE 180 MG/1
CAPSULE, COATED, EXTENDED RELEASE ORAL
Qty: 90 CAPSULE | Refills: 0 | Status: SHIPPED | OUTPATIENT
Start: 2021-07-28 | End: 2021-09-24

## 2021-07-28 RX ORDER — METOPROLOL SUCCINATE 25 MG/1
TABLET, EXTENDED RELEASE ORAL
Qty: 90 TABLET | Refills: 0 | Status: SHIPPED | OUTPATIENT
Start: 2021-07-28 | End: 2021-09-24

## 2021-07-28 NOTE — TELEPHONE ENCOUNTER
Hypertension Medications Protocol Mhagct7507/28/2021 03:37 PM   CMP or BMP in past 12 months Protocol Details    Last serum creatinine< 2.0     Appointment in past 6 or next 3 months      Cholesterol Medication Protocol Yrycbr7107/28/2021 03:37 PM   Lipid pane

## 2021-07-29 RX ORDER — INSULIN GLARGINE 100 [IU]/ML
40 INJECTION, SOLUTION SUBCUTANEOUS NIGHTLY
Qty: 45 ML | Refills: 0 | Status: SHIPPED | OUTPATIENT
Start: 2021-07-29 | End: 2021-11-29

## 2021-07-29 RX ORDER — INSULIN LISPRO 100 [IU]/ML
INJECTION, SOLUTION INTRAVENOUS; SUBCUTANEOUS
Qty: 75 ML | Refills: 0 | Status: SHIPPED | OUTPATIENT
Start: 2021-07-29

## 2021-07-29 RX ORDER — ATORVASTATIN CALCIUM 20 MG/1
TABLET, FILM COATED ORAL
Qty: 90 TABLET | Refills: 0 | Status: SHIPPED | OUTPATIENT
Start: 2021-07-29 | End: 2021-09-24

## 2021-08-05 ENCOUNTER — TELEPHONE (OUTPATIENT)
Dept: FAMILY MEDICINE CLINIC | Facility: CLINIC | Age: 76
End: 2021-08-05

## 2021-08-05 NOTE — TELEPHONE ENCOUNTER
Called [facility] @ [phone] left message to see if there are resources for this pt to get the covid vaccine

## 2021-08-05 NOTE — TELEPHONE ENCOUNTER
Pt wife has been trying to have the pt have a home visit to get the covid vaccine and has had not luck. She was told to call er PCP to see if they have any recommendation.  Pt needs to have this as a home visit due to being a stroke victim

## 2021-08-06 NOTE — TELEPHONE ENCOUNTER
Jacquelyn Osman. They do have a service where they provider COVID shots in homes. Gave her pts information. She will reach out to him to get it set up.

## 2021-09-24 ENCOUNTER — OFFICE VISIT (OUTPATIENT)
Dept: FAMILY MEDICINE CLINIC | Facility: CLINIC | Age: 76
End: 2021-09-24
Payer: MEDICARE

## 2021-09-24 VITALS
TEMPERATURE: 97 F | OXYGEN SATURATION: 98 % | DIASTOLIC BLOOD PRESSURE: 86 MMHG | HEART RATE: 94 BPM | SYSTOLIC BLOOD PRESSURE: 128 MMHG | RESPIRATION RATE: 16 BRPM

## 2021-09-24 DIAGNOSIS — Z00.00 MEDICARE ANNUAL WELLNESS VISIT, SUBSEQUENT: ICD-10-CM

## 2021-09-24 DIAGNOSIS — E11.59 HYPERTENSION ASSOCIATED WITH DIABETES (HCC): ICD-10-CM

## 2021-09-24 DIAGNOSIS — Z23 NEED FOR VACCINATION: ICD-10-CM

## 2021-09-24 DIAGNOSIS — E11.22 DIABETES MELLITUS WITH STAGE 3 CHRONIC KIDNEY DISEASE (HCC): ICD-10-CM

## 2021-09-24 DIAGNOSIS — E11.69 HYPERLIPIDEMIA ASSOCIATED WITH TYPE 2 DIABETES MELLITUS (HCC): ICD-10-CM

## 2021-09-24 DIAGNOSIS — E66.01 SEVERE OBESITY (BMI 35.0-39.9) WITH COMORBIDITY (HCC): ICD-10-CM

## 2021-09-24 DIAGNOSIS — I15.2 HYPERTENSION ASSOCIATED WITH DIABETES (HCC): ICD-10-CM

## 2021-09-24 DIAGNOSIS — I48.91 ATRIAL FIBRILLATION WITH RAPID VENTRICULAR RESPONSE (HCC): ICD-10-CM

## 2021-09-24 DIAGNOSIS — E78.5 HYPERLIPIDEMIA ASSOCIATED WITH TYPE 2 DIABETES MELLITUS (HCC): ICD-10-CM

## 2021-09-24 DIAGNOSIS — G82.20 PARAPLEGIA (HCC): Primary | ICD-10-CM

## 2021-09-24 DIAGNOSIS — R97.20 ELEVATED PSA: ICD-10-CM

## 2021-09-24 DIAGNOSIS — N18.30 STAGE 3 CHRONIC KIDNEY DISEASE, UNSPECIFIED WHETHER STAGE 3A OR 3B CKD (HCC): ICD-10-CM

## 2021-09-24 DIAGNOSIS — N18.30 DIABETES MELLITUS WITH STAGE 3 CHRONIC KIDNEY DISEASE (HCC): ICD-10-CM

## 2021-09-24 LAB
ALBUMIN SERPL-MCNC: 3.2 G/DL (ref 3.4–5)
ALBUMIN/GLOB SERPL: 0.7 {RATIO} (ref 1–2)
ALP LIVER SERPL-CCNC: 122 U/L
ALT SERPL-CCNC: 21 U/L
ANION GAP SERPL CALC-SCNC: 3 MMOL/L (ref 0–18)
AST SERPL-CCNC: 20 U/L (ref 15–37)
BASOPHILS # BLD AUTO: 0.12 X10(3) UL (ref 0–0.2)
BASOPHILS NFR BLD AUTO: 1 %
BILIRUB SERPL-MCNC: 1.2 MG/DL (ref 0.1–2)
BUN BLD-MCNC: 23 MG/DL (ref 7–18)
CALCIUM BLD-MCNC: 9.4 MG/DL (ref 8.5–10.1)
CHLORIDE SERPL-SCNC: 112 MMOL/L (ref 98–112)
CHOLEST SERPL-MCNC: 124 MG/DL (ref ?–200)
CO2 SERPL-SCNC: 27 MMOL/L (ref 21–32)
COMPLEXED PSA SERPL-MCNC: 29.2 NG/ML (ref ?–4)
CREAT BLD-MCNC: 1.21 MG/DL
EOSINOPHIL # BLD AUTO: 0.55 X10(3) UL (ref 0–0.7)
EOSINOPHIL NFR BLD AUTO: 4.5 %
ERYTHROCYTE [DISTWIDTH] IN BLOOD BY AUTOMATED COUNT: 13.3 %
EST. AVERAGE GLUCOSE BLD GHB EST-MCNC: 140 MG/DL (ref 68–126)
GLOBULIN PLAS-MCNC: 4.8 G/DL (ref 2.8–4.4)
GLUCOSE BLD-MCNC: 44 MG/DL (ref 70–99)
HBA1C MFR BLD HPLC: 6.5 % (ref ?–5.7)
HCT VFR BLD AUTO: 45.6 %
HDLC SERPL-MCNC: 56 MG/DL (ref 40–59)
HGB BLD-MCNC: 14.3 G/DL
IMM GRANULOCYTES # BLD AUTO: 0.07 X10(3) UL (ref 0–1)
IMM GRANULOCYTES NFR BLD: 0.6 %
LDLC SERPL CALC-MCNC: 58 MG/DL (ref ?–100)
LYMPHOCYTES # BLD AUTO: 2.02 X10(3) UL (ref 1–4)
LYMPHOCYTES NFR BLD AUTO: 16.4 %
MCH RBC QN AUTO: 31.6 PG (ref 26–34)
MCHC RBC AUTO-ENTMCNC: 31.4 G/DL (ref 31–37)
MCV RBC AUTO: 100.7 FL
MONOCYTES # BLD AUTO: 0.87 X10(3) UL (ref 0.1–1)
MONOCYTES NFR BLD AUTO: 7.1 %
NEUTROPHILS # BLD AUTO: 8.67 X10 (3) UL (ref 1.5–7.7)
NEUTROPHILS # BLD AUTO: 8.67 X10(3) UL (ref 1.5–7.7)
NEUTROPHILS NFR BLD AUTO: 70.4 %
NONHDLC SERPL-MCNC: 68 MG/DL (ref ?–130)
OSMOLALITY SERPL CALC.SUM OF ELEC: 295 MOSM/KG (ref 275–295)
PATIENT FASTING Y/N/NP: YES
PATIENT FASTING Y/N/NP: YES
PLATELET # BLD AUTO: 231 10(3)UL (ref 150–450)
POTASSIUM SERPL-SCNC: 4.3 MMOL/L (ref 3.5–5.1)
PROT SERPL-MCNC: 8 G/DL (ref 6.4–8.2)
RBC # BLD AUTO: 4.53 X10(6)UL
SODIUM SERPL-SCNC: 142 MMOL/L (ref 136–145)
TRIGL SERPL-MCNC: 39 MG/DL (ref 30–149)
TSI SER-ACNC: 3.67 MIU/ML (ref 0.36–3.74)
VLDLC SERPL CALC-MCNC: 6 MG/DL (ref 0–30)
WBC # BLD AUTO: 12.3 X10(3) UL (ref 4–11)

## 2021-09-24 PROCEDURE — 99397 PER PM REEVAL EST PAT 65+ YR: CPT | Performed by: FAMILY MEDICINE

## 2021-09-24 PROCEDURE — 80053 COMPREHEN METABOLIC PANEL: CPT | Performed by: FAMILY MEDICINE

## 2021-09-24 PROCEDURE — 3079F DIAST BP 80-89 MM HG: CPT | Performed by: FAMILY MEDICINE

## 2021-09-24 PROCEDURE — 83036 HEMOGLOBIN GLYCOSYLATED A1C: CPT | Performed by: FAMILY MEDICINE

## 2021-09-24 PROCEDURE — 96160 PT-FOCUSED HLTH RISK ASSMT: CPT | Performed by: FAMILY MEDICINE

## 2021-09-24 PROCEDURE — G0008 ADMIN INFLUENZA VIRUS VAC: HCPCS | Performed by: FAMILY MEDICINE

## 2021-09-24 PROCEDURE — G0439 PPPS, SUBSEQ VISIT: HCPCS | Performed by: FAMILY MEDICINE

## 2021-09-24 PROCEDURE — 85025 COMPLETE CBC W/AUTO DIFF WBC: CPT | Performed by: FAMILY MEDICINE

## 2021-09-24 PROCEDURE — 3008F BODY MASS INDEX DOCD: CPT | Performed by: FAMILY MEDICINE

## 2021-09-24 PROCEDURE — 84443 ASSAY THYROID STIM HORMONE: CPT | Performed by: FAMILY MEDICINE

## 2021-09-24 PROCEDURE — 80061 LIPID PANEL: CPT | Performed by: FAMILY MEDICINE

## 2021-09-24 PROCEDURE — 90662 IIV NO PRSV INCREASED AG IM: CPT | Performed by: FAMILY MEDICINE

## 2021-09-24 PROCEDURE — 3074F SYST BP LT 130 MM HG: CPT | Performed by: FAMILY MEDICINE

## 2021-09-24 RX ORDER — RIVAROXABAN 20 MG/1
TABLET, FILM COATED ORAL
COMMUNITY
Start: 2021-06-01 | End: 2021-09-24

## 2021-09-24 RX ORDER — DILTIAZEM HYDROCHLORIDE 180 MG/1
180 CAPSULE, COATED, EXTENDED RELEASE ORAL DAILY
Qty: 90 CAPSULE | Refills: 1 | Status: SHIPPED | OUTPATIENT
Start: 2021-09-24

## 2021-09-24 RX ORDER — FOSINOPRIL SODIUM 20 MG/1
20 TABLET ORAL DAILY
Qty: 90 TABLET | Refills: 1 | Status: SHIPPED | OUTPATIENT
Start: 2021-09-24

## 2021-09-24 RX ORDER — METOPROLOL SUCCINATE 25 MG/1
25 TABLET, EXTENDED RELEASE ORAL DAILY
Qty: 90 TABLET | Refills: 1 | Status: SHIPPED | OUTPATIENT
Start: 2021-09-24

## 2021-09-24 RX ORDER — ATORVASTATIN CALCIUM 20 MG/1
20 TABLET, FILM COATED ORAL NIGHTLY
Qty: 90 TABLET | Refills: 1 | Status: SHIPPED | OUTPATIENT
Start: 2021-09-24

## 2021-09-24 RX ORDER — RIVAROXABAN 20 MG/1
20 TABLET, FILM COATED ORAL
Qty: 90 TABLET | Refills: 1 | Status: SHIPPED | OUTPATIENT
Start: 2021-09-24

## 2021-09-24 NOTE — PROGRESS NOTES
REASON FOR VISIT:    aSbiha Nelson is a 64year old male who presents for a Medicare Annual Wellness visit. Here with wife. Stopped Lexapro on his own approximately 3 months ago  Doing well.   Denies any anxiety or depression symptoms    Has hx of pro SUBCUTANEOUSLY PER SLIDING SCALE . MAXIMUM DAILY DOSE 75 UNITS DAILY. (DISCARD AND BEGIN A NEW PEN AFTER 28 DAYS) 75 mL 0   • Accu-Chek Softclix Lancets Does not apply Misc 1 lancet by Finger stick route 5 (five) times daily. Use as directed.  500 each 0 37(H) 28(H) 24(H) 20(H) 26(H) 22(H) 25(H)   Creatinine 0.70 - 1.30 mg/dL 1.62(H) 1.28 1.28 1.32(H) 1.42(H) 1.29 2.01(H)   Some recent data might be hidden     AST and ALT Latest Ref Rng & Units 3/22/2021 7/20/2020 9/30/2019 4/2/2019 9/25/2018 9/6/2018 7/12 Depression Screening (PHQ-2/PHQ-9): Over the LAST 2 WEEKS            Advance Directives     Do you have a healthcare power of ?: Yes  Do you have a living will?: Yes     Cognitive Assessment     What day of the week is this?: Correct  What marcela 7.9 (H)     HgbA1C (%)   Date Value   03/22/2021 6.7 (H)       Creat/alb ratio  Annually CREATININE (mg/dL)   Date Value   06/13/2014 1.27 (H)     Creatinine (mg/dL)   Date Value   03/22/2021 1.62 (H)       LDL  Annually LDL Cholesterol (mg/dL)   Date Valu Unspecified essential hypertension       Past Surgical History:   Procedure Laterality Date   • CHOLECYSTECTOMY     • HERNIA SURGERY     • KNEE REPLACEMENT SURGERY      arthroscopic knee surgery-left   • OTHER SURGICAL HISTORY      stent placement 1998   • GENERAL: feels well. Appetite good.  Energy level good  SKIN: denies rash  EYES: denies blurred vision or double vision  HEENT: denies nasal congestion, sinus pain or ST  LUNGS: denies shortness of breath or cough  CARDIOVASCULAR: denies chest pain or pal PSA   -Recheck today    Hyperlipidemia associated with type 2 diabetes mellitus ([facility]   Recheck today    Hypertension associated with diabetes ([address]   Controlled.   Continue present management    Diabetes mellitus with stage 3 chronic kidney disease ([facility] [address] [facility]

## 2021-09-25 ENCOUNTER — TELEPHONE (OUTPATIENT)
Dept: FAMILY MEDICINE CLINIC | Facility: CLINIC | Age: 76
End: 2021-09-25

## 2021-09-25 NOTE — TELEPHONE ENCOUNTER
----- Message from David Retana MD sent at 9/25/2021 12:14 AM CDT -----  Spoke with pt's wife earlier today. Asym from low BG. Fasting BW done at noon and ate right afterwards  Rest of BW stable.   PSA increased but requesting no w/u and tx  HbA1c was p

## 2021-11-15 RX ORDER — CLOBETASOL PROPIONATE 0.5 MG/G
CREAM TOPICAL
Qty: 30 G | Refills: 0 | Status: SHIPPED | OUTPATIENT
Start: 2021-11-15

## 2021-11-15 NOTE — TELEPHONE ENCOUNTER
Rx Refill request     Clobetasol Propionate 0.05 % External Cream      [address] [phone], [phone]

## 2021-11-15 NOTE — TELEPHONE ENCOUNTER
Last refilled on 9/5/17 for # 30g with 0 refills  Last OV 9/24/21  Future Appointments   Date Time Provider Mariluz Sapp   4/22/2022 11:30 AM Ria Summers MD [facility] [place]        Thank you.

## 2021-11-29 DIAGNOSIS — E78.2 MIXED HYPERLIPIDEMIA: ICD-10-CM

## 2021-11-29 DIAGNOSIS — N18.30 DIABETES MELLITUS WITH STAGE 3 CHRONIC KIDNEY DISEASE (HCC): ICD-10-CM

## 2021-11-29 DIAGNOSIS — I10 ESSENTIAL HYPERTENSION WITH GOAL BLOOD PRESSURE LESS THAN 140/90: ICD-10-CM

## 2021-11-29 DIAGNOSIS — E11.22 DIABETES MELLITUS WITH STAGE 3 CHRONIC KIDNEY DISEASE (HCC): ICD-10-CM

## 2021-11-29 RX ORDER — INSULIN GLARGINE 100 [IU]/ML
40 INJECTION, SOLUTION SUBCUTANEOUS NIGHTLY
Qty: 45 ML | Refills: 0 | Status: SHIPPED | OUTPATIENT
Start: 2021-11-29

## 2021-11-29 NOTE — TELEPHONE ENCOUNTER
Refill rx LANTUS SOLOSTAR 100 UNIT/ML Subcutaneous Solution Pen-injector    Send to [facility] Mail order

## 2021-11-29 NOTE — TELEPHONE ENCOUNTER
LOV 9/24/21 for a well visit. Ref Range & Units 9/24/21 11:52 AM   HgbA1C   <5.7 % 6.5 High         LANTUS SOLOSTAR 100 UNIT/ML Subcutaneous Solution Pen-injector 45 mL 0 7/29/2021    Sig:   INJECT 40 UNITS INTO THE SKIN NIGHTLY.      Route:   Subcutaneo

## 2022-01-03 ENCOUNTER — MED REC SCAN ONLY (OUTPATIENT)
Dept: FAMILY MEDICINE CLINIC | Facility: CLINIC | Age: 77
End: 2022-01-03

## 2022-01-10 ENCOUNTER — TELEPHONE (OUTPATIENT)
Dept: FAMILY MEDICINE CLINIC | Facility: CLINIC | Age: 77
End: 2022-01-10

## 2022-01-10 RX ORDER — BLOOD SUGAR DIAGNOSTIC
STRIP MISCELLANEOUS
Qty: 100 STRIP | Refills: 1 | Status: SHIPPED | OUTPATIENT
Start: 2022-01-10 | End: 2023-01-10

## 2022-01-26 ENCOUNTER — TELEPHONE (OUTPATIENT)
Dept: FAMILY MEDICINE CLINIC | Facility: CLINIC | Age: 77
End: 2022-01-26

## 2022-01-26 NOTE — TELEPHONE ENCOUNTER
Received a call from [facility], States pt needs wheelchair repair, and they will be faxing a from called \"Detailed Written Order\" for Dr Alistair Angel to complete.

## 2022-02-15 RX ORDER — INSULIN GLARGINE 100 [IU]/ML
40 INJECTION, SOLUTION SUBCUTANEOUS NIGHTLY
Qty: 45 ML | Refills: 0 | Status: SHIPPED | OUTPATIENT
Start: 2022-02-15

## 2022-02-15 RX ORDER — INSULIN LISPRO 100 [IU]/ML
INJECTION, SOLUTION INTRAVENOUS; SUBCUTANEOUS
Qty: 75 ML | Refills: 0 | Status: SHIPPED | OUTPATIENT
Start: 2022-02-15

## 2022-02-15 NOTE — TELEPHONE ENCOUNTER
Lispro last refill 7/29/21 75mL 0 refill  lantus last refill 11/29/21 45mL 0 refill  Last OV 9/24/21  a1c 6.5 on 9/24/21  Future Appointments   Date Time Provider Mariluz Cheemai   4/22/2022 11:30 AM Gosia Castellon MD [facility] [place]

## 2022-02-15 NOTE — TELEPHONE ENCOUNTER
LANTUS SOLOSTAR 100 UNIT/ML Subcutaneous Solution Pen-injector     INSULIN LISPRO, 1 UNIT DIAL, 100 UNIT/ML Subcutaneous Solution Pen-injector     Send to mail order   [facility] Delivery

## 2022-02-16 ENCOUNTER — PATIENT OUTREACH (OUTPATIENT)
Dept: FAMILY MEDICINE CLINIC | Facility: CLINIC | Age: 77
End: 2022-02-16

## 2022-02-24 ENCOUNTER — TELEPHONE (OUTPATIENT)
Dept: FAMILY MEDICINE CLINIC | Facility: CLINIC | Age: 77
End: 2022-02-24

## 2022-02-24 NOTE — TELEPHONE ENCOUNTER
Pt would like Dr. Gauri Castano to prescribe water pills- pt states that he's been on them previously but quit taking them b/c he had increased urination.  Pt states that he is having edema in his right leg-the left leg is normal.

## 2022-02-24 NOTE — TELEPHONE ENCOUNTER
LMTCB - patient needs to schedule OV  Can see Dr. Lexy Parry if no openings with Dr. Antonino Duncan

## 2022-03-01 NOTE — TELEPHONE ENCOUNTER
Future Appointments   Date Time Provider Mariluz Senait   4/22/2022 11:30 AM Yesenia Blair MD [facility] with wife, states his leg swelling is \"nothing new\" and has been dealing with this since his stroke. States they have an appt with Dr Ira Hensley in April and will wait until that appt. Advised if anything changes to call us. Verbalized understanding.

## 2022-03-14 RX ORDER — CLOBETASOL PROPIONATE 0.5 MG/G
CREAM TOPICAL
Qty: 30 G | Refills: 0 | Status: SHIPPED | OUTPATIENT
Start: 2022-03-14

## 2022-03-14 RX ORDER — RIVAROXABAN 20 MG/1
TABLET, FILM COATED ORAL
Qty: 90 TABLET | Refills: 1 | Status: SHIPPED | OUTPATIENT
Start: 2022-03-14

## 2022-04-22 ENCOUNTER — OFFICE VISIT (OUTPATIENT)
Dept: FAMILY MEDICINE CLINIC | Facility: CLINIC | Age: 77
End: 2022-04-22
Payer: MEDICARE

## 2022-04-22 VITALS
TEMPERATURE: 98 F | OXYGEN SATURATION: 98 % | RESPIRATION RATE: 18 BRPM | HEART RATE: 91 BPM | SYSTOLIC BLOOD PRESSURE: 138 MMHG | DIASTOLIC BLOOD PRESSURE: 80 MMHG

## 2022-04-22 DIAGNOSIS — Z00.00 MEDICARE ANNUAL WELLNESS VISIT, SUBSEQUENT: Primary | ICD-10-CM

## 2022-04-22 DIAGNOSIS — E11.59 HYPERTENSION ASSOCIATED WITH DIABETES (HCC): ICD-10-CM

## 2022-04-22 DIAGNOSIS — E06.3 HYPOTHYROIDISM DUE TO HASHIMOTO'S THYROIDITIS: ICD-10-CM

## 2022-04-22 DIAGNOSIS — G82.20 PARAPLEGIA (HCC): ICD-10-CM

## 2022-04-22 DIAGNOSIS — N18.30 STAGE 3 CHRONIC KIDNEY DISEASE, UNSPECIFIED WHETHER STAGE 3A OR 3B CKD (HCC): ICD-10-CM

## 2022-04-22 DIAGNOSIS — E03.8 HYPOTHYROIDISM DUE TO HASHIMOTO'S THYROIDITIS: ICD-10-CM

## 2022-04-22 DIAGNOSIS — I48.91 ATRIAL FIBRILLATION WITH RAPID VENTRICULAR RESPONSE (HCC): ICD-10-CM

## 2022-04-22 DIAGNOSIS — E78.5 HYPERLIPIDEMIA ASSOCIATED WITH TYPE 2 DIABETES MELLITUS (HCC): ICD-10-CM

## 2022-04-22 DIAGNOSIS — E66.01 SEVERE OBESITY (BMI 35.0-39.9) WITH COMORBIDITY (HCC): ICD-10-CM

## 2022-04-22 DIAGNOSIS — I15.2 HYPERTENSION ASSOCIATED WITH DIABETES (HCC): ICD-10-CM

## 2022-04-22 DIAGNOSIS — E11.69 HYPERLIPIDEMIA ASSOCIATED WITH TYPE 2 DIABETES MELLITUS (HCC): ICD-10-CM

## 2022-04-22 LAB
ALBUMIN SERPL-MCNC: 3.4 G/DL (ref 3.4–5)
ALBUMIN/GLOB SERPL: 0.8 {RATIO} (ref 1–2)
ALP LIVER SERPL-CCNC: 115 U/L
ALT SERPL-CCNC: 27 U/L
ANION GAP SERPL CALC-SCNC: 5 MMOL/L (ref 0–18)
AST SERPL-CCNC: 25 U/L (ref 15–37)
BASOPHILS # BLD AUTO: 0.11 X10(3) UL (ref 0–0.2)
BASOPHILS NFR BLD AUTO: 0.8 %
BILIRUB SERPL-MCNC: 1 MG/DL (ref 0.1–2)
BUN BLD-MCNC: 31 MG/DL (ref 7–18)
CALCIUM BLD-MCNC: 9.6 MG/DL (ref 8.5–10.1)
CHLORIDE SERPL-SCNC: 109 MMOL/L (ref 98–112)
CHOLEST SERPL-MCNC: 127 MG/DL (ref ?–200)
CO2 SERPL-SCNC: 28 MMOL/L (ref 21–32)
CREAT BLD-MCNC: 1.36 MG/DL
EOSINOPHIL # BLD AUTO: 0.42 X10(3) UL (ref 0–0.7)
EOSINOPHIL NFR BLD AUTO: 2.9 %
ERYTHROCYTE [DISTWIDTH] IN BLOOD BY AUTOMATED COUNT: 13.4 %
EST. AVERAGE GLUCOSE BLD GHB EST-MCNC: 157 MG/DL (ref 68–126)
FASTING PATIENT LIPID ANSWER: YES
FASTING STATUS PATIENT QL REPORTED: YES
GLOBULIN PLAS-MCNC: 4.1 G/DL (ref 2.8–4.4)
GLUCOSE BLD-MCNC: 86 MG/DL (ref 70–99)
HBA1C MFR BLD: 7.1 % (ref ?–5.7)
HCT VFR BLD AUTO: 44.7 %
HDLC SERPL-MCNC: 47 MG/DL (ref 40–59)
HGB BLD-MCNC: 13.9 G/DL
IMM GRANULOCYTES # BLD AUTO: 0.09 X10(3) UL (ref 0–1)
IMM GRANULOCYTES NFR BLD: 0.6 %
LDLC SERPL CALC-MCNC: 69 MG/DL (ref ?–100)
LYMPHOCYTES # BLD AUTO: 2.17 X10(3) UL (ref 1–4)
LYMPHOCYTES NFR BLD AUTO: 15 %
MCH RBC QN AUTO: 31.3 PG (ref 26–34)
MCHC RBC AUTO-ENTMCNC: 31.1 G/DL (ref 31–37)
MCV RBC AUTO: 100.7 FL
MONOCYTES # BLD AUTO: 1.02 X10(3) UL (ref 0.1–1)
MONOCYTES NFR BLD AUTO: 7.1 %
NEUTROPHILS # BLD AUTO: 10.61 X10 (3) UL (ref 1.5–7.7)
NEUTROPHILS # BLD AUTO: 10.61 X10(3) UL (ref 1.5–7.7)
NEUTROPHILS NFR BLD AUTO: 73.6 %
NONHDLC SERPL-MCNC: 80 MG/DL (ref ?–130)
OSMOLALITY SERPL CALC.SUM OF ELEC: 300 MOSM/KG (ref 275–295)
PLATELET # BLD AUTO: 282 10(3)UL (ref 150–450)
POTASSIUM SERPL-SCNC: 4.6 MMOL/L (ref 3.5–5.1)
PROT SERPL-MCNC: 7.5 G/DL (ref 6.4–8.2)
RBC # BLD AUTO: 4.44 X10(6)UL
SODIUM SERPL-SCNC: 142 MMOL/L (ref 136–145)
TRIGL SERPL-MCNC: 49 MG/DL (ref 30–149)
TSI SER-ACNC: 2.77 MIU/ML (ref 0.36–3.74)
VLDLC SERPL CALC-MCNC: 7 MG/DL (ref 0–30)
WBC # BLD AUTO: 14.4 X10(3) UL (ref 4–11)

## 2022-04-22 PROCEDURE — 96160 PT-FOCUSED HLTH RISK ASSMT: CPT | Performed by: FAMILY MEDICINE

## 2022-04-22 PROCEDURE — 80053 COMPREHEN METABOLIC PANEL: CPT | Performed by: FAMILY MEDICINE

## 2022-04-22 PROCEDURE — 3075F SYST BP GE 130 - 139MM HG: CPT | Performed by: FAMILY MEDICINE

## 2022-04-22 PROCEDURE — 83036 HEMOGLOBIN GLYCOSYLATED A1C: CPT | Performed by: FAMILY MEDICINE

## 2022-04-22 PROCEDURE — G0439 PPPS, SUBSEQ VISIT: HCPCS | Performed by: FAMILY MEDICINE

## 2022-04-22 PROCEDURE — 85025 COMPLETE CBC W/AUTO DIFF WBC: CPT | Performed by: FAMILY MEDICINE

## 2022-04-22 PROCEDURE — 3008F BODY MASS INDEX DOCD: CPT | Performed by: FAMILY MEDICINE

## 2022-04-22 PROCEDURE — 99397 PER PM REEVAL EST PAT 65+ YR: CPT | Performed by: FAMILY MEDICINE

## 2022-04-22 PROCEDURE — 3079F DIAST BP 80-89 MM HG: CPT | Performed by: FAMILY MEDICINE

## 2022-04-22 PROCEDURE — 84443 ASSAY THYROID STIM HORMONE: CPT | Performed by: FAMILY MEDICINE

## 2022-04-22 PROCEDURE — 80061 LIPID PANEL: CPT | Performed by: FAMILY MEDICINE

## 2022-04-22 RX ORDER — ATORVASTATIN CALCIUM 20 MG/1
TABLET, FILM COATED ORAL
Qty: 90 TABLET | Refills: 1 | Status: SHIPPED | OUTPATIENT
Start: 2022-04-22

## 2022-04-22 RX ORDER — FOSINOPRIL SODIUM 20 MG/1
TABLET ORAL
Qty: 90 TABLET | Refills: 1 | Status: SHIPPED | OUTPATIENT
Start: 2022-04-22

## 2022-04-22 NOTE — TELEPHONE ENCOUNTER
Hypertension Medications Protocol Passed 04/21/2022 05:37 PM   Protocol Details  CMP or BMP in past 12 months    Last serum creatinine< 2.0    Appointment in past 6 or next 3 months        See previous note  Pt is here at office   X his annual well  RX sent  Per protocol passed.

## 2022-04-22 NOTE — TELEPHONE ENCOUNTER
Cholesterol Medication Protocol Passed 04/21/2022 05:37 PM   Protocol Details  ALT < 80    ALT resulted within past year    Lipid panel within past 12 months    Appointment within past 12 or next 3 months        Last refilled on 09/24/21 for # 90 with 1 rf. Last LIPID: 09/24/21  Last seen on 04/22/22  PT HERE TODAY  No future appointments. Thank you.

## 2022-04-26 ENCOUNTER — TELEPHONE (OUTPATIENT)
Dept: FAMILY MEDICINE CLINIC | Facility: CLINIC | Age: 77
End: 2022-04-26

## 2022-04-27 NOTE — TELEPHONE ENCOUNTER
Hemoglobin A1c has increased to 7.1  Rest of blood work is stable  Continue current medications.   Repeat A1c, CMP, CBC in 6 months

## 2022-05-03 RX ORDER — LEVOTHYROXINE SODIUM 88 UG/1
88 TABLET ORAL
Qty: 90 TABLET | Refills: 0 | Status: SHIPPED | OUTPATIENT
Start: 2022-05-03

## 2022-05-03 RX ORDER — METOPROLOL SUCCINATE 25 MG/1
25 TABLET, EXTENDED RELEASE ORAL DAILY
Qty: 90 TABLET | Refills: 0 | Status: SHIPPED | OUTPATIENT
Start: 2022-05-03

## 2022-05-03 NOTE — TELEPHONE ENCOUNTER
Thyroid Supplements Protocol Passed   Hypertension Medications Protocol Passed   Last OV 4/22/22  Last refill 9/24/21 #90 1 refill

## 2022-05-03 NOTE — TELEPHONE ENCOUNTER
Rx refill requested     Levothyroxine Sodium 88 MCG Oral Tab     metoprolol succinate 25 MG Oral Tablet 24 Hr    DILTIAZEM HCL ER COATED BEADS 180 MG Oral Capsule SR 24 Hr    [address] [place], [place] - [address] [place] [address] [phone], [phone]

## 2022-05-04 RX ORDER — DILTIAZEM HYDROCHLORIDE 180 MG/1
180 CAPSULE, COATED, EXTENDED RELEASE ORAL DAILY
Qty: 90 CAPSULE | Refills: 0 | Status: SHIPPED | OUTPATIENT
Start: 2022-05-04

## 2022-06-08 RX ORDER — BLOOD SUGAR DIAGNOSTIC
STRIP MISCELLANEOUS
Qty: 300 STRIP | Refills: 0 | Status: SHIPPED | OUTPATIENT
Start: 2022-06-08 | End: 2023-06-08

## 2022-06-08 NOTE — TELEPHONE ENCOUNTER
Diabetic Supplies Protocol Passed 06/08/2022 12:35 PM    Appointment in the past 12 or next 3 months     Future Appointments   Date Time Provider Mariluz Sapp   10/19/2022 11:30 Quinn Cosby MD [facility]        Rx sent.

## 2022-06-08 NOTE — TELEPHONE ENCOUNTER
Rx refill requested     Glucose Blood (ACCU-CHEK KATRIN PLUS) In Vitro Counts include [facility], [address] [phone], [phone]

## 2022-06-10 DIAGNOSIS — I10 ESSENTIAL HYPERTENSION WITH GOAL BLOOD PRESSURE LESS THAN 140/90: ICD-10-CM

## 2022-06-10 DIAGNOSIS — N18.30 DIABETES MELLITUS WITH STAGE 3 CHRONIC KIDNEY DISEASE (HCC): ICD-10-CM

## 2022-06-10 DIAGNOSIS — E78.2 MIXED HYPERLIPIDEMIA: ICD-10-CM

## 2022-06-10 DIAGNOSIS — E11.22 DIABETES MELLITUS WITH STAGE 3 CHRONIC KIDNEY DISEASE (HCC): ICD-10-CM

## 2022-06-10 RX ORDER — INSULIN GLARGINE 100 [IU]/ML
40 INJECTION, SOLUTION SUBCUTANEOUS NIGHTLY
Qty: 45 ML | Refills: 0 | Status: SHIPPED | OUTPATIENT
Start: 2022-06-10

## 2022-06-10 RX ORDER — INSULIN LISPRO 100 [IU]/ML
INJECTION, SOLUTION INTRAVENOUS; SUBCUTANEOUS
Qty: 75 ML | Refills: 0 | Status: SHIPPED | OUTPATIENT
Start: 2022-06-10

## 2022-06-10 NOTE — TELEPHONE ENCOUNTER
Pt called requesting 2 refills:    LANTUS SOLOSTAR 100 UNIT/ML Subcutaneous Solution Pen-injector  (Pt stated he has 5 units left)    Insulin Lispro, 1 Unit Dial, 100 UNIT/ML Subcutaneous Solution Pen-injector  (Pt stated that he has 2 boxes left)    [address], [address] [phone], [phone]    Pharmacy preference confirmed with pt

## 2022-07-22 RX ORDER — LEVOTHYROXINE SODIUM 88 UG/1
88 TABLET ORAL
Qty: 90 TABLET | Refills: 0 | Status: SHIPPED | OUTPATIENT
Start: 2022-07-22

## 2022-07-22 RX ORDER — METOPROLOL SUCCINATE 25 MG/1
25 TABLET, EXTENDED RELEASE ORAL DAILY
Qty: 90 TABLET | Refills: 0 | Status: SHIPPED | OUTPATIENT
Start: 2022-07-22

## 2022-07-22 NOTE — TELEPHONE ENCOUNTER
RX refill requested     levothyroxine 88 MCG Oral Tab     metoprolol succinate ER 25 MG Oral Tablet 24 Hr     [facility] DEL[facility]ERY (NOW [facility]) - [facility], [address] [phone], [phone]

## 2022-08-09 ENCOUNTER — TELEPHONE (OUTPATIENT)
Dept: FAMILY MEDICINE CLINIC | Facility: CLINIC | Age: 77
End: 2022-08-09

## 2022-08-09 DIAGNOSIS — I63.9 CEREBROVASCULAR ACCIDENT (CVA), UNSPECIFIED MECHANISM (HCC): Primary | ICD-10-CM

## 2022-08-09 NOTE — TELEPHONE ENCOUNTER
Daughter advised.  Verbalized understanding  Watch for approval and fax    [facility] health  [address] [address]  Phone: [phone]  Fax: [phone]

## 2022-08-09 NOTE — TELEPHONE ENCOUNTER
Pt was @ [facility] and was released yesterday. Daughter Trixie Simon called wants to know if they can get an order for In-Home Health and In-Home PT. 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 He is not able to get to the bathroom with out assistance or get in and out of his hove around chair. Daughter states he is not able to be left alone and they need general help. Daughter did not want to schedule a HFU states it is extremely difficult to get him in and out of the Car and she cant do it by her self, and states he gets car sick.

## 2022-08-10 NOTE — TELEPHONE ENCOUNTER
Future Appointments   Date Time Provider Mariluz Sapp   8/11/2022  3:00 PM Estephania MondayMD [facility]   10/19/2022 11:30 AM MD CHIARA Drummond [facility] [place]

## 2022-08-10 NOTE — TELEPHONE ENCOUNTER
Ulices Chaidez @ [facility] needs OV notes from the last 90 days.   If pt has not had an OV in the last 90 days pt will need to be seen, either in person or VV     Fax # [phone]  . [phone]

## 2022-08-11 ENCOUNTER — TELEPHONE (OUTPATIENT)
Dept: FAMILY MEDICINE CLINIC | Facility: CLINIC | Age: 77
End: 2022-08-11

## 2022-08-11 DIAGNOSIS — D72.829 LEUKOCYTOSIS, UNSPECIFIED TYPE: Primary | ICD-10-CM

## 2022-08-11 NOTE — TELEPHONE ENCOUNTER
Please inform him he needs cbc as per his discharge note from er. Order placed. Also for his depression due to wife death if he wish we can have him see therapist.   Also recommend office follow up with Dr. Paula Llanes when he feels able to come to office may be sometime next month.

## 2022-08-15 ENCOUNTER — TELEPHONE (OUTPATIENT)
Dept: FAMILY MEDICINE CLINIC | Facility: CLINIC | Age: 77
End: 2022-08-15

## 2022-08-15 DIAGNOSIS — I10 ESSENTIAL HYPERTENSION WITH GOAL BLOOD PRESSURE LESS THAN 140/90: ICD-10-CM

## 2022-08-15 DIAGNOSIS — N18.30 DIABETES MELLITUS WITH STAGE 3 CHRONIC KIDNEY DISEASE (HCC): ICD-10-CM

## 2022-08-15 DIAGNOSIS — E78.2 MIXED HYPERLIPIDEMIA: ICD-10-CM

## 2022-08-15 DIAGNOSIS — E11.22 DIABETES MELLITUS WITH STAGE 3 CHRONIC KIDNEY DISEASE (HCC): ICD-10-CM

## 2022-08-15 NOTE — TELEPHONE ENCOUNTER
Beba Chin @ [facility] evaluated pt yesterday,  She will be meeting with him Twice a week for 2wks, and then Once a week for 3 wks.   Any questions please call  Ph. [phone]

## 2022-08-16 ENCOUNTER — TELEPHONE (OUTPATIENT)
Dept: FAMILY MEDICINE CLINIC | Facility: CLINIC | Age: 77
End: 2022-08-16

## 2022-08-16 RX ORDER — INSULIN GLARGINE 100 [IU]/ML
40 INJECTION, SOLUTION SUBCUTANEOUS NIGHTLY
Qty: 45 ML | Refills: 0 | Status: SHIPPED | OUTPATIENT
Start: 2022-08-16

## 2022-08-16 RX ORDER — INSULIN LISPRO 100 [IU]/ML
INJECTION, SOLUTION INTRAVENOUS; SUBCUTANEOUS
Qty: 75 ML | Refills: 0 | Status: SHIPPED | OUTPATIENT
Start: 2022-08-16

## 2022-08-16 NOTE — TELEPHONE ENCOUNTER
Granddaughter advised. Verbalized understanding.  She will be the one helping patient with meds    Future Appointments   Date Time Provider Mariluz Cheemai   8/18/2022 11:30 Abelino Doyle MD [facility] [place]   10/19/2022 11:30 AM Blaze Santos MD [facility]

## 2022-08-16 NOTE — TELEPHONE ENCOUNTER
Granddaughter called requesting insulin dosing schedule and how to give insulin  Looks like:  Lispro sliding scale: max dose 75 units daily  Lantus 40 units nightly  Testing glucose QID

## 2022-08-16 NOTE — TELEPHONE ENCOUNTER
May be DR. Seferino Casanova might know more about his insulin dosage sorry did not want to give wrong information. Dr. Seferino Casanova can you please confirm dose below.

## 2022-08-16 NOTE — TELEPHONE ENCOUNTER
Pt granddaughter Ryan Hugo, talked to On-Call  last night regarding his Insulin.   She has questions as to when she should giving him to give him the Slow acting and Fasting Acting Insulin, And wants to know where to administer the vaccines   Pt gave Verbal Release of Information for his Grand-Daughter General and Sensitive

## 2022-08-17 ENCOUNTER — TELEPHONE (OUTPATIENT)
Dept: FAMILY MEDICINE CLINIC | Facility: CLINIC | Age: 77
End: 2022-08-17

## 2022-08-17 RX ORDER — RIVAROXABAN 20 MG/1
TABLET, FILM COATED ORAL
Qty: 90 TABLET | Refills: 0 | Status: SHIPPED | OUTPATIENT
Start: 2022-08-17

## 2022-08-17 NOTE — TELEPHONE ENCOUNTER
Last refilled on 3/14/22 for # 90 with 1 refills  Last OV 4/22/22  Future Appointments   Date Time Provider Mariluz Senait   8/18/2022 11:30 Jose Alberto Meyers MD [facility] [place]   10/19/2022 11:30 AM Carmen Mckeon MD [facility] [place]        Thank you.

## 2022-08-17 NOTE — TELEPHONE ENCOUNTER
Patient has VV tomorrow with Dr Paz Bunch for med check. We received fax from Sharath Richardson with [facility] today asking for signature from physician for non sterile gauze dressings. Dr Yamileth Potter signed form but advised that the need for this wasn't discussed when she did VV with them the other day. Asked that Dr Paz Bunch review form and see if ok to fax.  Form in Dr Paz Bunch bin    Future Appointments   Date Time Provider Mariluz Sapp   8/18/2022 11:30 Karol Alanis MD [facility]   10/19/2022 11:30 AM Derwood Kocher, MD [facility] [place]

## 2022-08-18 ENCOUNTER — TELEPHONE (OUTPATIENT)
Dept: FAMILY MEDICINE CLINIC | Facility: CLINIC | Age: 77
End: 2022-08-18

## 2022-08-18 ENCOUNTER — TELEMEDICINE (OUTPATIENT)
Dept: FAMILY MEDICINE CLINIC | Facility: CLINIC | Age: 77
End: 2022-08-18

## 2022-08-18 DIAGNOSIS — N18.30 DIABETES MELLITUS WITH STAGE 3 CHRONIC KIDNEY DISEASE (HCC): Primary | ICD-10-CM

## 2022-08-18 DIAGNOSIS — E11.22 DIABETES MELLITUS WITH STAGE 3 CHRONIC KIDNEY DISEASE (HCC): Primary | ICD-10-CM

## 2022-08-18 DIAGNOSIS — E66.01 SEVERE OBESITY (BMI 35.0-39.9) WITH COMORBIDITY (HCC): ICD-10-CM

## 2022-08-18 DIAGNOSIS — I63.9 CEREBROVASCULAR ACCIDENT (CVA), UNSPECIFIED MECHANISM (HCC): Primary | ICD-10-CM

## 2022-08-18 DIAGNOSIS — E11.22 DIABETES MELLITUS WITH STAGE 3 CHRONIC KIDNEY DISEASE (HCC): ICD-10-CM

## 2022-08-18 DIAGNOSIS — N18.30 DIABETES MELLITUS WITH STAGE 3 CHRONIC KIDNEY DISEASE (HCC): ICD-10-CM

## 2022-08-18 DIAGNOSIS — G82.20 PARAPLEGIA (HCC): ICD-10-CM

## 2022-08-18 PROCEDURE — 99213 OFFICE O/P EST LOW 20 MIN: CPT | Performed by: FAMILY MEDICINE

## 2022-08-18 RX ORDER — BLOOD-GLUCOSE,RECEIVER,CONT
EACH MISCELLANEOUS
Qty: 1 EACH | Refills: 0 | Status: SHIPPED | OUTPATIENT
Start: 2022-08-18 | End: 2022-08-18

## 2022-08-18 RX ORDER — BLOOD-GLUCOSE,RECEIVER,CONT
EACH MISCELLANEOUS
Qty: 1 EACH | Refills: 0 | Status: SHIPPED | OUTPATIENT
Start: 2022-08-18

## 2022-08-18 NOTE — TELEPHONE ENCOUNTER
Annel @ [facility] called, states the    Phlebotomist went to the home to draw labs today, states Pt was cold and clammy and tried to draw but was unsuccessful. Family did not want the Phlebotomist to try again today, states he was not up to it. Does Dr Tres Stone want the phlebotomist go back to the home and try again this week or next?      Ph. [phone]

## 2022-08-18 NOTE — TELEPHONE ENCOUNTER
Called Coral with HH. States their phlebotomist went to draw CBC today and patient was cold/clammy and she wasn't able to get the labs. Family wanted her to come back tomorrow. No vitals done    Called granddaughter Ema Viramontes for update on patient  Patient feeling better, had snack  OT coming tomorrow  Phlebotomist coming tomorrow  PT coming on Monday    Patient had stroke 13 years ago and got a hospital bed.  Needs new mattress-would like pressure relieving bariatric mattress  Found old order for the bariatric hospital bed from 2013 in [address] [facility]  DME order placed and faxed to [facility] notes from 4/22/22 and ins card    Also needs new diabetic shoes with [facility] Collazo has a form she will fax us    Would like Dexcom resent to  [address]-sent this    Watch for [employer]    Granddaughter going away for 5 days at the end of the month-so patient's son will be helping patient

## 2022-08-19 ENCOUNTER — TELEPHONE (OUTPATIENT)
Dept: FAMILY MEDICINE CLINIC | Facility: CLINIC | Age: 77
End: 2022-08-19

## 2022-08-19 NOTE — TELEPHONE ENCOUNTER
Esperanza Rubio with [facility] calling requesting verbal order for OT once a week for 4 weeks.  Also wants to give Dr. Joni Ortiz pt BP was low today 100/60

## 2022-08-19 NOTE — TELEPHONE ENCOUNTER
lmtcb    Fax received on 08/19/22  Per [facility]  pharmacy plan does not cover this RX :  DEXCOM G6 . Pharmacy advise to change this to Accu-check or True metrix preferred per [employer].

## 2022-08-19 NOTE — TELEPHONE ENCOUNTER
Daughter notified. She is going to call the insurance regarding that. They have a glucometer at home that requires a fingerstick but they are wanting to switch over to the implantable meter. *Daughter has a question about his glucose levels and insulin use. BS right now is 101. Pt no longer using Lantus at night per DB orders. She is not sure if she should be giving him insulin today? Daughter was not sure of the sliding scale parameters. Call was disconnected and I tried calling Genoveva back x2 but her VM is full. Dr. Anita Olmos, do you know his sliding scale parameters for his short acting insulin?

## 2022-08-19 NOTE — TELEPHONE ENCOUNTER
Antoinette OT with [facility] transferred to me requesting verbal orders for OT once a week for 4 weeks. Verbal OK given. Pt requiring maximum assists with any movement/transfers right now. Pt lost a lot of strength when he was in the hospital.  BP per below -- Antoinette advised the family to check it a few more times today to keep an eye on it. FYI.

## 2022-08-19 NOTE — TELEPHONE ENCOUNTER
Form received from [facility] regarding the hospital bed order. They need a signed script (attached to forms), a H&P proving he had a face-to-face in the last 120 days, and the note has to clearly state pt meets 1 of 4 criteria for obtaining the hospital bed. Criteria:  1. Pt has medical condition which requires positioning of the body in ways not feasible with an ordinary bed (ie. Elevation of head or upper body of >30 degrees). 2. Pt requires positioning of the body not feasible with an ordinary bed in order to relieve pain. 3. The head of the bed would need to be elevated >30 degrees due to medical issues such as CHF, pulmonary disease, problems with aspiration. 4. The pt requires traction equipment. Forms in DB bin. Still waiting on forms from [facility] for the diabetic shoes.

## 2022-08-19 NOTE — TELEPHONE ENCOUNTER
OV note from April faxed along with signed order. If this is not sufficient then pt will need to come in for an OV since we have not seen him in the office since April and they require face-to-face documentation. Form in RN pending folder. Still waiting on DM shoe fax.

## 2022-08-19 NOTE — TELEPHONE ENCOUNTER
Per DB, granddaughter should know pts sliding scale parameters as they went over them during pts televisit yesterday. If she calls back, please verify it is the granddaughter calling and have RN talk to her.

## 2022-08-20 NOTE — TELEPHONE ENCOUNTER
Form from [facility] received for pts diabetic shoes. Put in DB bin to complete. Will need televisit notes when signed to attach to form.

## 2022-08-22 ENCOUNTER — TELEPHONE (OUTPATIENT)
Dept: FAMILY MEDICINE CLINIC | Facility: CLINIC | Age: 77
End: 2022-08-22

## 2022-08-22 RX ORDER — FOSINOPRIL SODIUM 10 MG/1
10 TABLET ORAL DAILY
Qty: 90 TABLET | Refills: 0 | Status: SHIPPED | OUTPATIENT
Start: 2022-08-22

## 2022-08-22 NOTE — TELEPHONE ENCOUNTER
Oscar @ [facility] called, Took pt's  BP this afternoon and was 112/64,  But pt grand-daughter Diana Recinos takes the BP in Morning and Evening the BP goes as low as 64/56,  On Average BP is around low 671D systolic. Pt is on 3 different BP medications. What are the Parameters of the BP? And which BP medication should they be holding? Please advise.       Rica Schwab #  [phone]   is secure, can leave message

## 2022-08-22 NOTE — TELEPHONE ENCOUNTER
Per med list pt taking:    Metoprolol 25mg daily  Diltiazem 180mg daily  Fosinopril 20mg daily    Please advise on BP parameters.   Future Appointments   Date Time Provider Mariluz Senait   10/19/2022 11:30 AM MD CHIARA Marie [facility]

## 2022-08-22 NOTE — TELEPHONE ENCOUNTER
Decreased fosinopril from 20 mg to 10 mg. Hold med if blood pressure less than 120/80. Continue other 2 medications as prescribed    If he is having episodes of hypoglycemia and hypotension, he should be seen in the office sooner than October.   I would like to see him within the next 2 weeks

## 2022-08-24 ENCOUNTER — TELEPHONE (OUTPATIENT)
Dept: FAMILY MEDICINE CLINIC | Facility: CLINIC | Age: 77
End: 2022-08-24

## 2022-08-24 NOTE — TELEPHONE ENCOUNTER
Call from granddaughter Tori.     this morning upon waking up at 11am  Gave 15 units fast acting insulin because patient not eating much, 2 glasses OJ and piece of toast  Wants to know when she is supposed to check BS again  Concerned because patient doesn't eat much and is on a weird sleep/wake schedule  Patient also requested a call from Dr Melany Edgar if patient is being truthful over eating etc  Please advise

## 2022-08-24 NOTE — TELEPHONE ENCOUNTER
Spoke with the granddaughter. FBG: range 80 to 180. Fasting blood glucose was 180 and 181 last 2 days. Plan:  Check fasting blood glucose every morning.   If continues to get blood glucose values greater than 160 for the next 3 days, start Lantus 10 units nightly    Continue to use sliding scale for mealtime insulin    All questions answered

## 2022-08-24 NOTE — TELEPHONE ENCOUNTER
Pt granddaughter Lakisha Chi called wanted to talk to Davion. per svitlana she was told to call in and tell  How pt was doing after 7 of stopping the slow acting insulin. Per svitlana she is going to be leaving the state so called early.

## 2022-09-02 ENCOUNTER — TELEPHONE (OUTPATIENT)
Dept: FAMILY MEDICINE CLINIC | Facility: CLINIC | Age: 77
End: 2022-09-02

## 2022-09-02 NOTE — TELEPHONE ENCOUNTER
Per patient Darienmerry Nora gave verbal okay to speak with Granddaughter Brijesh Garcia regarding his care  Called Tori and informed we faxed orders to [facility] on 8/19  Gave contact information to patient's family so they can follow up with order.     [facility] [address] Marcus Brooks [phone]

## 2022-09-03 NOTE — TELEPHONE ENCOUNTER
Spoke with pt and he gave permission for us to talk to svitlana bolden for today.  Gulshan Messina granddaughter will call in a bit

## 2022-09-03 NOTE — TELEPHONE ENCOUNTER
Daughter Genoveva calling asking if the new hospital bed will come with a new frame as well? I informed her she should contact [facility] to find out - we are not sure on our end what is included in the new bed order. Info provided to her for [facility].

## 2022-09-06 ENCOUNTER — NURSING HOME VISIT (OUTPATIENT)
Dept: POST ACUTE CARE | Age: 77
End: 2022-09-06

## 2022-09-06 DIAGNOSIS — N18.30 TYPE 2 DIABETES MELLITUS WITH STAGE 3 CHRONIC KIDNEY DISEASE, WITH LONG-TERM CURRENT USE OF INSULIN, UNSPECIFIED WHETHER STAGE 3A OR 3B CKD (CMD): ICD-10-CM

## 2022-09-06 DIAGNOSIS — Z79.4 TYPE 2 DIABETES MELLITUS WITH STAGE 3 CHRONIC KIDNEY DISEASE, WITH LONG-TERM CURRENT USE OF INSULIN, UNSPECIFIED WHETHER STAGE 3A OR 3B CKD (CMD): ICD-10-CM

## 2022-09-06 DIAGNOSIS — R53.81 DEBILITY: ICD-10-CM

## 2022-09-06 DIAGNOSIS — D72.829 LEUKOCYTOSIS, UNSPECIFIED TYPE: Primary | ICD-10-CM

## 2022-09-06 DIAGNOSIS — N18.30 STAGE 3 CHRONIC KIDNEY DISEASE, UNSPECIFIED WHETHER STAGE 3A OR 3B CKD (CMD): ICD-10-CM

## 2022-09-06 DIAGNOSIS — E78.5 HYPERLIPIDEMIA ASSOCIATED WITH TYPE 2 DIABETES MELLITUS (CMD): ICD-10-CM

## 2022-09-06 DIAGNOSIS — R33.8 ACUTE URINARY RETENTION: ICD-10-CM

## 2022-09-06 DIAGNOSIS — E66.9 CLASS 2 OBESITY IN ADULT, UNSPECIFIED BMI, UNSPECIFIED OBESITY TYPE, UNSPECIFIED WHETHER SERIOUS COMORBIDITY PRESENT: ICD-10-CM

## 2022-09-06 DIAGNOSIS — E11.22 TYPE 2 DIABETES MELLITUS WITH STAGE 3 CHRONIC KIDNEY DISEASE, WITH LONG-TERM CURRENT USE OF INSULIN, UNSPECIFIED WHETHER STAGE 3A OR 3B CKD (CMD): ICD-10-CM

## 2022-09-06 DIAGNOSIS — N30.00 ACUTE CYSTITIS WITHOUT HEMATURIA: ICD-10-CM

## 2022-09-06 DIAGNOSIS — Z86.73 HISTORY OF STROKE: ICD-10-CM

## 2022-09-06 DIAGNOSIS — E11.69 HYPERLIPIDEMIA ASSOCIATED WITH TYPE 2 DIABETES MELLITUS (CMD): ICD-10-CM

## 2022-09-06 DIAGNOSIS — I10 HYPERTENSION, BENIGN: ICD-10-CM

## 2022-09-06 DIAGNOSIS — I48.19 PERSISTENT ATRIAL FIBRILLATION (CMD): ICD-10-CM

## 2022-09-06 PROCEDURE — 1126F AMNT PAIN NOTED NONE PRSNT: CPT

## 2022-09-06 PROCEDURE — 99310 SBSQ NF CARE HIGH MDM 45: CPT

## 2022-09-06 PROCEDURE — 1160F RVW MEDS BY RX/DR IN RCRD: CPT

## 2022-09-06 PROCEDURE — 99497 ADVNCD CARE PLAN 30 MIN: CPT

## 2022-09-06 ASSESSMENT — PAIN SCALES - GENERAL: PAINLEVEL: 0

## 2022-09-07 ENCOUNTER — V-VISIT (OUTPATIENT)
Dept: POST ACUTE CARE | Age: 77
End: 2022-09-07

## 2022-09-07 VITALS
HEART RATE: 80 BPM | OXYGEN SATURATION: 96 % | TEMPERATURE: 97.5 F | SYSTOLIC BLOOD PRESSURE: 154 MMHG | DIASTOLIC BLOOD PRESSURE: 66 MMHG | RESPIRATION RATE: 18 BRPM

## 2022-09-07 VITALS
RESPIRATION RATE: 16 BRPM | OXYGEN SATURATION: 98 % | DIASTOLIC BLOOD PRESSURE: 72 MMHG | HEART RATE: 91 BPM | SYSTOLIC BLOOD PRESSURE: 130 MMHG | TEMPERATURE: 97.9 F

## 2022-09-07 DIAGNOSIS — Z86.73 HISTORY OF STROKE: ICD-10-CM

## 2022-09-07 DIAGNOSIS — Z79.01 CHRONIC ANTICOAGULATION: ICD-10-CM

## 2022-09-07 DIAGNOSIS — Z71.89 ACP (ADVANCE CARE PLANNING): ICD-10-CM

## 2022-09-07 DIAGNOSIS — Z79.4 TYPE 2 DIABETES MELLITUS WITH STAGE 3 CHRONIC KIDNEY DISEASE, WITH LONG-TERM CURRENT USE OF INSULIN, UNSPECIFIED WHETHER STAGE 3A OR 3B CKD (CMD): ICD-10-CM

## 2022-09-07 DIAGNOSIS — R33.8 ACUTE URINARY RETENTION: Primary | ICD-10-CM

## 2022-09-07 DIAGNOSIS — R53.81 DEBILITY: ICD-10-CM

## 2022-09-07 DIAGNOSIS — N18.30 STAGE 3 CHRONIC KIDNEY DISEASE, UNSPECIFIED WHETHER STAGE 3A OR 3B CKD (CMD): ICD-10-CM

## 2022-09-07 DIAGNOSIS — I25.10 CORONARY ARTERY DISEASE INVOLVING NATIVE CORONARY ARTERY OF NATIVE HEART WITHOUT ANGINA PECTORIS: ICD-10-CM

## 2022-09-07 DIAGNOSIS — E66.9 CLASS 2 OBESITY IN ADULT, UNSPECIFIED BMI, UNSPECIFIED OBESITY TYPE, UNSPECIFIED WHETHER SERIOUS COMORBIDITY PRESENT: ICD-10-CM

## 2022-09-07 DIAGNOSIS — N30.00 ACUTE CYSTITIS WITHOUT HEMATURIA: ICD-10-CM

## 2022-09-07 DIAGNOSIS — I48.19 PERSISTENT ATRIAL FIBRILLATION (CMD): ICD-10-CM

## 2022-09-07 DIAGNOSIS — E11.22 TYPE 2 DIABETES MELLITUS WITH STAGE 3 CHRONIC KIDNEY DISEASE, WITH LONG-TERM CURRENT USE OF INSULIN, UNSPECIFIED WHETHER STAGE 3A OR 3B CKD (CMD): ICD-10-CM

## 2022-09-07 DIAGNOSIS — N18.30 TYPE 2 DIABETES MELLITUS WITH STAGE 3 CHRONIC KIDNEY DISEASE, WITH LONG-TERM CURRENT USE OF INSULIN, UNSPECIFIED WHETHER STAGE 3A OR 3B CKD (CMD): ICD-10-CM

## 2022-09-07 DIAGNOSIS — I10 HYPERTENSION, BENIGN: ICD-10-CM

## 2022-09-07 PROBLEM — E78.5 HYPERLIPIDEMIA ASSOCIATED WITH TYPE 2 DIABETES MELLITUS (CMD): Status: ACTIVE | Noted: 2017-11-13

## 2022-09-07 PROBLEM — E11.69 HYPERLIPIDEMIA ASSOCIATED WITH TYPE 2 DIABETES MELLITUS (CMD): Status: ACTIVE | Noted: 2017-11-13

## 2022-09-07 PROBLEM — D72.829 LEUCOCYTOSIS: Status: ACTIVE | Noted: 2022-08-29

## 2022-09-07 PROBLEM — E03.8 OTHER SPECIFIED HYPOTHYROIDISM: Status: ACTIVE | Noted: 2022-09-07

## 2022-09-07 PROCEDURE — 1160F RVW MEDS BY RX/DR IN RCRD: CPT | Performed by: FAMILY MEDICINE

## 2022-09-07 PROCEDURE — 1157F ADVNC CARE PLAN IN RCRD: CPT | Performed by: FAMILY MEDICINE

## 2022-09-07 PROCEDURE — 1170F FXNL STATUS ASSESSED: CPT | Performed by: FAMILY MEDICINE

## 2022-09-07 PROCEDURE — 1126F AMNT PAIN NOTED NONE PRSNT: CPT | Performed by: FAMILY MEDICINE

## 2022-09-07 PROCEDURE — 99306 1ST NF CARE HIGH MDM 50: CPT | Performed by: FAMILY MEDICINE

## 2022-09-07 PROCEDURE — 1158F ADVNC CARE PLAN TLK DOCD: CPT | Performed by: FAMILY MEDICINE

## 2022-09-07 PROCEDURE — 99497 ADVNCD CARE PLAN 30 MIN: CPT | Performed by: FAMILY MEDICINE

## 2022-09-07 RX ORDER — METOPROLOL SUCCINATE 25 MG/1
25 TABLET, EXTENDED RELEASE ORAL DAILY
COMMUNITY
End: 2022-09-27

## 2022-09-07 ASSESSMENT — ENCOUNTER SYMPTOMS
DIARRHEA: 0
AGITATION: 0
DIZZINESS: 0
NAUSEA: 0
HEADACHES: 0
CHILLS: 0
SINUS PRESSURE: 0
SINUS PAIN: 0
EYE REDNESS: 0
PHOTOPHOBIA: 0
WEAKNESS: 1
POLYPHAGIA: 0
SLEEP DISTURBANCE: 0
WHEEZING: 0
ACTIVITY CHANGE: 1
SORE THROAT: 0
POLYDIPSIA: 0
BACK PAIN: 0
LIGHT-HEADEDNESS: 0
NERVOUS/ANXIOUS: 0
SHORTNESS OF BREATH: 0
FEVER: 0
RHINORRHEA: 0
CONSTIPATION: 0
VOMITING: 0
COLOR CHANGE: 0

## 2022-09-08 ENCOUNTER — NURSING HOME VISIT (OUTPATIENT)
Dept: POST ACUTE CARE | Age: 77
End: 2022-09-08

## 2022-09-08 ENCOUNTER — TELEPHONE (OUTPATIENT)
Dept: FAMILY MEDICINE CLINIC | Facility: CLINIC | Age: 77
End: 2022-09-08

## 2022-09-08 VITALS
RESPIRATION RATE: 18 BRPM | SYSTOLIC BLOOD PRESSURE: 160 MMHG | OXYGEN SATURATION: 96 % | TEMPERATURE: 97.8 F | DIASTOLIC BLOOD PRESSURE: 75 MMHG | HEART RATE: 90 BPM

## 2022-09-08 DIAGNOSIS — I48.19 PERSISTENT ATRIAL FIBRILLATION (CMD): ICD-10-CM

## 2022-09-08 DIAGNOSIS — N18.30 TYPE 2 DIABETES MELLITUS WITH STAGE 3 CHRONIC KIDNEY DISEASE, WITH LONG-TERM CURRENT USE OF INSULIN, UNSPECIFIED WHETHER STAGE 3A OR 3B CKD (CMD): ICD-10-CM

## 2022-09-08 DIAGNOSIS — G47.09 OTHER INSOMNIA: ICD-10-CM

## 2022-09-08 DIAGNOSIS — R53.81 DEBILITY: ICD-10-CM

## 2022-09-08 DIAGNOSIS — I10 HYPERTENSION, BENIGN: ICD-10-CM

## 2022-09-08 DIAGNOSIS — D72.829 LEUKOCYTOSIS, UNSPECIFIED TYPE: ICD-10-CM

## 2022-09-08 DIAGNOSIS — E11.22 TYPE 2 DIABETES MELLITUS WITH STAGE 3 CHRONIC KIDNEY DISEASE, WITH LONG-TERM CURRENT USE OF INSULIN, UNSPECIFIED WHETHER STAGE 3A OR 3B CKD (CMD): ICD-10-CM

## 2022-09-08 DIAGNOSIS — R33.8 ACUTE URINARY RETENTION: ICD-10-CM

## 2022-09-08 DIAGNOSIS — Z79.4 TYPE 2 DIABETES MELLITUS WITH STAGE 3 CHRONIC KIDNEY DISEASE, WITH LONG-TERM CURRENT USE OF INSULIN, UNSPECIFIED WHETHER STAGE 3A OR 3B CKD (CMD): ICD-10-CM

## 2022-09-08 DIAGNOSIS — N30.00 ACUTE CYSTITIS WITHOUT HEMATURIA: Primary | ICD-10-CM

## 2022-09-08 PROCEDURE — 99308 SBSQ NF CARE LOW MDM 20: CPT

## 2022-09-08 PROCEDURE — 1170F FXNL STATUS ASSESSED: CPT

## 2022-09-08 PROCEDURE — 1126F AMNT PAIN NOTED NONE PRSNT: CPT

## 2022-09-08 ASSESSMENT — ENCOUNTER SYMPTOMS
WEAKNESS: 1
ABDOMINAL PAIN: 0
EYE DISCHARGE: 0
CONFUSION: 0
COLOR CHANGE: 0
DIARRHEA: 0
AGITATION: 0
POLYPHAGIA: 0
RHINORRHEA: 0
ACTIVITY CHANGE: 1
CONSTIPATION: 0
AGITATION: 0
SHORTNESS OF BREATH: 0
SLEEP DISTURBANCE: 1
LIGHT-HEADEDNESS: 0
RHINORRHEA: 0
POLYDIPSIA: 0
VOMITING: 0
SORE THROAT: 0
HEADACHES: 0
FATIGUE: 0
NERVOUS/ANXIOUS: 0
NERVOUS/ANXIOUS: 0
CONSTIPATION: 0
EYE ITCHING: 0
BACK PAIN: 0
SINUS PRESSURE: 0
ACTIVITY CHANGE: 1
DIZZINESS: 0
SHORTNESS OF BREATH: 0
SORE THROAT: 0
CHILLS: 0
DIARRHEA: 0
BACK PAIN: 0
WHEEZING: 0
HEADACHES: 0
SINUS PRESSURE: 0
SINUS PAIN: 0
WEAKNESS: 1
SLEEP DISTURBANCE: 0
NAUSEA: 0
LIGHT-HEADEDNESS: 0
NAUSEA: 0
WHEEZING: 0
SINUS PAIN: 0
COLOR CHANGE: 0
EYE PAIN: 0
FEVER: 0
VOMITING: 0
FEVER: 0
DIZZINESS: 0

## 2022-09-08 ASSESSMENT — PAIN SCALES - GENERAL: PAINLEVEL: 0

## 2022-09-08 NOTE — TELEPHONE ENCOUNTER
Grand daughter Tahmina Tse wants to get a referral for a Urologist.  Pt is in [address] right now.   Tori martinez

## 2022-09-12 ENCOUNTER — NURSING HOME VISIT (OUTPATIENT)
Dept: POST ACUTE CARE | Age: 77
End: 2022-09-12

## 2022-09-12 DIAGNOSIS — E11.22 TYPE 2 DIABETES MELLITUS WITH STAGE 3 CHRONIC KIDNEY DISEASE, WITH LONG-TERM CURRENT USE OF INSULIN, UNSPECIFIED WHETHER STAGE 3A OR 3B CKD (CMD): ICD-10-CM

## 2022-09-12 DIAGNOSIS — G47.09 OTHER INSOMNIA: ICD-10-CM

## 2022-09-12 DIAGNOSIS — Z79.4 TYPE 2 DIABETES MELLITUS WITH STAGE 3 CHRONIC KIDNEY DISEASE, WITH LONG-TERM CURRENT USE OF INSULIN, UNSPECIFIED WHETHER STAGE 3A OR 3B CKD (CMD): ICD-10-CM

## 2022-09-12 DIAGNOSIS — Z79.01 CHRONIC ANTICOAGULATION: ICD-10-CM

## 2022-09-12 DIAGNOSIS — R53.81 DEBILITY: Primary | ICD-10-CM

## 2022-09-12 DIAGNOSIS — I10 HYPERTENSION, BENIGN: ICD-10-CM

## 2022-09-12 DIAGNOSIS — N18.30 STAGE 3 CHRONIC KIDNEY DISEASE, UNSPECIFIED WHETHER STAGE 3A OR 3B CKD (CMD): ICD-10-CM

## 2022-09-12 DIAGNOSIS — I48.19 PERSISTENT ATRIAL FIBRILLATION (CMD): ICD-10-CM

## 2022-09-12 DIAGNOSIS — N18.30 TYPE 2 DIABETES MELLITUS WITH STAGE 3 CHRONIC KIDNEY DISEASE, WITH LONG-TERM CURRENT USE OF INSULIN, UNSPECIFIED WHETHER STAGE 3A OR 3B CKD (CMD): ICD-10-CM

## 2022-09-12 DIAGNOSIS — Z86.73 HISTORY OF STROKE: ICD-10-CM

## 2022-09-12 DIAGNOSIS — E11.69 HYPERLIPIDEMIA ASSOCIATED WITH TYPE 2 DIABETES MELLITUS (CMD): ICD-10-CM

## 2022-09-12 DIAGNOSIS — E78.5 HYPERLIPIDEMIA ASSOCIATED WITH TYPE 2 DIABETES MELLITUS (CMD): ICD-10-CM

## 2022-09-12 PROCEDURE — 1160F RVW MEDS BY RX/DR IN RCRD: CPT | Performed by: NURSE PRACTITIONER

## 2022-09-12 PROCEDURE — 99309 SBSQ NF CARE MODERATE MDM 30: CPT | Performed by: NURSE PRACTITIONER

## 2022-09-12 PROCEDURE — 1158F ADVNC CARE PLAN TLK DOCD: CPT | Performed by: NURSE PRACTITIONER

## 2022-09-12 PROCEDURE — 1170F FXNL STATUS ASSESSED: CPT | Performed by: NURSE PRACTITIONER

## 2022-09-12 PROCEDURE — 1126F AMNT PAIN NOTED NONE PRSNT: CPT | Performed by: NURSE PRACTITIONER

## 2022-09-12 ASSESSMENT — PAIN SCALES - GENERAL: PAINLEVEL: 0

## 2022-09-12 NOTE — TELEPHONE ENCOUNTER
Due for repeat CBC  [facility] sent  Future Appointments   Date Time Provider Mariluz Sapp   10/19/2022 11:30 AM Mal Dickey MD [facility]

## 2022-09-13 ENCOUNTER — NURSING HOME VISIT (OUTPATIENT)
Dept: POST ACUTE CARE | Age: 77
End: 2022-09-13

## 2022-09-13 VITALS
SYSTOLIC BLOOD PRESSURE: 114 MMHG | HEART RATE: 79 BPM | RESPIRATION RATE: 18 BRPM | TEMPERATURE: 97.5 F | OXYGEN SATURATION: 96 % | DIASTOLIC BLOOD PRESSURE: 67 MMHG

## 2022-09-13 DIAGNOSIS — E11.22 TYPE 2 DIABETES MELLITUS WITH STAGE 3 CHRONIC KIDNEY DISEASE, WITH LONG-TERM CURRENT USE OF INSULIN, UNSPECIFIED WHETHER STAGE 3A OR 3B CKD (CMD): ICD-10-CM

## 2022-09-13 DIAGNOSIS — E78.5 HYPERLIPIDEMIA ASSOCIATED WITH TYPE 2 DIABETES MELLITUS (CMD): ICD-10-CM

## 2022-09-13 DIAGNOSIS — E11.69 HYPERLIPIDEMIA ASSOCIATED WITH TYPE 2 DIABETES MELLITUS (CMD): ICD-10-CM

## 2022-09-13 DIAGNOSIS — N18.30 STAGE 3 CHRONIC KIDNEY DISEASE, UNSPECIFIED WHETHER STAGE 3A OR 3B CKD (CMD): ICD-10-CM

## 2022-09-13 DIAGNOSIS — N18.30 TYPE 2 DIABETES MELLITUS WITH STAGE 3 CHRONIC KIDNEY DISEASE, WITH LONG-TERM CURRENT USE OF INSULIN, UNSPECIFIED WHETHER STAGE 3A OR 3B CKD (CMD): ICD-10-CM

## 2022-09-13 DIAGNOSIS — G47.09 OTHER INSOMNIA: ICD-10-CM

## 2022-09-13 DIAGNOSIS — Z79.4 TYPE 2 DIABETES MELLITUS WITH STAGE 3 CHRONIC KIDNEY DISEASE, WITH LONG-TERM CURRENT USE OF INSULIN, UNSPECIFIED WHETHER STAGE 3A OR 3B CKD (CMD): ICD-10-CM

## 2022-09-13 DIAGNOSIS — I48.19 PERSISTENT ATRIAL FIBRILLATION (CMD): ICD-10-CM

## 2022-09-13 DIAGNOSIS — Z79.01 CHRONIC ANTICOAGULATION: ICD-10-CM

## 2022-09-13 DIAGNOSIS — Z86.73 HISTORY OF STROKE: ICD-10-CM

## 2022-09-13 DIAGNOSIS — R53.81 DEBILITY: Primary | ICD-10-CM

## 2022-09-13 DIAGNOSIS — I10 HYPERTENSION, BENIGN: ICD-10-CM

## 2022-09-13 PROCEDURE — 1160F RVW MEDS BY RX/DR IN RCRD: CPT | Performed by: NURSE PRACTITIONER

## 2022-09-13 PROCEDURE — 1158F ADVNC CARE PLAN TLK DOCD: CPT | Performed by: NURSE PRACTITIONER

## 2022-09-13 PROCEDURE — 99309 SBSQ NF CARE MODERATE MDM 30: CPT | Performed by: NURSE PRACTITIONER

## 2022-09-13 PROCEDURE — 1170F FXNL STATUS ASSESSED: CPT | Performed by: NURSE PRACTITIONER

## 2022-09-13 PROCEDURE — 1126F AMNT PAIN NOTED NONE PRSNT: CPT | Performed by: NURSE PRACTITIONER

## 2022-09-13 RX ORDER — ACETAMINOPHEN 325 MG/1
650 TABLET ORAL EVERY 6 HOURS PRN
COMMUNITY
Start: 2022-09-28

## 2022-09-13 RX ORDER — LANOLIN ALCOHOL/MO/W.PET/CERES
1 CREAM (GRAM) TOPICAL AT BEDTIME
COMMUNITY
Start: 2022-09-28

## 2022-09-13 RX ORDER — AMOXICILLIN 250 MG
1 CAPSULE ORAL DAILY PRN
COMMUNITY
Start: 2022-09-28

## 2022-09-13 RX ORDER — POLYETHYLENE GLYCOL 3350 17 G/17G
17 POWDER, FOR SOLUTION ORAL DAILY PRN
COMMUNITY
Start: 2022-09-28

## 2022-09-13 ASSESSMENT — ENCOUNTER SYMPTOMS
BACK PAIN: 0
AGITATION: 0
DIARRHEA: 0
ACTIVITY CHANGE: 1
NERVOUS/ANXIOUS: 0
HEADACHES: 0
DIZZINESS: 0
WEAKNESS: 1
VOMITING: 0
CONSTIPATION: 0
COLOR CHANGE: 0
RHINORRHEA: 0
SHORTNESS OF BREATH: 0
SLEEP DISTURBANCE: 1
NAUSEA: 0
WHEEZING: 0
FEVER: 0
SORE THROAT: 0
CHILLS: 0
SINUS PRESSURE: 0
LIGHT-HEADEDNESS: 0
SINUS PAIN: 0

## 2022-09-13 ASSESSMENT — PAIN SCALES - GENERAL: PAINLEVEL: 0

## 2022-09-14 VITALS
HEART RATE: 74 BPM | SYSTOLIC BLOOD PRESSURE: 117 MMHG | TEMPERATURE: 97.5 F | RESPIRATION RATE: 18 BRPM | OXYGEN SATURATION: 97 % | DIASTOLIC BLOOD PRESSURE: 59 MMHG

## 2022-09-14 ASSESSMENT — ENCOUNTER SYMPTOMS
COLOR CHANGE: 0
SORE THROAT: 0
AGITATION: 0
SINUS PAIN: 0
HEADACHES: 0
ACTIVITY CHANGE: 1
FEVER: 0
WEAKNESS: 1
WHEEZING: 0
CHILLS: 0
SINUS PRESSURE: 0
LIGHT-HEADEDNESS: 0
DIARRHEA: 0
SHORTNESS OF BREATH: 0
NAUSEA: 0
CONSTIPATION: 0
BACK PAIN: 0
NERVOUS/ANXIOUS: 0
SLEEP DISTURBANCE: 1
VOMITING: 0
DIZZINESS: 0
RHINORRHEA: 0

## 2022-09-16 ENCOUNTER — NURSING HOME VISIT (OUTPATIENT)
Dept: POST ACUTE CARE | Age: 77
End: 2022-09-16

## 2022-09-16 DIAGNOSIS — I25.10 CORONARY ARTERY DISEASE INVOLVING NATIVE CORONARY ARTERY OF NATIVE HEART WITHOUT ANGINA PECTORIS: ICD-10-CM

## 2022-09-16 DIAGNOSIS — E03.8 OTHER SPECIFIED HYPOTHYROIDISM: ICD-10-CM

## 2022-09-16 DIAGNOSIS — R53.81 DEBILITY: Primary | ICD-10-CM

## 2022-09-16 DIAGNOSIS — Z79.01 CHRONIC ANTICOAGULATION: ICD-10-CM

## 2022-09-16 DIAGNOSIS — N18.30 TYPE 2 DIABETES MELLITUS WITH STAGE 3 CHRONIC KIDNEY DISEASE, WITH LONG-TERM CURRENT USE OF INSULIN, UNSPECIFIED WHETHER STAGE 3A OR 3B CKD (CMD): ICD-10-CM

## 2022-09-16 DIAGNOSIS — N18.30 STAGE 3 CHRONIC KIDNEY DISEASE, UNSPECIFIED WHETHER STAGE 3A OR 3B CKD (CMD): ICD-10-CM

## 2022-09-16 DIAGNOSIS — E11.22 TYPE 2 DIABETES MELLITUS WITH STAGE 3 CHRONIC KIDNEY DISEASE, WITH LONG-TERM CURRENT USE OF INSULIN, UNSPECIFIED WHETHER STAGE 3A OR 3B CKD (CMD): ICD-10-CM

## 2022-09-16 DIAGNOSIS — Z79.4 TYPE 2 DIABETES MELLITUS WITH STAGE 3 CHRONIC KIDNEY DISEASE, WITH LONG-TERM CURRENT USE OF INSULIN, UNSPECIFIED WHETHER STAGE 3A OR 3B CKD (CMD): ICD-10-CM

## 2022-09-16 DIAGNOSIS — I48.19 PERSISTENT ATRIAL FIBRILLATION (CMD): ICD-10-CM

## 2022-09-16 DIAGNOSIS — I10 HYPERTENSION, BENIGN: ICD-10-CM

## 2022-09-16 PROCEDURE — 99310 SBSQ NF CARE HIGH MDM 45: CPT | Performed by: FAMILY MEDICINE

## 2022-09-17 VITALS
OXYGEN SATURATION: 98 % | HEART RATE: 88 BPM | DIASTOLIC BLOOD PRESSURE: 74 MMHG | RESPIRATION RATE: 26 BRPM | SYSTOLIC BLOOD PRESSURE: 134 MMHG | TEMPERATURE: 98 F

## 2022-09-17 PROBLEM — D72.829 LEUCOCYTOSIS: Status: RESOLVED | Noted: 2022-08-29 | Resolved: 2022-09-17

## 2022-09-17 PROBLEM — R33.8 ACUTE URINARY RETENTION: Status: RESOLVED | Noted: 2022-09-07 | Resolved: 2022-09-17

## 2022-09-17 ASSESSMENT — ENCOUNTER SYMPTOMS
DIZZINESS: 0
POLYDIPSIA: 0
FEVER: 0
ACTIVITY CHANGE: 1
AGITATION: 0
BACK PAIN: 0
CONSTIPATION: 0
CHILLS: 0
COLOR CHANGE: 0
SHORTNESS OF BREATH: 0
SINUS PAIN: 0
WHEEZING: 0
PHOTOPHOBIA: 0
SINUS PRESSURE: 0
LIGHT-HEADEDNESS: 0
WEAKNESS: 1
EYE REDNESS: 0
SORE THROAT: 0
RHINORRHEA: 0
HEADACHES: 0
SLEEP DISTURBANCE: 0
DIARRHEA: 0
VOMITING: 0
NAUSEA: 0
NERVOUS/ANXIOUS: 0
POLYPHAGIA: 0

## 2022-09-20 ENCOUNTER — NURSING HOME VISIT (OUTPATIENT)
Dept: POST ACUTE CARE | Age: 77
End: 2022-09-20

## 2022-09-20 VITALS
TEMPERATURE: 97.8 F | RESPIRATION RATE: 18 BRPM | SYSTOLIC BLOOD PRESSURE: 141 MMHG | HEART RATE: 82 BPM | OXYGEN SATURATION: 96 % | DIASTOLIC BLOOD PRESSURE: 69 MMHG

## 2022-09-20 DIAGNOSIS — N30.00 ACUTE CYSTITIS WITHOUT HEMATURIA: ICD-10-CM

## 2022-09-20 DIAGNOSIS — D72.829 LEUKOCYTOSIS, UNSPECIFIED TYPE: ICD-10-CM

## 2022-09-20 DIAGNOSIS — N17.9 AKI (ACUTE KIDNEY INJURY) (CMD): Primary | ICD-10-CM

## 2022-09-20 DIAGNOSIS — I10 HYPERTENSION, BENIGN: ICD-10-CM

## 2022-09-20 DIAGNOSIS — N18.30 TYPE 2 DIABETES MELLITUS WITH STAGE 3 CHRONIC KIDNEY DISEASE, WITH LONG-TERM CURRENT USE OF INSULIN, UNSPECIFIED WHETHER STAGE 3A OR 3B CKD (CMD): ICD-10-CM

## 2022-09-20 DIAGNOSIS — Z79.4 TYPE 2 DIABETES MELLITUS WITH STAGE 3 CHRONIC KIDNEY DISEASE, WITH LONG-TERM CURRENT USE OF INSULIN, UNSPECIFIED WHETHER STAGE 3A OR 3B CKD (CMD): ICD-10-CM

## 2022-09-20 DIAGNOSIS — R19.7 ACUTE DIARRHEA: ICD-10-CM

## 2022-09-20 DIAGNOSIS — R53.81 DEBILITY: ICD-10-CM

## 2022-09-20 DIAGNOSIS — E11.22 TYPE 2 DIABETES MELLITUS WITH STAGE 3 CHRONIC KIDNEY DISEASE, WITH LONG-TERM CURRENT USE OF INSULIN, UNSPECIFIED WHETHER STAGE 3A OR 3B CKD (CMD): ICD-10-CM

## 2022-09-20 DIAGNOSIS — I48.19 PERSISTENT ATRIAL FIBRILLATION (CMD): ICD-10-CM

## 2022-09-20 PROCEDURE — 1170F FXNL STATUS ASSESSED: CPT

## 2022-09-20 PROCEDURE — 1126F AMNT PAIN NOTED NONE PRSNT: CPT

## 2022-09-20 PROCEDURE — 99309 SBSQ NF CARE MODERATE MDM 30: CPT

## 2022-09-20 ASSESSMENT — ENCOUNTER SYMPTOMS
WEAKNESS: 1
DIARRHEA: 1
HEADACHES: 0
NERVOUS/ANXIOUS: 0
SINUS PRESSURE: 0
CONSTIPATION: 0
SINUS PAIN: 0
FEVER: 0
BACK PAIN: 0
AGITATION: 0
SLEEP DISTURBANCE: 1
VOMITING: 0
CHILLS: 0
SORE THROAT: 0
RHINORRHEA: 0
DIZZINESS: 0
LIGHT-HEADEDNESS: 0
COLOR CHANGE: 0
NAUSEA: 0
ACTIVITY CHANGE: 1
WHEEZING: 0
SHORTNESS OF BREATH: 0

## 2022-09-20 ASSESSMENT — PAIN SCALES - GENERAL: PAINLEVEL: 0

## 2022-09-21 ENCOUNTER — NURSING HOME VISIT (OUTPATIENT)
Dept: POST ACUTE CARE | Age: 77
End: 2022-09-21

## 2022-09-21 DIAGNOSIS — Z79.4 TYPE 2 DIABETES MELLITUS WITH STAGE 3 CHRONIC KIDNEY DISEASE, WITH LONG-TERM CURRENT USE OF INSULIN, UNSPECIFIED WHETHER STAGE 3A OR 3B CKD (CMD): ICD-10-CM

## 2022-09-21 DIAGNOSIS — E11.22 TYPE 2 DIABETES MELLITUS WITH STAGE 3 CHRONIC KIDNEY DISEASE, WITH LONG-TERM CURRENT USE OF INSULIN, UNSPECIFIED WHETHER STAGE 3A OR 3B CKD (CMD): ICD-10-CM

## 2022-09-21 DIAGNOSIS — I10 HYPERTENSION, BENIGN: ICD-10-CM

## 2022-09-21 DIAGNOSIS — R53.81 DEBILITY: Primary | ICD-10-CM

## 2022-09-21 DIAGNOSIS — N18.30 TYPE 2 DIABETES MELLITUS WITH STAGE 3 CHRONIC KIDNEY DISEASE, WITH LONG-TERM CURRENT USE OF INSULIN, UNSPECIFIED WHETHER STAGE 3A OR 3B CKD (CMD): ICD-10-CM

## 2022-09-21 DIAGNOSIS — E11.69 HYPERLIPIDEMIA ASSOCIATED WITH TYPE 2 DIABETES MELLITUS (CMD): ICD-10-CM

## 2022-09-21 DIAGNOSIS — N18.30 STAGE 3 CHRONIC KIDNEY DISEASE, UNSPECIFIED WHETHER STAGE 3A OR 3B CKD (CMD): ICD-10-CM

## 2022-09-21 DIAGNOSIS — I48.19 PERSISTENT ATRIAL FIBRILLATION (CMD): ICD-10-CM

## 2022-09-21 DIAGNOSIS — E78.5 HYPERLIPIDEMIA ASSOCIATED WITH TYPE 2 DIABETES MELLITUS (CMD): ICD-10-CM

## 2022-09-21 DIAGNOSIS — R19.7 ACUTE DIARRHEA: ICD-10-CM

## 2022-09-21 DIAGNOSIS — Z79.01 CHRONIC ANTICOAGULATION: ICD-10-CM

## 2022-09-21 PROCEDURE — 1158F ADVNC CARE PLAN TLK DOCD: CPT | Performed by: NURSE PRACTITIONER

## 2022-09-21 PROCEDURE — 1160F RVW MEDS BY RX/DR IN RCRD: CPT | Performed by: NURSE PRACTITIONER

## 2022-09-21 PROCEDURE — 99309 SBSQ NF CARE MODERATE MDM 30: CPT | Performed by: NURSE PRACTITIONER

## 2022-09-21 PROCEDURE — 1126F AMNT PAIN NOTED NONE PRSNT: CPT | Performed by: NURSE PRACTITIONER

## 2022-09-21 PROCEDURE — 1170F FXNL STATUS ASSESSED: CPT | Performed by: NURSE PRACTITIONER

## 2022-09-21 ASSESSMENT — PAIN SCALES - GENERAL: PAINLEVEL: 0

## 2022-09-22 ENCOUNTER — TELEPHONE (OUTPATIENT)
Dept: FAMILY MEDICINE CLINIC | Facility: CLINIC | Age: 77
End: 2022-09-22

## 2022-09-22 ENCOUNTER — NURSING HOME VISIT (OUTPATIENT)
Dept: POST ACUTE CARE | Age: 77
End: 2022-09-22

## 2022-09-22 ENCOUNTER — TELEPHONE (OUTPATIENT)
Dept: EXTERNAL CLINIC | Age: 77
End: 2022-09-22

## 2022-09-22 VITALS
SYSTOLIC BLOOD PRESSURE: 162 MMHG | RESPIRATION RATE: 18 BRPM | HEART RATE: 81 BPM | DIASTOLIC BLOOD PRESSURE: 67 MMHG | TEMPERATURE: 97.7 F | OXYGEN SATURATION: 96 %

## 2022-09-22 DIAGNOSIS — R19.7 ACUTE DIARRHEA: ICD-10-CM

## 2022-09-22 DIAGNOSIS — E78.5 HYPERLIPIDEMIA ASSOCIATED WITH TYPE 2 DIABETES MELLITUS (CMD): ICD-10-CM

## 2022-09-22 DIAGNOSIS — E11.22 TYPE 2 DIABETES MELLITUS WITH STAGE 3 CHRONIC KIDNEY DISEASE, WITH LONG-TERM CURRENT USE OF INSULIN, UNSPECIFIED WHETHER STAGE 3A OR 3B CKD (CMD): ICD-10-CM

## 2022-09-22 DIAGNOSIS — R53.81 DEBILITY: ICD-10-CM

## 2022-09-22 DIAGNOSIS — N18.30 TYPE 2 DIABETES MELLITUS WITH STAGE 3 CHRONIC KIDNEY DISEASE, WITH LONG-TERM CURRENT USE OF INSULIN, UNSPECIFIED WHETHER STAGE 3A OR 3B CKD (CMD): ICD-10-CM

## 2022-09-22 DIAGNOSIS — Z86.73 HISTORY OF STROKE: ICD-10-CM

## 2022-09-22 DIAGNOSIS — N17.9 AKI (ACUTE KIDNEY INJURY) (CMD): Primary | ICD-10-CM

## 2022-09-22 DIAGNOSIS — I48.19 PERSISTENT ATRIAL FIBRILLATION (CMD): ICD-10-CM

## 2022-09-22 DIAGNOSIS — E11.69 HYPERLIPIDEMIA ASSOCIATED WITH TYPE 2 DIABETES MELLITUS (CMD): ICD-10-CM

## 2022-09-22 DIAGNOSIS — D72.829 LEUKOCYTOSIS, UNSPECIFIED TYPE: ICD-10-CM

## 2022-09-22 DIAGNOSIS — I10 HYPERTENSION, BENIGN: ICD-10-CM

## 2022-09-22 DIAGNOSIS — G47.09 OTHER INSOMNIA: ICD-10-CM

## 2022-09-22 DIAGNOSIS — Z79.4 TYPE 2 DIABETES MELLITUS WITH STAGE 3 CHRONIC KIDNEY DISEASE, WITH LONG-TERM CURRENT USE OF INSULIN, UNSPECIFIED WHETHER STAGE 3A OR 3B CKD (CMD): ICD-10-CM

## 2022-09-22 PROCEDURE — 99309 SBSQ NF CARE MODERATE MDM 30: CPT

## 2022-09-22 PROCEDURE — 1126F AMNT PAIN NOTED NONE PRSNT: CPT

## 2022-09-22 ASSESSMENT — ENCOUNTER SYMPTOMS
CONSTIPATION: 0
ACTIVITY CHANGE: 1
SINUS PAIN: 0
WHEEZING: 0
SINUS PRESSURE: 0
BACK PAIN: 0
SLEEP DISTURBANCE: 1
NAUSEA: 0
COLOR CHANGE: 0
DIZZINESS: 0
LIGHT-HEADEDNESS: 0
RHINORRHEA: 0
CHILLS: 0
SORE THROAT: 0
VOMITING: 0
FEVER: 0
DIARRHEA: 1
NERVOUS/ANXIOUS: 0
SHORTNESS OF BREATH: 0
WEAKNESS: 1
AGITATION: 0
HEADACHES: 0

## 2022-09-22 ASSESSMENT — PAIN SCALES - GENERAL: PAINLEVEL: 0

## 2022-09-22 NOTE — TELEPHONE ENCOUNTER
PATIENT IS ASKING TO GET A BEDSIDE TABLE, CAN A REFERRAL BE SENT TO INS. SAMI IS ASKING FOR HIM.    PLEASE ADVISE

## 2022-09-22 NOTE — TELEPHONE ENCOUNTER
Granddaughter advised. Verbalized understanding. Also has several questions about getting continuous glucose monitor. States dexcom that was sent wasn't covered. Advised to schedule VV with Dr Candida Garza to discuss.     Future Appointments   Date Time Provider Mariluz Sapp   10/7/2022  2:00 PM MD CHIARA Ellis   10/19/2022 11:30 AM MD CHIARA Ellis Mis

## 2022-09-23 ENCOUNTER — TELEPHONE (OUTPATIENT)
Dept: FAMILY MEDICINE CLINIC | Facility: CLINIC | Age: 77
End: 2022-09-23

## 2022-09-23 ENCOUNTER — NURSING HOME VISIT (OUTPATIENT)
Dept: POST ACUTE CARE | Age: 77
End: 2022-09-23

## 2022-09-23 VITALS
HEART RATE: 78 BPM | TEMPERATURE: 97.8 F | OXYGEN SATURATION: 98 % | SYSTOLIC BLOOD PRESSURE: 131 MMHG | RESPIRATION RATE: 18 BRPM | DIASTOLIC BLOOD PRESSURE: 64 MMHG

## 2022-09-23 VITALS
SYSTOLIC BLOOD PRESSURE: 148 MMHG | OXYGEN SATURATION: 96 % | RESPIRATION RATE: 18 BRPM | HEART RATE: 100 BPM | DIASTOLIC BLOOD PRESSURE: 73 MMHG | TEMPERATURE: 98.4 F

## 2022-09-23 DIAGNOSIS — N18.30 TYPE 2 DIABETES MELLITUS WITH STAGE 3 CHRONIC KIDNEY DISEASE, WITH LONG-TERM CURRENT USE OF INSULIN, UNSPECIFIED WHETHER STAGE 3A OR 3B CKD (CMD): ICD-10-CM

## 2022-09-23 DIAGNOSIS — N18.30 STAGE 3 CHRONIC KIDNEY DISEASE, UNSPECIFIED WHETHER STAGE 3A OR 3B CKD (CMD): ICD-10-CM

## 2022-09-23 DIAGNOSIS — R31.0 GROSS HEMATURIA: ICD-10-CM

## 2022-09-23 DIAGNOSIS — N17.9 AKI (ACUTE KIDNEY INJURY) (CMD): ICD-10-CM

## 2022-09-23 DIAGNOSIS — Z79.4 TYPE 2 DIABETES MELLITUS WITH STAGE 3 CHRONIC KIDNEY DISEASE, WITH LONG-TERM CURRENT USE OF INSULIN, UNSPECIFIED WHETHER STAGE 3A OR 3B CKD (CMD): ICD-10-CM

## 2022-09-23 DIAGNOSIS — E03.8 OTHER SPECIFIED HYPOTHYROIDISM: ICD-10-CM

## 2022-09-23 DIAGNOSIS — I25.10 CORONARY ARTERY DISEASE INVOLVING NATIVE CORONARY ARTERY OF NATIVE HEART WITHOUT ANGINA PECTORIS: ICD-10-CM

## 2022-09-23 DIAGNOSIS — I48.19 PERSISTENT ATRIAL FIBRILLATION (CMD): ICD-10-CM

## 2022-09-23 DIAGNOSIS — E78.5 HYPERLIPIDEMIA ASSOCIATED WITH TYPE 2 DIABETES MELLITUS (CMD): ICD-10-CM

## 2022-09-23 DIAGNOSIS — R33.9 URINARY RETENTION: Primary | ICD-10-CM

## 2022-09-23 DIAGNOSIS — E11.22 TYPE 2 DIABETES MELLITUS WITH STAGE 3 CHRONIC KIDNEY DISEASE, WITH LONG-TERM CURRENT USE OF INSULIN, UNSPECIFIED WHETHER STAGE 3A OR 3B CKD (CMD): ICD-10-CM

## 2022-09-23 DIAGNOSIS — R53.81 DEBILITY: ICD-10-CM

## 2022-09-23 DIAGNOSIS — D72.829 LEUKOCYTOSIS, UNSPECIFIED TYPE: Primary | ICD-10-CM

## 2022-09-23 DIAGNOSIS — Z86.73 HISTORY OF STROKE: ICD-10-CM

## 2022-09-23 DIAGNOSIS — I10 HYPERTENSION, BENIGN: ICD-10-CM

## 2022-09-23 DIAGNOSIS — E11.69 HYPERLIPIDEMIA ASSOCIATED WITH TYPE 2 DIABETES MELLITUS (CMD): ICD-10-CM

## 2022-09-23 PROCEDURE — 99310 SBSQ NF CARE HIGH MDM 45: CPT | Performed by: FAMILY MEDICINE

## 2022-09-23 ASSESSMENT — ENCOUNTER SYMPTOMS
FEVER: 0
EYE REDNESS: 0
AGITATION: 0
POLYPHAGIA: 0
WHEEZING: 0
WEAKNESS: 1
COLOR CHANGE: 0
NAUSEA: 0
BACK PAIN: 0
SINUS PRESSURE: 0
DIZZINESS: 0
CONSTIPATION: 0
CHILLS: 0
SORE THROAT: 0
PHOTOPHOBIA: 0
SHORTNESS OF BREATH: 0
DIARRHEA: 0
HEADACHES: 0
SLEEP DISTURBANCE: 0
RHINORRHEA: 0
ACTIVITY CHANGE: 1
POLYDIPSIA: 0
LIGHT-HEADEDNESS: 0
NERVOUS/ANXIOUS: 0
VOMITING: 0
SINUS PAIN: 0

## 2022-09-23 NOTE — TELEPHONE ENCOUNTER
Patient needs a referral to urologist  For Urinary retention  Referral placed for dr. Cassie Muller would like a call back once referral is authorized with provider info  Dr. Angel Stark [phone]  Referral pending

## 2022-09-24 ASSESSMENT — ENCOUNTER SYMPTOMS
SHORTNESS OF BREATH: 0
NERVOUS/ANXIOUS: 0
ACTIVITY CHANGE: 1
RHINORRHEA: 0
DIARRHEA: 0
FEVER: 0
HEADACHES: 0
NAUSEA: 0
BACK PAIN: 0
VOMITING: 0
SLEEP DISTURBANCE: 0
WHEEZING: 0
CHILLS: 0
SORE THROAT: 0
LIGHT-HEADEDNESS: 0
WEAKNESS: 1
CONSTIPATION: 0
AGITATION: 0
SINUS PRESSURE: 0
COLOR CHANGE: 0
DIZZINESS: 0
SINUS PAIN: 0

## 2022-09-26 ENCOUNTER — NURSING HOME VISIT (OUTPATIENT)
Dept: POST ACUTE CARE | Age: 77
End: 2022-09-26

## 2022-09-26 VITALS
DIASTOLIC BLOOD PRESSURE: 84 MMHG | HEART RATE: 85 BPM | TEMPERATURE: 97.3 F | RESPIRATION RATE: 19 BRPM | SYSTOLIC BLOOD PRESSURE: 154 MMHG | OXYGEN SATURATION: 96 %

## 2022-09-26 DIAGNOSIS — Z79.01 CHRONIC ANTICOAGULATION: ICD-10-CM

## 2022-09-26 DIAGNOSIS — N18.30 TYPE 2 DIABETES MELLITUS WITH STAGE 3 CHRONIC KIDNEY DISEASE, WITH LONG-TERM CURRENT USE OF INSULIN, UNSPECIFIED WHETHER STAGE 3A OR 3B CKD (CMD): ICD-10-CM

## 2022-09-26 DIAGNOSIS — I10 HYPERTENSION, BENIGN: ICD-10-CM

## 2022-09-26 DIAGNOSIS — I25.10 CORONARY ARTERY DISEASE INVOLVING NATIVE CORONARY ARTERY OF NATIVE HEART WITHOUT ANGINA PECTORIS: ICD-10-CM

## 2022-09-26 DIAGNOSIS — R53.81 DEBILITY: Primary | ICD-10-CM

## 2022-09-26 DIAGNOSIS — R31.0 HEMATURIA, GROSS: ICD-10-CM

## 2022-09-26 DIAGNOSIS — Z86.73 HISTORY OF STROKE: ICD-10-CM

## 2022-09-26 DIAGNOSIS — E11.22 TYPE 2 DIABETES MELLITUS WITH STAGE 3 CHRONIC KIDNEY DISEASE, WITH LONG-TERM CURRENT USE OF INSULIN, UNSPECIFIED WHETHER STAGE 3A OR 3B CKD (CMD): ICD-10-CM

## 2022-09-26 DIAGNOSIS — E03.8 OTHER SPECIFIED HYPOTHYROIDISM: ICD-10-CM

## 2022-09-26 DIAGNOSIS — N18.30 STAGE 3 CHRONIC KIDNEY DISEASE, UNSPECIFIED WHETHER STAGE 3A OR 3B CKD (CMD): ICD-10-CM

## 2022-09-26 DIAGNOSIS — I48.19 PERSISTENT ATRIAL FIBRILLATION (CMD): ICD-10-CM

## 2022-09-26 DIAGNOSIS — Z79.4 TYPE 2 DIABETES MELLITUS WITH STAGE 3 CHRONIC KIDNEY DISEASE, WITH LONG-TERM CURRENT USE OF INSULIN, UNSPECIFIED WHETHER STAGE 3A OR 3B CKD (CMD): ICD-10-CM

## 2022-09-26 PROBLEM — R19.7 ACUTE DIARRHEA: Status: RESOLVED | Noted: 2022-09-20 | Resolved: 2022-09-26

## 2022-09-26 PROBLEM — D72.829 LEUKOCYTOSIS: Status: RESOLVED | Noted: 2022-08-29 | Resolved: 2022-09-26

## 2022-09-26 PROCEDURE — 99316 NF DSCHRG MGMT 30 MIN+: CPT | Performed by: FAMILY MEDICINE

## 2022-09-26 ASSESSMENT — ENCOUNTER SYMPTOMS
SINUS PAIN: 0
LIGHT-HEADEDNESS: 0
DIZZINESS: 0
ACTIVITY CHANGE: 1
BACK PAIN: 0
RHINORRHEA: 0
POLYDIPSIA: 0
SORE THROAT: 0
HEADACHES: 0
NERVOUS/ANXIOUS: 0
WHEEZING: 0
COLOR CHANGE: 0
SLEEP DISTURBANCE: 0
VOMITING: 0
SHORTNESS OF BREATH: 0
POLYPHAGIA: 0
CHILLS: 0
NAUSEA: 0
FEVER: 0
DIARRHEA: 0
EYE REDNESS: 0
PHOTOPHOBIA: 0
WEAKNESS: 1
SINUS PRESSURE: 0
AGITATION: 0
CONSTIPATION: 0

## 2022-09-27 ENCOUNTER — TELEPHONE (OUTPATIENT)
Dept: FAMILY MEDICINE CLINIC | Facility: CLINIC | Age: 77
End: 2022-09-27

## 2022-09-27 RX ORDER — METOPROLOL SUCCINATE 25 MG/1
TABLET, EXTENDED RELEASE ORAL
Qty: 90 TABLET | Refills: 0 | Status: SHIPPED | OUTPATIENT
Start: 2022-09-27

## 2022-09-27 RX ORDER — ATORVASTATIN CALCIUM 20 MG/1
TABLET, FILM COATED ORAL
Qty: 90 TABLET | Refills: 0 | Status: SHIPPED | OUTPATIENT
Start: 2022-09-27

## 2022-09-27 RX ORDER — DILTIAZEM HYDROCHLORIDE 180 MG/1
180 CAPSULE, COATED, EXTENDED RELEASE ORAL DAILY
Qty: 90 CAPSULE | Refills: 0 | Status: SHIPPED | OUTPATIENT
Start: 2022-09-27 | End: 2023-02-03

## 2022-09-27 NOTE — TELEPHONE ENCOUNTER
Pt is being discharged from Nursing home  tomorrow, [address] would like to start care on the 9/29,  would like to know if dr Jonh Villafuerte will follow pt.      Lornavito Johnson @ [facility] care  [phone]  Just needs a yes or no answer, Can leave message on vm

## 2022-09-27 NOTE — TELEPHONE ENCOUNTER
Advised yes Dr Seferino Casanova will follow  Patient already home, they are going out on the 29th to see him and do labs

## 2022-09-28 ENCOUNTER — TELEPHONE (OUTPATIENT)
Dept: FAMILY MEDICINE CLINIC | Facility: CLINIC | Age: 77
End: 2022-09-28

## 2022-09-28 ENCOUNTER — PATIENT OUTREACH (OUTPATIENT)
Dept: CASE MANAGEMENT | Age: 77
End: 2022-09-28

## 2022-09-28 DIAGNOSIS — Z02.9 ENCOUNTERS FOR ADMINISTRATIVE PURPOSE: ICD-10-CM

## 2022-09-28 PROCEDURE — 1111F DSCHRG MED/CURRENT MED MERGE: CPT

## 2022-09-28 RX ORDER — MELATONIN
3 NIGHTLY
COMMUNITY

## 2022-09-28 RX ORDER — SENNOSIDES 8.6 MG
8.6 TABLET ORAL 2 TIMES DAILY
COMMUNITY

## 2022-09-28 NOTE — TELEPHONE ENCOUNTER
LMTCB. Per verbal from DB she will sign these orders. Do they need anything on our end regarding the need for social work?

## 2022-09-28 NOTE — TELEPHONE ENCOUNTER
CAROLYN RN FROM [address] [place] [address]. IS SEEING THIS PATIENT NEEDS , PATIENT REFUSED SPEECH THERAPY  AND IS DR LYNN OK TO SIGN ORDERS?

## 2022-09-30 ENCOUNTER — LAB REQUISITION (OUTPATIENT)
Dept: LAB | Age: 77
End: 2022-09-30

## 2022-09-30 DIAGNOSIS — N17.9 ACUTE KIDNEY FAILURE, UNSPECIFIED (CMD): ICD-10-CM

## 2022-09-30 DIAGNOSIS — N30.00 ACUTE CYSTITIS WITHOUT HEMATURIA: ICD-10-CM

## 2022-09-30 LAB
ALBUMIN SERPL-MCNC: 2.5 G/DL (ref 3.6–5.1)
ALBUMIN/GLOB SERPL: 0.7 {RATIO} (ref 1–2.4)
ALP SERPL-CCNC: 114 UNITS/L (ref 45–117)
ALT SERPL-CCNC: 26 UNITS/L
ANION GAP SERPL CALC-SCNC: 13 MMOL/L (ref 7–19)
AST SERPL-CCNC: 19 UNITS/L
BASOPHILS # BLD: 0.1 K/MCL (ref 0–0.3)
BASOPHILS NFR BLD: 1 %
BILIRUB SERPL-MCNC: 0.9 MG/DL (ref 0.2–1)
BUN SERPL-MCNC: 30 MG/DL (ref 6–20)
BUN/CREAT SERPL: 22 (ref 7–25)
CALCIUM SERPL-MCNC: 8.6 MG/DL (ref 8.4–10.2)
CHLORIDE SERPL-SCNC: 111 MMOL/L (ref 97–110)
CO2 SERPL-SCNC: 23 MMOL/L (ref 21–32)
CREAT SERPL-MCNC: 1.37 MG/DL (ref 0.67–1.17)
DEPRECATED RDW RBC: 61.1 FL (ref 39–50)
EOSINOPHIL # BLD: 1.2 K/MCL (ref 0–0.5)
EOSINOPHIL NFR BLD: 10 %
ERYTHROCYTE [DISTWIDTH] IN BLOOD: 15.5 % (ref 11–15)
FASTING DURATION TIME PATIENT: ABNORMAL H
GFR SERPLBLD BASED ON 1.73 SQ M-ARVRAT: 52 ML/MIN
GLOBULIN SER-MCNC: 3.4 G/DL (ref 2–4)
GLUCOSE SERPL-MCNC: 208 MG/DL (ref 70–99)
HCT VFR BLD CALC: 29.8 % (ref 39–51)
HGB BLD-MCNC: 9.3 G/DL (ref 13–17)
IMM GRANULOCYTES # BLD AUTO: 0.2 K/MCL (ref 0–0.2)
IMM GRANULOCYTES # BLD: 2 %
LYMPHOCYTES # BLD: 1.3 K/MCL (ref 1–4)
LYMPHOCYTES NFR BLD: 11 %
MCH RBC QN AUTO: 33.2 PG (ref 26–34)
MCHC RBC AUTO-ENTMCNC: 31.2 G/DL (ref 32–36.5)
MCV RBC AUTO: 106.4 FL (ref 78–100)
MONOCYTES # BLD: 0.8 K/MCL (ref 0.3–0.9)
MONOCYTES NFR BLD: 7 %
NEUTROPHILS # BLD: 8.4 K/MCL (ref 1.8–7.7)
NEUTROPHILS NFR BLD: 69 %
NRBC BLD MANUAL-RTO: 0 /100 WBC
PLATELET # BLD AUTO: 261 K/MCL (ref 140–450)
POTASSIUM SERPL-SCNC: 3.9 MMOL/L (ref 3.4–5.1)
PROT SERPL-MCNC: 5.9 G/DL (ref 6.4–8.2)
RBC # BLD: 2.8 MIL/MCL (ref 4.5–5.9)
SODIUM SERPL-SCNC: 143 MMOL/L (ref 135–145)
WBC # BLD: 12 K/MCL (ref 4.2–11)

## 2022-09-30 PROCEDURE — 80053 COMPREHEN METABOLIC PANEL: CPT | Performed by: CLINICAL MEDICAL LABORATORY

## 2022-09-30 PROCEDURE — 85025 COMPLETE CBC W/AUTO DIFF WBC: CPT | Performed by: CLINICAL MEDICAL LABORATORY

## 2022-10-10 ENCOUNTER — TELEPHONE (OUTPATIENT)
Dept: FAMILY MEDICINE CLINIC | Facility: CLINIC | Age: 77
End: 2022-10-10

## 2022-10-10 RX ORDER — FLASH GLUCOSE SCANNING READER
1 EACH MISCELLANEOUS
Qty: 1 EACH | Refills: 0 | Status: SHIPPED | OUTPATIENT
Start: 2022-10-10

## 2022-10-10 RX ORDER — FLASH GLUCOSE SCANNING READER
1 EACH MISCELLANEOUS
Qty: 6 EACH | Refills: 0 | Status: SHIPPED | OUTPATIENT
Start: 2022-10-10

## 2022-10-10 RX ORDER — FLASH GLUCOSE SENSOR
1 KIT MISCELLANEOUS
Qty: 6 EACH | Refills: 0 | Status: SHIPPED | OUTPATIENT
Start: 2022-10-10

## 2022-10-10 NOTE — TELEPHONE ENCOUNTER
Per verbal from DB, order a continuous glucose monitor for pt. It looks like we have sent Dexcom in the past for him. Freestyle Alex system sent. Unsure of coverage.

## 2022-10-19 ENCOUNTER — OFFICE VISIT (OUTPATIENT)
Dept: FAMILY MEDICINE CLINIC | Facility: CLINIC | Age: 77
End: 2022-10-19
Payer: MEDICARE

## 2022-10-19 DIAGNOSIS — E11.22 DIABETES MELLITUS WITH STAGE 3 CHRONIC KIDNEY DISEASE (HCC): ICD-10-CM

## 2022-10-19 DIAGNOSIS — I15.2 HYPERTENSION ASSOCIATED WITH DIABETES (HCC): ICD-10-CM

## 2022-10-19 DIAGNOSIS — E11.69 HYPERLIPIDEMIA ASSOCIATED WITH TYPE 2 DIABETES MELLITUS (HCC): Primary | ICD-10-CM

## 2022-10-19 DIAGNOSIS — N18.30 DIABETES MELLITUS WITH STAGE 3 CHRONIC KIDNEY DISEASE (HCC): ICD-10-CM

## 2022-10-19 DIAGNOSIS — E78.5 HYPERLIPIDEMIA ASSOCIATED WITH TYPE 2 DIABETES MELLITUS (HCC): Primary | ICD-10-CM

## 2022-10-19 DIAGNOSIS — E11.59 HYPERTENSION ASSOCIATED WITH DIABETES (HCC): ICD-10-CM

## 2022-10-19 LAB
APPEARANCE: CLEAR
BILIRUBIN: NEGATIVE
GLUCOSE (URINE DIPSTICK): 500 MG/DL
KETONES (URINE DIPSTICK): NEGATIVE MG/DL
MULTISTIX LOT#: ABNORMAL NUMERIC
NITRITE, URINE: NEGATIVE
PH, URINE: 5 (ref 4.5–8)
PROTEIN (URINE DIPSTICK): 30 MG/DL
SPECIFIC GRAVITY: 1.02 (ref 1–1.03)
URINE-COLOR: YELLOW
UROBILINOGEN,SEMI-QN: 0.2 MG/DL (ref 0–1.9)

## 2022-10-19 PROCEDURE — 99214 OFFICE O/P EST MOD 30 MIN: CPT | Performed by: FAMILY MEDICINE

## 2022-10-19 PROCEDURE — 81003 URINALYSIS AUTO W/O SCOPE: CPT | Performed by: FAMILY MEDICINE

## 2022-10-19 PROCEDURE — 3008F BODY MASS INDEX DOCD: CPT | Performed by: FAMILY MEDICINE

## 2022-10-19 PROCEDURE — 87077 CULTURE AEROBIC IDENTIFY: CPT | Performed by: FAMILY MEDICINE

## 2022-10-19 PROCEDURE — 3074F SYST BP LT 130 MM HG: CPT | Performed by: FAMILY MEDICINE

## 2022-10-19 PROCEDURE — 87086 URINE CULTURE/COLONY COUNT: CPT | Performed by: FAMILY MEDICINE

## 2022-10-19 PROCEDURE — 3078F DIAST BP <80 MM HG: CPT | Performed by: FAMILY MEDICINE

## 2022-10-19 RX ORDER — DILTIAZEM HYDROCHLORIDE 180 MG/1
180 CAPSULE, COATED, EXTENDED RELEASE ORAL DAILY
Qty: 90 CAPSULE | Refills: 0 | Status: CANCELLED | OUTPATIENT
Start: 2022-10-19

## 2022-10-21 ENCOUNTER — TELEPHONE (OUTPATIENT)
Dept: FAMILY MEDICINE CLINIC | Facility: CLINIC | Age: 77
End: 2022-10-21

## 2022-10-21 VITALS
RESPIRATION RATE: 18 BRPM | DIASTOLIC BLOOD PRESSURE: 66 MMHG | OXYGEN SATURATION: 98 % | SYSTOLIC BLOOD PRESSURE: 94 MMHG | TEMPERATURE: 96 F | HEART RATE: 107 BPM

## 2022-10-21 DIAGNOSIS — G82.20 PARAPLEGIA (HCC): ICD-10-CM

## 2022-10-21 DIAGNOSIS — I63.9 CEREBROVASCULAR ACCIDENT (CVA), UNSPECIFIED MECHANISM (HCC): Primary | ICD-10-CM

## 2022-10-21 DIAGNOSIS — L89.91 HEALING PRESSURE INJURY, STAGE 1: ICD-10-CM

## 2022-10-21 RX ORDER — FLASH GLUCOSE SENSOR
1 KIT MISCELLANEOUS
Qty: 2 EACH | Refills: 1 | Status: SHIPPED | OUTPATIENT
Start: 2022-10-21

## 2022-10-21 RX ORDER — FLASH GLUCOSE SCANNING READER
1 EACH MISCELLANEOUS ONCE
Qty: 1 EACH | Refills: 0 | Status: SHIPPED | OUTPATIENT
Start: 2022-10-21 | End: 2022-10-21

## 2022-10-21 NOTE — TELEPHONE ENCOUNTER
Terrance with [facility] called said he was supposed to discharge pt today but found a stage one pressure sore on his right bottom. So is requesting to extension to see pt once a week for two weeks. Per terrance he is requesting a zinc paste for bottom. And would like an order to de placed for a boho cushion as pt is in wheel chair all day.

## 2022-10-21 NOTE — TELEPHONE ENCOUNTER
DAVIDA order signed by Dr Hanna Davey and faxed to [address]: [phone]  Zinc sent to [facility] by Dr. Mel Alpers [facility] notified and verbalized understanding

## 2022-10-21 NOTE — TELEPHONE ENCOUNTER
Gave verbal order to extend nursing care for pressure ulcer care  Terrance requesting Zinc paste to be sent to patient's pharmacy  Also requesting a nawafo cushion order to be sent to [address] [place] [address] in [place]

## 2022-10-21 NOTE — TELEPHONE ENCOUNTER
Received fax from [facility] 14 day no longer made. Rx request for kirstin 2 sensor and reader recommended. rx sent.

## 2022-10-24 ENCOUNTER — TELEPHONE (OUTPATIENT)
Dept: FAMILY MEDICINE CLINIC | Facility: CLINIC | Age: 77
End: 2022-10-24

## 2022-10-24 NOTE — TELEPHONE ENCOUNTER
Form corrected and faxed back
Paperwork for patient shoes faxed to [facility]  [phone]  Original sent to scanning  Copy in scan pending.
Patient's daughter FMLA paperwork ready to fax  [facility] notified and faxed to [phone]  Copy in scan pending folder  Original sent to scans
received a call from [facility] they received the form for diabetic inserts, the date that was put on the form for Pt's last Diabetic exam was 6/18/18. This is an incorrect date,  Needs to be changed to 10/19/22, since this is the date the pt was seen. [facility] faxing the form back.        Fax [phone]  .  [phone]
the office with any questions/problems/concerns. Other comorbidity noted and will be managed by PCP. The lesion left and right was partially debrided and silver nitrate was applied with an apperature pad under occlusion. The patient will leave in place for 24-48 hours and than remove. The patient tolerated the procedure well and without complication. Pt will follow up in 6-8 weeks       No orders of the defined types were placed in this encounter.     Orders Placed This Encounter   Procedures    25076 - [place] DESTRUCTION BENIGN LESIONS UP TO 14

## 2022-10-24 NOTE — TELEPHONE ENCOUNTER
Corrected [facility] form to reflect date of last OV for diabetes 10/19/22.  Faxed back to [employer]

## 2022-10-28 ENCOUNTER — TELEPHONE (OUTPATIENT)
Dept: FAMILY MEDICINE CLINIC | Facility: CLINIC | Age: 77
End: 2022-10-28

## 2022-10-28 NOTE — TELEPHONE ENCOUNTER
FYI  Call from [facility] health nurse for patient  Patient has new pressure ulcer on buttock  No drainage  Terrance requesting verbal order for medihoney with foam dressing.   Verbal order given

## 2022-10-28 NOTE — TELEPHONE ENCOUNTER
CAROLYN FROM [facility] CALLING. PATIENT HAS A NEW PRESSURE SORE ON BUTTOCKS AND HE IS ASKING FOR AN ORDER FOR MEDICAL HONEY( [address]) PLEASE.

## 2022-11-02 ENCOUNTER — MED REC SCAN ONLY (OUTPATIENT)
Dept: FAMILY MEDICINE CLINIC | Facility: CLINIC | Age: 77
End: 2022-11-02

## 2022-11-02 ENCOUNTER — TELEPHONE (OUTPATIENT)
Dept: FAMILY MEDICINE CLINIC | Facility: CLINIC | Age: 77
End: 2022-11-02

## 2022-11-02 NOTE — TELEPHONE ENCOUNTER
Called Gael to see if [facility] cushion order was received. Was faxed on 10/21/22  They are closed today and Friday this week.   Resent order just in case    [facility]  [address] [facility] [address] [facility], [address] [place] [address]  Phone Number [phone]  Faxed order to [facility] at [phone]

## 2022-11-02 NOTE — TELEPHONE ENCOUNTER
Pt -iselaughjennifer Best called regarding DAVIDA Wynn  See phone note from 10/21/22. Antonina Best states the other supplies were delivered and would like to status of the [employer].

## 2022-11-08 ENCOUNTER — TELEPHONE (OUTPATIENT)
Dept: FAMILY MEDICINE CLINIC | Facility: CLINIC | Age: 77
End: 2022-11-08

## 2022-11-08 NOTE — TELEPHONE ENCOUNTER
Pt granddaughter called said pt wasn't able to get free style liber from [facility]. she called ins and was told to go through [address] in [place] [address] per Tori they should be calling us as they asked for our number.  She just wanted to give us a heads up

## 2022-11-16 ENCOUNTER — TELEPHONE (OUTPATIENT)
Dept: FAMILY MEDICINE CLINIC | Facility: CLINIC | Age: 77
End: 2022-11-16

## 2022-11-16 NOTE — TELEPHONE ENCOUNTER
SAMI IS CALLING TO LET  KNOW THAT PAPERWORK WAS SENT SO HE CAN GET HIS GLUCOSE MONITOR  PLEASE FILL OUT AND RETURN BACK    [facility] MEDICAL

## 2022-11-21 ENCOUNTER — TELEPHONE (OUTPATIENT)
Dept: FAMILY MEDICINE CLINIC | Facility: CLINIC | Age: 77
End: 2022-11-21

## 2022-11-23 ENCOUNTER — TELEPHONE (OUTPATIENT)
Dept: FAMILY MEDICINE CLINIC | Facility: CLINIC | Age: 77
End: 2022-11-23

## 2022-11-23 NOTE — TELEPHONE ENCOUNTER
[facility] Medical will be faxing the Physician Work Order back, the form was not completed  Question 4 was left incomplete  Add dx, initial and sign and date the form again.     Any questions Ph. [phone]

## 2022-11-29 ENCOUNTER — TELEPHONE (OUTPATIENT)
Dept: FAMILY MEDICINE CLINIC | Facility: CLINIC | Age: 77
End: 2022-11-29

## 2022-11-29 NOTE — TELEPHONE ENCOUNTER
Ada @ [address] a Plan of Care for  to view and sign. Dameon Kunz states this was sent in October, they have not received it back,  They are sending it again.       Ph. [phone]

## 2022-12-01 ENCOUNTER — TELEPHONE (OUTPATIENT)
Dept: FAMILY MEDICINE CLINIC | Facility: CLINIC | Age: 77
End: 2022-12-01

## 2022-12-01 NOTE — TELEPHONE ENCOUNTER
Pt daughter called, states Pt has high /90, pulse 113, Pt's niece called and said they talked to Dr Sandrita Sinclair last night, and Dr said he should be seen. (no Chart Notes)      Also, Daughter said pt is being difficult and he doesn't want to eat or drink and short tempered,   Thinks he could have a UTI,  Pt has a history of UTI's, would like to bring urine in to have it tested. Informed Daughter we do not have a Dr in today.

## 2022-12-01 NOTE — TELEPHONE ENCOUNTER
BP: 151/80  P: 94  Denies headache  Dizzy this morning but has resolved  Denies chest pain  Temp 100.9- but patient wearing a hat  Denies painful urination  Denies frequency  Denies nausea/vomiting  Denies flank/abdominal pain  Temperament has changed - quick tempered  Patient not eating well or drinking much - complaints of no appetite    Per verbal Dr. Rogers Jacob have daughter give patient mucinex for temp and make sure he is hydrated  If symptoms worsen bring patient to IC or ER for eval  BP follow up appointment scheduled.   Future Appointments   Date Time Provider Mariluz Sapp   12/7/2022 11:00 AM Mal Dickey MD [facility]

## 2022-12-01 NOTE — TELEPHONE ENCOUNTER
cancellation tomorrow  Patient rescheduled  Patient's daughter advised for new or worsening symptoms to go to IC or ER prior to appointment.     Future Appointments   Date Time Provider Mariluz Sapp   12/2/2022  2:15 PM Mika Griffiths MD [facility]

## 2022-12-01 NOTE — TELEPHONE ENCOUNTER
RADHA  Spoke with patient's daughter anatoly - per daughter patients behavior changing rapidly   Patient easily angered  Breathing has become more labored  Advised to bring to ER for eval  Per Anatoly patient refusing to go - advised to call ambulance if need be. Anatoly verbalized understanding.

## 2022-12-02 ENCOUNTER — TELEMEDICINE (OUTPATIENT)
Dept: FAMILY MEDICINE CLINIC | Facility: CLINIC | Age: 77
End: 2022-12-02
Payer: MEDICARE

## 2022-12-02 DIAGNOSIS — R50.9 FEVER, UNSPECIFIED FEVER CAUSE: Primary | ICD-10-CM

## 2022-12-02 DIAGNOSIS — N30.01 ACUTE CYSTITIS WITH HEMATURIA: ICD-10-CM

## 2022-12-02 LAB
APPEARANCE: CLEAR
GLUCOSE (URINE DIPSTICK): >=1000 MG/DL
KETONES (URINE DIPSTICK): NEGATIVE MG/DL
MULTISTIX LOT#: ABNORMAL NUMERIC
NITRITE, URINE: POSITIVE
PH, URINE: 5.5 (ref 4.5–8)
PROTEIN (URINE DIPSTICK): 30 MG/DL
SPECIFIC GRAVITY: 1.02 (ref 1–1.03)
URINE-COLOR: YELLOW
UROBILINOGEN,SEMI-QN: 0.2 MG/DL (ref 0–1.9)

## 2022-12-02 PROCEDURE — 87077 CULTURE AEROBIC IDENTIFY: CPT | Performed by: FAMILY MEDICINE

## 2022-12-02 PROCEDURE — 87086 URINE CULTURE/COLONY COUNT: CPT | Performed by: FAMILY MEDICINE

## 2022-12-02 PROCEDURE — 81003 URINALYSIS AUTO W/O SCOPE: CPT | Performed by: FAMILY MEDICINE

## 2022-12-02 PROCEDURE — 99213 OFFICE O/P EST LOW 20 MIN: CPT | Performed by: FAMILY MEDICINE

## 2022-12-02 RX ORDER — NITROFURANTOIN 25; 75 MG/1; MG/1
100 CAPSULE ORAL 2 TIMES DAILY
Qty: 10 CAPSULE | Refills: 0 | Status: SHIPPED | OUTPATIENT
Start: 2022-12-02 | End: 2022-12-07

## 2022-12-02 NOTE — TELEPHONE ENCOUNTER
[facility] medical called said they received the fax but no diagnostic codes. Per Sara they faxed it back to on 11/29 and it had to be corrected and faxed back.  [phone]

## 2022-12-09 ENCOUNTER — TELEPHONE (OUTPATIENT)
Dept: FAMILY MEDICINE CLINIC | Facility: CLINIC | Age: 77
End: 2022-12-09

## 2022-12-09 ENCOUNTER — PATIENT MESSAGE (OUTPATIENT)
Dept: FAMILY MEDICINE CLINIC | Facility: CLINIC | Age: 77
End: 2022-12-09

## 2022-12-09 DIAGNOSIS — L89.91 HEALING PRESSURE INJURY, STAGE 1: ICD-10-CM

## 2022-12-09 DIAGNOSIS — G82.20 PARAPLEGIA (HCC): Primary | ICD-10-CM

## 2022-12-09 DIAGNOSIS — L89.309 PRESSURE INJURY OF SKIN OF BUTTOCK, UNSPECIFIED INJURY STAGE, UNSPECIFIED LATERALITY: Primary | ICD-10-CM

## 2022-12-09 NOTE — TELEPHONE ENCOUNTER
Patients BP high this morning 190/110  Current BP: 160/110 - laying down (kenneth just gave another 10 mg of lisinopril)  Occasional dizziness but not currently  Denies fever  Patient has chills  Patient just finished his antibiotic course on Wednesday for UTI  Patient has two sores on his bottom  Sores are bleeding  Red/swelling around sites  No odor  No pus   Kyle Jones will try to take a picture and send it to us via [employer] for Dr Matilda Hanson review.

## 2022-12-09 NOTE — TELEPHONE ENCOUNTER
From: Christianne Phan  To: Antonia Ruggiero MD  Sent: 12/9/2022 1:56 PM CST  Subject: Rehana Custer    Pressure wound

## 2022-12-09 NOTE — TELEPHONE ENCOUNTER
Pt daughter would like to signup pt  for at home care and he is paralyzed.  Would like to know how to go about doing that

## 2022-12-09 NOTE — TELEPHONE ENCOUNTER
Home health order printed and faced with face sheet, insurance card, med list and last OV notes to [facility] intake  at [address] also states Efren Lyle has not given patient the [facility] cushion for wheel chair  Contacted Efren Lyle they requested we re-fax patient's order with insurance card. Order printed and refaxed. [phone]  Cushion is around 90 dollars at [address] - family notified  Advised to use cushion and keep areas clean and dry.   Tori and Genoveva notified and verbalized understanding - they will follow up with home health and [facility] LIBBY VELAZQUEZ

## 2022-12-09 NOTE — TELEPHONE ENCOUNTER
See [facility] for pic  Previous home health nurse discharged patient from home health services   Family would like to re-start due to pressure wounds  Order pended okay to refer?

## 2022-12-12 ENCOUNTER — MED REC SCAN ONLY (OUTPATIENT)
Dept: FAMILY MEDICINE CLINIC | Facility: CLINIC | Age: 77
End: 2022-12-12

## 2022-12-13 ENCOUNTER — TELEPHONE (OUTPATIENT)
Dept: FAMILY MEDICINE CLINIC | Facility: CLINIC | Age: 77
End: 2022-12-13

## 2022-12-13 NOTE — TELEPHONE ENCOUNTER
Received fax from [address] [place] [address] requesting a continuous glucose monitor. States they received OV notes that do not specify how many times a day patient receives insulin.    States for CGM coverage: frequency of insulin use must be proven by a concrete number and documented in [address] notes in order to be covered  Medical requirement of insulin use 3x daily    Notes states lantus 40 u nightly and humalog sliding scale    Requesting addendum in OV notes if patient meets this requirement    Spoke with patient's daughter Debbie Manzano and she advised that patient uses insulin on average 4x daily    Fax info to [phone]    Please advise

## 2022-12-16 NOTE — TELEPHONE ENCOUNTER
[facility] medical called wanted to know if DR. Roxy Melton has done the addendum for the continuous glucose monitor.

## 2022-12-19 ENCOUNTER — TELEPHONE (OUTPATIENT)
Dept: FAMILY MEDICINE CLINIC | Facility: CLINIC | Age: 77
End: 2022-12-19

## 2022-12-19 ENCOUNTER — MED REC SCAN ONLY (OUTPATIENT)
Dept: FAMILY MEDICINE CLINIC | Facility: CLINIC | Age: 77
End: 2022-12-19

## 2022-12-19 NOTE — TELEPHONE ENCOUNTER
Tori Kinney with [facility] called. Said she would like a call to talk about referral rhey received for pt. Wanted to know if he has been seen since he was discharged on 11/18. Per Tori Kinney they need most recent progress notes.

## 2022-12-19 NOTE — TELEPHONE ENCOUNTER
Called and spoke to Kassidy Marie most recent progess note faxed to them indicated why patient needs home health  Could you addend your note from 12/2/22?     Fax [phone]

## 2022-12-20 ENCOUNTER — TELEPHONE (OUTPATIENT)
Dept: FAMILY MEDICINE CLINIC | Facility: CLINIC | Age: 77
End: 2022-12-20

## 2022-12-20 DIAGNOSIS — E66.01 SEVERE OBESITY (BMI 35.0-39.9) WITH COMORBIDITY (HCC): ICD-10-CM

## 2022-12-20 DIAGNOSIS — N18.30 STAGE 3 CHRONIC KIDNEY DISEASE, UNSPECIFIED WHETHER STAGE 3A OR 3B CKD (HCC): ICD-10-CM

## 2022-12-20 DIAGNOSIS — I48.91 ATRIAL FIBRILLATION WITH RAPID VENTRICULAR RESPONSE (HCC): ICD-10-CM

## 2022-12-20 DIAGNOSIS — G82.20 PARAPLEGIA (HCC): Primary | ICD-10-CM

## 2022-12-20 NOTE — TELEPHONE ENCOUNTER
He has mobilty issues, so asked HH to eval to see if he would benefit from any services such as PT or OT.    Last appt was telemed appt so no exam avail

## 2022-12-20 NOTE — TELEPHONE ENCOUNTER
Received fax back from [facility].  Requesting order for PT and OT  Referral placed and faxed to [phone]

## 2022-12-20 NOTE — TELEPHONE ENCOUNTER
Jareth Mccann @ [facility] received  Progress notes,  But not an order   will need an order for PT, OT or Both.    Fax # [phone]  Ph. [phone]

## 2022-12-21 ENCOUNTER — TELEPHONE (OUTPATIENT)
Dept: FAMILY MEDICINE CLINIC | Facility: CLINIC | Age: 77
End: 2022-12-21

## 2022-12-21 NOTE — TELEPHONE ENCOUNTER
Jayesh Mendiola @ [address] [place] [address] did not receive the Letter that needed to be addended  Please refax [phone]  Ph. [phone]   See phone note from 12/13/22

## 2022-12-21 NOTE — TELEPHONE ENCOUNTER
Home health States they needed updated clinical note to state that patient uses insulin 4x daily (this is what daughter Trixie Simon told me)  Last VV 12/2/22  Last OV 10/19/22  Does he need another visit?

## 2023-01-01 ENCOUNTER — MOBILE ENCOUNTER (OUTPATIENT)
Dept: FAMILY MEDICINE CLINIC | Facility: CLINIC | Age: 78
End: 2023-01-01

## 2023-01-01 DIAGNOSIS — F44.89 CONFUSION STATE: ICD-10-CM

## 2023-01-01 DIAGNOSIS — R30.0 DYSURIA: Primary | ICD-10-CM

## 2023-01-01 RX ORDER — CIPROFLOXACIN 500 MG/1
500 TABLET, FILM COATED ORAL 2 TIMES DAILY
Qty: 14 TABLET | Refills: 0 | Status: SHIPPED | OUTPATIENT
Start: 2023-01-01 | End: 2023-01-08

## 2023-01-02 ENCOUNTER — LAB ENCOUNTER (OUTPATIENT)
Dept: LAB | Age: 78
End: 2023-01-02
Attending: FAMILY MEDICINE
Payer: MEDICARE

## 2023-01-02 DIAGNOSIS — R30.0 DYSURIA: ICD-10-CM

## 2023-01-02 DIAGNOSIS — F44.89 CONFUSION STATE: ICD-10-CM

## 2023-01-05 ENCOUNTER — MED REC SCAN ONLY (OUTPATIENT)
Dept: FAMILY MEDICINE CLINIC | Facility: CLINIC | Age: 78
End: 2023-01-05

## 2023-01-05 ENCOUNTER — TELEPHONE (OUTPATIENT)
Dept: FAMILY MEDICINE CLINIC | Facility: CLINIC | Age: 78
End: 2023-01-05

## 2023-01-05 NOTE — TELEPHONE ENCOUNTER
No orders for urine testing  Called patient's granddaughter Kathie Means  No current symptoms of UTI  Patient started CIPRO 4 days ago - prescribed by Dr. Mica Khalil  Do you want to run a dip and culture or check urine after antibiotic complete to verify infection cleared?

## 2023-01-05 NOTE — TELEPHONE ENCOUNTER
Per verbal dr. Marcie Meng we will retest urine 1 week after antibiotics finished. Patient's family advised and verbalized understanding.   Urine specimen discarded

## 2023-01-13 ENCOUNTER — TELEPHONE (OUTPATIENT)
Dept: FAMILY MEDICINE CLINIC | Facility: CLINIC | Age: 78
End: 2023-01-13

## 2023-01-13 ENCOUNTER — NURSE ONLY (OUTPATIENT)
Dept: FAMILY MEDICINE CLINIC | Facility: CLINIC | Age: 78
End: 2023-01-13
Payer: MEDICARE

## 2023-01-13 DIAGNOSIS — R31.9 URINARY TRACT INFECTION WITH HEMATURIA, SITE UNSPECIFIED: Primary | ICD-10-CM

## 2023-01-13 DIAGNOSIS — N39.0 URINARY TRACT INFECTION WITH HEMATURIA, SITE UNSPECIFIED: Primary | ICD-10-CM

## 2023-01-13 LAB
BILIRUBIN: NEGATIVE
GLUCOSE (URINE DIPSTICK): >=1000 MG/DL
MULTISTIX LOT#: ABNORMAL NUMERIC
NITRITE, URINE: NEGATIVE
PH, URINE: 5.5 (ref 4.5–8)
PROTEIN (URINE DIPSTICK): NEGATIVE MG/DL
SPECIFIC GRAVITY: 1.01 (ref 1–1.03)
UROBILINOGEN,SEMI-QN: 0.2 MG/DL (ref 0–1.9)

## 2023-01-13 PROCEDURE — 87086 URINE CULTURE/COLONY COUNT: CPT | Performed by: FAMILY MEDICINE

## 2023-01-13 RX ORDER — LEVOTHYROXINE SODIUM 88 UG/1
TABLET ORAL
Qty: 90 TABLET | Refills: 0 | Status: SHIPPED | OUTPATIENT
Start: 2023-01-13

## 2023-01-13 NOTE — PROGRESS NOTES
Clean catch urine specimen collected. UA done  Culture sent per verbal Order Dr. Tracey Cabrera. Per verbal no treatment until culture results in.

## 2023-01-13 NOTE — TELEPHONE ENCOUNTER
Called patient's granddaughter to notify further recs after culture results in  No answer - unable to leave vm

## 2023-01-16 ENCOUNTER — TELEPHONE (OUTPATIENT)
Dept: FAMILY MEDICINE CLINIC | Facility: CLINIC | Age: 78
End: 2023-01-16

## 2023-01-16 NOTE — TELEPHONE ENCOUNTER
----- Message from Clay Ram MD sent at 1/16/2023 12:18 PM CST -----  Urine culture looks okay.   UTI resolved

## 2023-01-16 NOTE — TELEPHONE ENCOUNTER
Daughter notified. Pt is at [facility] because the fall he had broke his hip. His diagnoses are:  GI bleed  Anemia  UTI  Femur fracture    He will likely see the surgeon today and have surgery tomorrow. JORDAN

## 2023-01-25 RX ORDER — ATORVASTATIN CALCIUM 20 MG/1
TABLET, FILM COATED ORAL
Qty: 90 TABLET | Refills: 0 | OUTPATIENT
Start: 2023-01-25

## 2023-01-25 RX ORDER — DILTIAZEM HYDROCHLORIDE 180 MG/1
180 CAPSULE, COATED, EXTENDED RELEASE ORAL DAILY
Qty: 90 CAPSULE | Refills: 0 | OUTPATIENT
Start: 2023-01-25 | End: 2023-04-25

## 2023-02-03 ENCOUNTER — NURSING HOME VISIT (OUTPATIENT)
Dept: POST ACUTE CARE | Age: 78
End: 2023-02-03

## 2023-02-03 VITALS
TEMPERATURE: 97 F | OXYGEN SATURATION: 98 % | HEART RATE: 88 BPM | SYSTOLIC BLOOD PRESSURE: 110 MMHG | RESPIRATION RATE: 18 BRPM | DIASTOLIC BLOOD PRESSURE: 60 MMHG

## 2023-02-04 ENCOUNTER — PATIENT MESSAGE (OUTPATIENT)
Dept: FAMILY MEDICINE CLINIC | Facility: CLINIC | Age: 78
End: 2023-02-04

## 2023-02-06 NOTE — TELEPHONE ENCOUNTER
Delivery Read From Message Type Attachments Subject   2/6/2023 10:00 AM Sandra Martinez RN Patient Medical Advice Request  Certification for phone for the hearing impaired

## 2023-02-06 NOTE — TELEPHONE ENCOUNTER
From: Christianne Phan  To: Antonia Ruggiero MD  Sent: 2/4/2023 2:23 PM CST  Subject: Certification for phone for the hearing impaired    Hi Dr. Lito Sauer, would you mind filling out this form so that my grandfather can read what people are saying when they call? He has a very hard time understanding with his impaired hearing or when people talk too fast because of his stroke. Thank you!     https://[email or web address] com/wp-content/uploads/2020/02/Prof_Cert_Form_202001. pdf

## 2023-02-27 ENCOUNTER — PATIENT OUTREACH (OUTPATIENT)
Dept: CASE MANAGEMENT | Age: 78
End: 2023-02-27

## 2023-02-27 DIAGNOSIS — Z02.9 ENCOUNTERS FOR UNSPECIFIED ADMINISTRATIVE PURPOSE: ICD-10-CM

## 2023-02-28 ENCOUNTER — TELEPHONE (OUTPATIENT)
Dept: FAMILY MEDICINE CLINIC | Facility: CLINIC | Age: 78
End: 2023-02-28

## 2023-02-28 NOTE — PROGRESS NOTES
Attempted to contact Tori as requested by the pt for however no answer. Call continued to ring and then disconnected. Unable to leave a VM. Will await a returned phone call. TE will be sent to PCP office to FU on HFU appt as pt is a high risk for readmission.

## 2023-02-28 NOTE — TELEPHONE ENCOUNTER
Attempted to contact Tori per pt's request for TCM however no answer. Pt does not have HFU appt scheduled at this time. TCM/HFU appt recommended by 3/5/23 as pt is a high risk for readmission. Please advise. Pt was Dc'd from [facility] 2/26/23 dx:Displaced intertrochanteric fracture of right. femur    BOOK BY DATE (last date for TCM): 3/12/23    Clinical staff:  Please f/u with pt and try to get them to schedule as pt would greatly benefit from TCM/HFU appt. Thank you!

## 2023-02-28 NOTE — PROGRESS NOTES
Initial Post Discharge Follow Up   Discharge Date: 2/26/2023  Contact Date: 2/28/2023    Consent Verification:  Assessment Completed With: Other: dtr Genoveva Permission received per patient?  written  HIPAA Verified? Yes    Discharge Dx:     Displaced intertrochanteric fracture of right femur    Spoke with dtr Genoveva, briefly--reports pt ([address]) lives at home with her dtr Miki Hayden (MARISA verified), she has hired caregivers, pt has sumanth lift and [employer]. Genoveva reports [facility] ordered at [facility] discharge--requests call to Miki Hayden after 11 a.m. today, as she manages medications (many changes at [facility] discharge) and has discharge paperwork. She will text Tori to make her aware call will be made to her, later this morning.

## 2023-02-28 NOTE — TELEPHONE ENCOUNTER
Daughter scheduled    Future Appointments   Date Time Provider Mariluz Cheemai   3/10/2023  9:45 AM MD CHIARA Cedillo [facility]   4/17/2023  1:30 PM MD CHIARA Cedillo [facility]

## 2023-03-06 ENCOUNTER — TELEPHONE (OUTPATIENT)
Dept: FAMILY MEDICINE CLINIC | Facility: CLINIC | Age: 78
End: 2023-03-06

## 2023-03-06 NOTE — TELEPHONE ENCOUNTER
You will sign these orders correct?     Future Appointments   Date Time Provider Mariluz Sapp   3/10/2023  9:45 AM MD JOSS FloresOSW [facility]   4/17/2023  1:30 PM MD CHIARA Flores [facility]

## 2023-03-06 NOTE — TELEPHONE ENCOUNTER
Francisco Tamez with [facility] called said pt is going to receive home services. Francisco Tamez would like to know if Dr. Darron Matthew is the one who is going to be following his care and singing.

## 2023-03-07 NOTE — TELEPHONE ENCOUNTER
[facility] and advised DB will sign orders. Verbalized understanding.      Future Appointments   Date Time Provider Mariluz Sapp   3/10/2023  9:45 AM MD CHIARA Crowe [facility]   4/17/2023  1:30 PM MD CHIARA Crowe [facility]

## 2023-03-10 ENCOUNTER — TELEMEDICINE (OUTPATIENT)
Dept: FAMILY MEDICINE CLINIC | Facility: CLINIC | Age: 78
End: 2023-03-10
Payer: MEDICARE

## 2023-03-10 DIAGNOSIS — Z74.2 NEED FOR HOME HEALTH CARE: ICD-10-CM

## 2023-03-10 DIAGNOSIS — S72.90XD CLOSED FRACTURE OF FEMUR WITH ROUTINE HEALING, UNSPECIFIED FRACTURE MORPHOLOGY, UNSPECIFIED LATERALITY, UNSPECIFIED PORTION OF FEMUR, SUBSEQUENT ENCOUNTER: Primary | ICD-10-CM

## 2023-03-10 PROCEDURE — 99213 OFFICE O/P EST LOW 20 MIN: CPT | Performed by: FAMILY MEDICINE

## 2023-03-10 NOTE — PROGRESS NOTES
Multiple attempts to reach pt and messages left with no return call. Patient completed virtual HFU appt with PCP on 3/10/23. Encounter closing.

## 2023-03-14 ENCOUNTER — TELEPHONE (OUTPATIENT)
Dept: FAMILY MEDICINE CLINIC | Facility: CLINIC | Age: 78
End: 2023-03-14

## 2023-03-14 NOTE — TELEPHONE ENCOUNTER
Forms for patient's daughter Genoveva's FMLA in Dr Antonino duke  Last patient televisit 3/10/23  Last in OV 10/19/22  Please advise

## 2023-03-14 NOTE — TELEPHONE ENCOUNTER
Pt daughter dropped of FMLA forms to be filler for her job. Per anatoly she has talk to Dr. Tres Stone about filling this forms out.  Gave to nurse

## 2023-03-17 ENCOUNTER — TELEPHONE (OUTPATIENT)
Dept: FAMILY MEDICINE CLINIC | Facility: CLINIC | Age: 78
End: 2023-03-17

## 2023-03-17 DIAGNOSIS — R39.9 UTI SYMPTOMS: Primary | ICD-10-CM

## 2023-03-17 PROCEDURE — 81003 URINALYSIS AUTO W/O SCOPE: CPT | Performed by: FAMILY MEDICINE

## 2023-03-17 NOTE — TELEPHONE ENCOUNTER
Leave of Absence Forms/paperwork - faxed to [employer] at #[phone]      Pt's dtr, Genoveva, notified of above - she v/u  She is requesting copy of paperwork  Copy made - placed in pt folder for pt

## 2023-03-17 NOTE — TELEPHONE ENCOUNTER
Advised patient/dtr of Doctor's note below. She verbalized understanding and stated has equipment already. No further questions at this time.     Future Appointments   Date Time Provider Mariluz Sapp   3/18/2023 11:00 AM EMG [facility] NURSE [facility]

## 2023-03-17 NOTE — TELEPHONE ENCOUNTER
Pt's dtr reports when pt getting of abx -  UTI symptoms return in 1-2 weeks    No fevers  UTI symptoms always All behavioral only  Started yesterday    Requesting to drop off urine tomorrow    Ok to place urine dip and culture?   Please advise, thank you

## 2023-03-17 NOTE — TELEPHONE ENCOUNTER
DAUGHTER WOULD LIKE TO DROP OFF URINE FOR POSSIBLE UTI  VERY AGITATED,  THREW SOMETHING THIS MORNING ON THE TABLE  HOLDS HIS URINE MOST OF THE DAY  SHE WANTS TO DROP OFF TOMORROW  PLEASE CALL DAUGHTER,

## 2023-03-18 ENCOUNTER — NURSE ONLY (OUTPATIENT)
Dept: FAMILY MEDICINE CLINIC | Facility: CLINIC | Age: 78
End: 2023-03-18
Payer: MEDICARE

## 2023-03-18 DIAGNOSIS — R39.9 UTI SYMPTOMS: ICD-10-CM

## 2023-03-18 LAB
APPEARANCE: CLEAR
BILIRUBIN: NEGATIVE
GLUCOSE (URINE DIPSTICK): 100 MG/DL
KETONES (URINE DIPSTICK): NEGATIVE MG/DL
LEUKOCYTES: NEGATIVE
MULTISTIX LOT#: ABNORMAL NUMERIC
NITRITE, URINE: NEGATIVE
OCCULT BLOOD: NEGATIVE
PH, URINE: 6.5 (ref 4.5–8)
PROTEIN (URINE DIPSTICK): NEGATIVE MG/DL
SPECIFIC GRAVITY: 1.01 (ref 1–1.03)
URINE-COLOR: YELLOW
UROBILINOGEN,SEMI-QN: 0.2 MG/DL (ref 0–1.9)

## 2023-03-18 PROCEDURE — 87086 URINE CULTURE/COLONY COUNT: CPT | Performed by: FAMILY MEDICINE

## 2023-03-18 NOTE — PATIENT INSTRUCTIONS
Daughter notified his urine dip shows glucose but otherwise is WNL. Advise we will be in touch once the culture is finalized.

## 2023-03-27 ENCOUNTER — TELEPHONE (OUTPATIENT)
Dept: FAMILY MEDICINE CLINIC | Facility: CLINIC | Age: 78
End: 2023-03-27

## 2023-03-27 RX ORDER — SUCRALFATE 1 G/1
1 TABLET ORAL
Qty: 1 TABLET | Refills: 0 | COMMUNITY
Start: 2023-03-27

## 2023-03-27 RX ORDER — FINASTERIDE 5 MG/1
5 TABLET, FILM COATED ORAL DAILY
Qty: 90 TABLET | Refills: 0 | COMMUNITY
Start: 2023-03-27

## 2023-03-27 RX ORDER — TAMSULOSIN HYDROCHLORIDE 0.4 MG/1
0.4 CAPSULE ORAL DAILY
Qty: 30 CAPSULE | Refills: 0 | COMMUNITY
Start: 2023-03-27

## 2023-03-27 RX ORDER — BUMETANIDE 1 MG/1
1 TABLET ORAL 2 TIMES DAILY
Qty: 1 TABLET | Refills: 0 | Status: SHIPPED | OUTPATIENT
Start: 2023-03-27

## 2023-03-27 NOTE — TELEPHONE ENCOUNTER
LOV 10/19/22 for DM. XARELTO 20 MG Oral Tab 90 tablet 0 8/17/2022     Future Appointments   Date Time Provider Mariluz Senait   4/17/2023  1:30 PM MD CHIARA Flores to send?

## 2023-03-27 NOTE — TELEPHONE ENCOUNTER
RADHA,  Talked to [place], Pt's Grand-daughter  said Dr Tay Nails would look at his medications that are being prescribed by [facility] (Short term rehab facility)     Charlee states she has called the pharmacy and is trying to get these refilled, they told her to call pt's .  I encouraged her to call the pharmacy and [facility] again, since Dr Sol Adam is out of the office this week. Sucralfate 1g 1 tab by mouth before meals and @  Bedtime    Tamsulosin . 4mg cap 1 x in the evening    Bumetanide 1mg tab 1 tab by mouth twice daily for diuretics    Finasteride 5mg tab 1 x a day on empty stomach

## 2023-03-28 ENCOUNTER — TELEPHONE (OUTPATIENT)
Dept: FAMILY MEDICINE CLINIC | Facility: CLINIC | Age: 78
End: 2023-03-28

## 2023-03-28 DIAGNOSIS — N18.30 DIABETES MELLITUS WITH STAGE 3 CHRONIC KIDNEY DISEASE (HCC): ICD-10-CM

## 2023-03-28 DIAGNOSIS — N18.30 STAGE 3 CHRONIC KIDNEY DISEASE, UNSPECIFIED WHETHER STAGE 3A OR 3B CKD (HCC): ICD-10-CM

## 2023-03-28 DIAGNOSIS — G82.20 PARAPLEGIA (HCC): ICD-10-CM

## 2023-03-28 DIAGNOSIS — S72.90XD CLOSED FRACTURE OF FEMUR WITH ROUTINE HEALING, UNSPECIFIED FRACTURE MORPHOLOGY, UNSPECIFIED LATERALITY, UNSPECIFIED PORTION OF FEMUR, SUBSEQUENT ENCOUNTER: Primary | ICD-10-CM

## 2023-03-28 DIAGNOSIS — E66.01 SEVERE OBESITY (BMI 35.0-39.9) WITH COMORBIDITY (HCC): ICD-10-CM

## 2023-03-28 DIAGNOSIS — E11.22 DIABETES MELLITUS WITH STAGE 3 CHRONIC KIDNEY DISEASE (HCC): ICD-10-CM

## 2023-03-28 RX ORDER — RIVAROXABAN 20 MG/1
TABLET, FILM COATED ORAL
Qty: 90 TABLET | Refills: 0 | Status: SHIPPED | OUTPATIENT
Start: 2023-03-28

## 2023-03-28 NOTE — TELEPHONE ENCOUNTER
Pt needs a RX/order to get a R Ankle Brace sent to [facility]  [address] [place], [place]     Fax # [phone]  Ph.    [phone]

## 2023-03-28 NOTE — TELEPHONE ENCOUNTER
[facility]  requesting new order for [facility] in [place]  Patient needs new right ankle brace-the one he has now is too large since he lost weight  Has lost about 25 lbs  Order placed and faxed    Also daughter wants to change 4/17 visit to video visit to discuss meds that [facility] was prescribing patient so Dr Roxy Melton can take over prescribing them. Med list was updated yesterday  States it is too hard to get her dad into the office  Aware DB out of the office until next week  Ok to switch to VV?   Future Appointments   Date Time Provider Mariluz Sapp   4/17/2023  1:30 PM Therese Howard MD [facility]

## 2023-03-29 ENCOUNTER — TELEPHONE (OUTPATIENT)
Dept: FAMILY MEDICINE CLINIC | Facility: CLINIC | Age: 78
End: 2023-03-29

## 2023-03-29 NOTE — TELEPHONE ENCOUNTER
Needs appt for this? He has an upcoming appt - ok to address then?     Future Appointments   Date Time Provider Mariluz Sapp   4/17/2023  1:30 PM Derwood Kocher, MD [facility]

## 2023-03-29 NOTE — TELEPHONE ENCOUNTER
Daughter Genoveva called, states Pt is having difficulty staying asleep. He can fall asleep ok, but he cant stay asleep for the past few wks. Would like a RX for sleep. Informed Daughter he may need an appt with Dr Cydney Zheng.

## 2023-04-06 ENCOUNTER — HOME HEALTH CHARGES (OUTPATIENT)
Dept: FAMILY MEDICINE CLINIC | Facility: CLINIC | Age: 78
End: 2023-04-06

## 2023-04-06 DIAGNOSIS — S72.141D DISPLACED INTERTROCHANTERIC FRACTURE OF RIGHT FEMUR, SUBSEQUENT ENCOUNTER FOR CLOSED FRACTURE WITH ROUTINE HEALING: ICD-10-CM

## 2023-04-07 NOTE — TELEPHONE ENCOUNTER
[id number]   Patient's daughter wanted Dr. Hanna Davey to know that patient was requesting medication to help sleep. patient's daughter not comfortable with this and does not want this prescribed at patient's visit.             Future Appointments   Date Time Provider Mariluz Sapp   4/17/2023  1:30 PM Candace White MD [facility]     Patient has no physical concerns will change visit to video

## 2023-04-12 ENCOUNTER — TELEPHONE (OUTPATIENT)
Dept: FAMILY MEDICINE CLINIC | Facility: CLINIC | Age: 78
End: 2023-04-12

## 2023-04-12 NOTE — TELEPHONE ENCOUNTER
PATIENT IS RETAINING FLUID AND NURSE IS ASKING IF HE CAN GET A WATER PILL.   PATIENT WAS ON AT [facility] AND IS NOW OUT  [place] [address] [address][id number] - [address], [address] AT [address] [place] [address], [phone], [phone]   [address] [address]   Phone: [phone] Fax: [phone]

## 2023-04-12 NOTE — TELEPHONE ENCOUNTER
LMTCB  Chest pain/SOB?     Has appt 4/17-will most likely address at this time    Future Appointments   Date Time Provider Mariluz Sapp   4/17/2023  1:30 PM Porfirio Cedeno MD [facility]

## 2023-04-13 RX ORDER — BUMETANIDE 1 MG/1
1 TABLET ORAL 2 TIMES DAILY
Qty: 14 TABLET | Refills: 0 | Status: SHIPPED | OUTPATIENT
Start: 2023-04-13

## 2023-04-13 NOTE — TELEPHONE ENCOUNTER
Patient having some fluid retention - lower extremity  Pitting edema on right ankle  Per Kevin Kam he can hear a little fluid in both lower lobe of lungs -patient up in wheel chair  Denies SOB/difficulty breathing  Denies Chest pain  Per Kevin Kam - he was on lasix in the rehab center - this was disontinued after he was discharged  Per fang family and patient requesting refill on lasix - he was unsure of dose

## 2023-04-13 NOTE — TELEPHONE ENCOUNTER
RX REFILL REQUESTED     bumetanide 1 MG Oral Tab     [id number] - [facility] [address] [address], [phone], [phone]    Per anatoly can pt get bridging rx till pt is seen. Future Appointments   Date Time Provider Mariluz Sapp   4/17/2023  1:30 PM Duc Ellison MD [facility]   4/24/2023  1:00 PM Duc Ellison MD [facility]     The 24th is the earliest that she can get him here due to transportation.

## 2023-04-13 NOTE — TELEPHONE ENCOUNTER
Per patient's daughter, patient unable to be transported here until 4/24  The family has to use a service in order to get him here for eval and they are booked. They will call every morning to see if they have cancellations prior to the apt date  Daughter aware of risk of pneumonia  Patient's daughter will call 911 if patient develops any SOB, difficulty breathing, fever, lethargy  Daughter asking for small supply of bumetanide to help in mean time.   Please advise

## 2023-04-13 NOTE — TELEPHONE ENCOUNTER
Patient was getting these two medication from the rehab facility he was staying at  Patient almost out of medication  Patient's daughter requesting a refill  Per Anatoly patient's appetite very poor without the mirtazapine    Pantoprazole 40 mg 1 tab every 12 hours  Mirtazapine 7.5 mg for appetite/sleep once daily  Meds pended.     Called patient's daughter anatoly and notified it is recommended patient be seen for eval due to fluid retention and lung sounds  She will bring patient to IC tomorrow

## 2023-04-14 ENCOUNTER — HOSPITAL ENCOUNTER (OUTPATIENT)
Age: 78
Discharge: HOME | End: 2023-04-14
Payer: MEDICARE

## 2023-04-14 ENCOUNTER — APPOINTMENT (OUTPATIENT)
Dept: GENERAL RADIOLOGY | Age: 78
End: 2023-04-14
Attending: NURSE PRACTITIONER
Payer: MEDICARE

## 2023-04-14 VITALS
TEMPERATURE: 98 F | SYSTOLIC BLOOD PRESSURE: 110 MMHG | OXYGEN SATURATION: 97 % | RESPIRATION RATE: 16 BRPM | HEART RATE: 83 BPM | DIASTOLIC BLOOD PRESSURE: 44 MMHG

## 2023-04-14 DIAGNOSIS — R05.9 COUGH: Primary | ICD-10-CM

## 2023-04-14 PROCEDURE — 71046 X-RAY EXAM CHEST 2 VIEWS: CPT | Performed by: NURSE PRACTITIONER

## 2023-04-14 PROCEDURE — 99203 OFFICE O/P NEW LOW 30 MIN: CPT | Performed by: NURSE PRACTITIONER

## 2023-04-14 RX ORDER — MIRTAZAPINE 7.5 MG/1
7.5 TABLET, FILM COATED ORAL NIGHTLY
Qty: 90 TABLET | Refills: 0 | Status: SHIPPED | OUTPATIENT
Start: 2023-04-14

## 2023-04-14 RX ORDER — PANTOPRAZOLE SODIUM 40 MG/1
40 TABLET, DELAYED RELEASE ORAL
Qty: 180 TABLET | Refills: 0 | Status: SHIPPED | OUTPATIENT
Start: 2023-04-14

## 2023-04-14 RX ORDER — BUMETANIDE 1 MG/1
1 TABLET ORAL 2 TIMES DAILY
Qty: 60 TABLET | Refills: 0 | OUTPATIENT
Start: 2023-04-14

## 2023-04-14 NOTE — TELEPHONE ENCOUNTER
Patient seen in [address] [place] [address] today  Patient had x-ray done  Per Ish Mcdowell in [address] [place] [address] - patient's daughter requesting more tabs on bumex  Okay to send 80 day or 30 day?     Med pended

## 2023-04-14 NOTE — ED INITIAL ASSESSMENT (HPI)
Home health care RN thought he heard something in his lower lungs, +cough. Pt would like refills for a Bumex.

## 2023-04-14 NOTE — DISCHARGE INSTRUCTIONS
Follow-up with your primary care physician for all of your healthcare needs  Continue your Bumex daily  Tylenol as needed for fever, body aches and chills  Return to the emergency room from symptoms or concerns.

## 2023-04-17 ENCOUNTER — TELEMEDICINE (OUTPATIENT)
Dept: FAMILY MEDICINE CLINIC | Facility: CLINIC | Age: 78
End: 2023-04-17
Payer: MEDICARE

## 2023-04-17 DIAGNOSIS — E78.5 HYPERLIPIDEMIA ASSOCIATED WITH TYPE 2 DIABETES MELLITUS (HCC): ICD-10-CM

## 2023-04-17 DIAGNOSIS — E11.69 HYPERLIPIDEMIA ASSOCIATED WITH TYPE 2 DIABETES MELLITUS (HCC): ICD-10-CM

## 2023-04-17 DIAGNOSIS — E11.22 DIABETES MELLITUS WITH STAGE 3 CHRONIC KIDNEY DISEASE (HCC): ICD-10-CM

## 2023-04-17 DIAGNOSIS — N18.30 DIABETES MELLITUS WITH STAGE 3 CHRONIC KIDNEY DISEASE (HCC): ICD-10-CM

## 2023-04-17 DIAGNOSIS — I48.91 ATRIAL FIBRILLATION WITH RAPID VENTRICULAR RESPONSE (HCC): ICD-10-CM

## 2023-04-17 DIAGNOSIS — G82.20 PARAPLEGIA (HCC): ICD-10-CM

## 2023-04-17 DIAGNOSIS — I63.9 CEREBROVASCULAR ACCIDENT (CVA), UNSPECIFIED MECHANISM (HCC): Primary | ICD-10-CM

## 2023-04-17 DIAGNOSIS — I10 ESSENTIAL HYPERTENSION WITH GOAL BLOOD PRESSURE LESS THAN 140/90: ICD-10-CM

## 2023-04-17 RX ORDER — COLCHICINE 0.6 MG/1
TABLET ORAL
Refills: 0 | Status: CANCELLED
Start: 2023-04-17

## 2023-04-17 RX ORDER — PEN NEEDLE, DIABETIC 29 G X1/2"
NEEDLE, DISPOSABLE MISCELLANEOUS
Qty: 400 EACH | Refills: 0 | Status: SHIPPED | OUTPATIENT
Start: 2023-04-17

## 2023-04-17 RX ORDER — METOPROLOL SUCCINATE 25 MG/1
25 TABLET, EXTENDED RELEASE ORAL DAILY
Qty: 90 TABLET | Refills: 0 | Status: SHIPPED | OUTPATIENT
Start: 2023-04-17

## 2023-04-17 RX ORDER — BUMETANIDE 1 MG/1
1 TABLET ORAL 2 TIMES DAILY
Qty: 14 TABLET | Refills: 0 | Status: CANCELLED
Start: 2023-04-17

## 2023-04-17 RX ORDER — COLCHICINE 0.6 MG/1
TABLET ORAL
COMMUNITY
Start: 2023-03-02

## 2023-04-18 ENCOUNTER — TELEPHONE (OUTPATIENT)
Dept: FAMILY MEDICINE CLINIC | Facility: CLINIC | Age: 78
End: 2023-04-18

## 2023-04-18 NOTE — TELEPHONE ENCOUNTER
Daughter [facility], wants to know if there is a Medical Dr  that can come to the house. He needs an appt for his feet,  clinic needs a RX or order for Diabetic shoes, but they medical Dr To look at his feet. Pt want to know if there was a Medical Dr that could come to his house, states Pt is a fall risk and it is very difficult to get him in and out of the house.   Or could this be done via Video Visit

## 2023-04-18 NOTE — TELEPHONE ENCOUNTER
Advised pt's dtr, Genoveva, that Dr. Paula Llanes not in office today, will be back tomorrow - may receive response tomorrow - she v/u  No further questions at this time    Please see note below  Please advise, thank you

## 2023-04-19 NOTE — TELEPHONE ENCOUNTER
I do not know of any doctors doing home visits at this time  As advised before it would be good if he can come into the office at least once a year for Medicare exam and everything can be taken care of at that time.   He can ask home health nurse if they work with any doctors that do home visits

## 2023-04-19 NOTE — TELEPHONE ENCOUNTER
Daughter advised. Verbalized understanding. Will contact [place] RN  Also has appt in office scheduled with Dr Gauri Castano for his feet-verified again to daughter that this has to be in person, not video. She verbalized understanding.      Future Appointments   Date Time Provider Mariluz Sapp   4/26/2023 10:00 AM Tatyana Martinez MD [facility]

## 2023-04-20 ENCOUNTER — TELEPHONE (OUTPATIENT)
Dept: FAMILY MEDICINE CLINIC | Facility: CLINIC | Age: 78
End: 2023-04-20

## 2023-04-21 ENCOUNTER — TELEPHONE (OUTPATIENT)
Dept: FAMILY MEDICINE CLINIC | Facility: CLINIC | Age: 78
End: 2023-04-21

## 2023-04-21 DIAGNOSIS — N18.30 DIABETES MELLITUS WITH STAGE 3 CHRONIC KIDNEY DISEASE (HCC): Primary | ICD-10-CM

## 2023-04-21 DIAGNOSIS — E11.22 DIABETES MELLITUS WITH STAGE 3 CHRONIC KIDNEY DISEASE (HCC): Primary | ICD-10-CM

## 2023-04-21 RX ORDER — BLOOD SUGAR DIAGNOSTIC
STRIP MISCELLANEOUS
Qty: 300 STRIP | Refills: 0 | Status: SHIPPED | OUTPATIENT
Start: 2023-04-21 | End: 2024-04-20

## 2023-04-24 ENCOUNTER — TELEPHONE (OUTPATIENT)
Dept: FAMILY MEDICINE CLINIC | Facility: CLINIC | Age: 78
End: 2023-04-24

## 2023-04-24 RX ORDER — BUMETANIDE 1 MG/1
1 TABLET ORAL 2 TIMES DAILY
Qty: 180 TABLET | Refills: 0 | Status: CANCELLED | OUTPATIENT
Start: 2023-04-24

## 2023-04-24 NOTE — TELEPHONE ENCOUNTER
PT daughter called requesting a kathy refill for      bumetanide 1 MG Oral Tab     [id number] - [place], [place] - [address] [facility] OF [address] [place] [address], [phone], [phone]      PT  has an apt scheduled daughter would like to know if Dr. Dax May can see him for both of this issues he needs orders for otopathic shoes and a motorized wheelchair. Per anatoly if Dr. Jaye Banks see him for both then the wheelchair is priority.

## 2023-04-25 NOTE — TELEPHONE ENCOUNTER
[address] daughter is suppose to call cardiology for Bumex refills. Everything can be addressed at appt. Should have 30 min appt. Bring all paperwork that will be needed to be filled out to appt or drop beforehand.  May need to postpone appt if does not have wheelchair paperwork

## 2023-04-25 NOTE — TELEPHONE ENCOUNTER
Left message to voicemail (per verbal release form consent, confirmed with identifying message.)  Advised patient's dtr to call office back [phone] - need to discuss note below.

## 2023-04-25 NOTE — TELEPHONE ENCOUNTER
Discussed with Dr. Candida Garza regarding note below - please confirm with pt's dtr that they have all paperwork needed for appt tomorrow. If not, can reschedule in 2 weeks.       Called and spoke with pt's dtr, Genoveva - advised of note above - she stated [employer] should have faxed paperwork - advised will check and call her back if unable to find - she v/u    Received fax from [facility] regarding [address] in Dr. Felipe duke    Routing to PCP as Salas Cheatham    Future Appointments   Date Time Provider Mariluz Cheemai   4/26/2023 10:00 AM MD CHIARA Ellis

## 2023-04-25 NOTE — TELEPHONE ENCOUNTER
Advised patient's dtr Genoveva of Doctor's note below- Re: refill. She verbalized understanding, thanked caller and hung up. Was unable to notify re: appt/paperwork.     Future Appointments   Date Time Provider Mariluz Senait   4/26/2023 10:00 AM Yesenia Blair MD [facility]     Routing to PCP as Douglass Gowers

## 2023-04-26 ENCOUNTER — OFFICE VISIT (OUTPATIENT)
Dept: FAMILY MEDICINE CLINIC | Facility: CLINIC | Age: 78
End: 2023-04-26
Payer: MEDICARE

## 2023-04-26 VITALS
SYSTOLIC BLOOD PRESSURE: 100 MMHG | RESPIRATION RATE: 20 BRPM | DIASTOLIC BLOOD PRESSURE: 60 MMHG | TEMPERATURE: 97 F | HEART RATE: 66 BPM

## 2023-04-26 DIAGNOSIS — F32.A DEPRESSION, UNSPECIFIED DEPRESSION TYPE: Primary | ICD-10-CM

## 2023-04-26 PROCEDURE — 99213 OFFICE O/P EST LOW 20 MIN: CPT | Performed by: FAMILY MEDICINE

## 2023-04-26 PROCEDURE — 1159F MED LIST DOCD IN RCRD: CPT | Performed by: FAMILY MEDICINE

## 2023-04-26 PROCEDURE — 3078F DIAST BP <80 MM HG: CPT | Performed by: FAMILY MEDICINE

## 2023-04-26 PROCEDURE — 3074F SYST BP LT 130 MM HG: CPT | Performed by: FAMILY MEDICINE

## 2023-04-26 PROCEDURE — 1160F RVW MEDS BY RX/DR IN RCRD: CPT | Performed by: FAMILY MEDICINE

## 2023-04-26 RX ORDER — SERTRALINE HYDROCHLORIDE 25 MG/1
25 TABLET, FILM COATED ORAL DAILY
Qty: 30 TABLET | Refills: 0 | Status: CANCELLED | OUTPATIENT
Start: 2023-04-26 | End: 2023-05-26

## 2023-05-02 ENCOUNTER — TELEPHONE (OUTPATIENT)
Dept: FAMILY MEDICINE CLINIC | Facility: CLINIC | Age: 78
End: 2023-05-02

## 2023-05-02 DIAGNOSIS — N18.30 DIABETES MELLITUS WITH STAGE 3 CHRONIC KIDNEY DISEASE (HCC): ICD-10-CM

## 2023-05-02 DIAGNOSIS — E11.22 DIABETES MELLITUS WITH STAGE 3 CHRONIC KIDNEY DISEASE (HCC): ICD-10-CM

## 2023-05-02 DIAGNOSIS — G82.20 PARAPLEGIA (HCC): Primary | ICD-10-CM

## 2023-05-02 NOTE — TELEPHONE ENCOUNTER
LOV 04/26/23 with Dr. Chapis Ascencio    Please see note below    Order(s) pending, please review. Thank you.

## 2023-05-02 NOTE — TELEPHONE ENCOUNTER
Garry  @ [facility] Medical needs face to face Chart notes from Pt's last visit 4/26/23, for the motorized wheel chair.      Ph. [phone]  Fax [phone]

## 2023-05-02 NOTE — TELEPHONE ENCOUNTER
Pt was seen last week, dr Block Marking was going to fax to [facility] a RX for Diabetic Shoes and Resting Hand Brace  Daughter Genoveva called [facility] and they have not received this order,  Can this be sent again.     [facility] Fax # [phone]

## 2023-05-05 ENCOUNTER — TELEPHONE (OUTPATIENT)
Dept: FAMILY MEDICINE CLINIC | Facility: CLINIC | Age: 78
End: 2023-05-05

## 2023-05-10 ENCOUNTER — TELEPHONE (OUTPATIENT)
Dept: FAMILY MEDICINE CLINIC | Facility: CLINIC | Age: 78
End: 2023-05-10

## 2023-05-10 NOTE — TELEPHONE ENCOUNTER
RADHA Contreras[facility] [employer], will be faxing forms for an upcoming appt with dr Jay Jaquez on 5/19/23    Future Appointments   Date Time Provider Mariluz Sapp   5/19/2023 10:45 AM Nathan Rosenthal MD [facility]

## 2023-05-30 RX ORDER — METOPROLOL SUCCINATE 25 MG/1
TABLET, EXTENDED RELEASE ORAL
Qty: 90 TABLET | Refills: 0 | Status: SHIPPED | OUTPATIENT
Start: 2023-05-30

## 2023-05-30 NOTE — TELEPHONE ENCOUNTER
LOV 4/26/23 for a f/u. Cholesterol Medication Protocol Failed 05/27/2023 12:29 PM   Protocol Details  Lipid panel within past 12 months    ALT < 80    ALT resulted within past year    Appointment within past 12 or next 3 months     Please advise. No future appointments.

## 2023-06-01 ENCOUNTER — TELEPHONE (OUTPATIENT)
Dept: FAMILY MEDICINE CLINIC | Facility: CLINIC | Age: 78
End: 2023-06-01

## 2023-06-01 RX ORDER — ATORVASTATIN CALCIUM 20 MG/1
TABLET, FILM COATED ORAL
Qty: 90 TABLET | Refills: 1 | Status: SHIPPED | OUTPATIENT
Start: 2023-06-01

## 2023-06-01 NOTE — TELEPHONE ENCOUNTER
Patients Granddaughter States he is taking Colchine daily. She states he does not need this medication at this time so she will have him stop Colchine and if we can refill his stain.  She will make sure he stops taking the medication colchine and start taking the statin

## 2023-06-01 NOTE — TELEPHONE ENCOUNTER
Patient daughter anatoly advised and verbalized understanding  Yaneth Mukherjee will stop colchine  Rx refilled   Med list updated

## 2023-06-01 NOTE — TELEPHONE ENCOUNTER
Should not be on statin and colchine together  Stop gout med if not an issue  Verify this is ok before refilling statin

## 2023-06-01 NOTE — TELEPHONE ENCOUNTER
----- Message from Karol Doran MD sent at 6/1/2023  1:40 AM CDT -----  Please call [place] family and get a reported height.   Put in chart under telemedicine visit for annual Medicare exam.  Thx  Dr. Hilda Aguilar

## 2023-06-08 ENCOUNTER — TELEPHONE (OUTPATIENT)
Dept: FAMILY MEDICINE CLINIC | Facility: CLINIC | Age: 78
End: 2023-06-08

## 2023-06-08 NOTE — TELEPHONE ENCOUNTER
Pt daughter anatoly came to window said Dr. Marvin Jacobo to wrong visit notes. Per anatoly Dr. Marvin Jacobo the notes form visit 04/17 but he was seen 04/26 for the diabetic shoes. Also anatoly dropped off form to be filled for the shoes per pt question 2 has to be answered and one of those reasons has to be added to visit note for the diabetic shoes. Per anatoly both form and visit notes have to match including one of the reasoning. Fax # [phone] Clement Number with [facility]. Anatoly would like a call when they have been fax.    Gave form to nurse

## 2023-06-09 RX ORDER — LEVOTHYROXINE SODIUM 88 UG/1
TABLET ORAL
Qty: 90 TABLET | Refills: 0 | Status: SHIPPED | OUTPATIENT
Start: 2023-06-09

## 2023-06-09 NOTE — TELEPHONE ENCOUNTER
Last visit 04/26/2023  Last refill 01/13/2023   Thyroid Supplements Protocol Failed 06/09/2023 02:01 AM   Protocol Details  TSH test in past 12 months    TSH value between 0.350 and 5.500 IU/ml    Appointment in past 12 or next 3 months

## 2023-06-16 RX ORDER — RIVAROXABAN 20 MG/1
TABLET, FILM COATED ORAL
Qty: 90 TABLET | Refills: 0 | Status: SHIPPED | OUTPATIENT
Start: 2023-06-16

## 2023-06-16 NOTE — TELEPHONE ENCOUNTER
LOV: 4/26/23 for depression     XARELTO 20 MG Oral Tab 90 tablet 0 3/28/2023    Sig: Jonny Pereira 1 TABLET EVERY DAY WITH FOOD       Future Appointments   Date Time Provider Mariluz Sapp   7/6/2023  2:20 PM Emeterio Valladares MD [facility]

## 2023-06-20 NOTE — TELEPHONE ENCOUNTER
Last OV 4/26/23  Last refilled on 4/14/23 for # 90 with 0 refills  Future Appointments   Date Time Provider Mariluz Sapp   7/6/2023  2:20 PM Darcy Camacho MD [facility]        Thank you.

## 2023-06-23 ENCOUNTER — TELEPHONE (OUTPATIENT)
Dept: FAMILY MEDICINE CLINIC | Facility: CLINIC | Age: 78
End: 2023-06-23

## 2023-06-23 RX ORDER — PANTOPRAZOLE SODIUM 40 MG/1
40 TABLET, DELAYED RELEASE ORAL
Qty: 180 TABLET | Refills: 0 | Status: SHIPPED | OUTPATIENT
Start: 2023-06-23

## 2023-06-23 RX ORDER — MIRTAZAPINE 7.5 MG/1
7.5 TABLET, FILM COATED ORAL NIGHTLY
Qty: 90 TABLET | Refills: 0 | Status: SHIPPED | OUTPATIENT
Start: 2023-06-23

## 2023-06-29 RX ORDER — TAMSULOSIN HYDROCHLORIDE 0.4 MG/1
0.4 CAPSULE ORAL DAILY
Qty: 30 CAPSULE | Refills: 0 | Status: SHIPPED | OUTPATIENT
Start: 2023-06-29

## 2023-06-29 RX ORDER — BUMETANIDE 1 MG/1
1 TABLET ORAL 2 TIMES DAILY
Qty: 14 TABLET | Refills: 0 | Status: SHIPPED | OUTPATIENT
Start: 2023-06-29

## 2023-06-29 RX ORDER — FINASTERIDE 5 MG/1
5 TABLET, FILM COATED ORAL DAILY
Qty: 90 TABLET | Refills: 0 | Status: SHIPPED | OUTPATIENT
Start: 2023-06-29

## 2023-06-30 RX ORDER — TAMSULOSIN HYDROCHLORIDE 0.4 MG/1
0.4 CAPSULE ORAL DAILY
Qty: 90 CAPSULE | Refills: 0 | OUTPATIENT
Start: 2023-06-30

## 2023-07-03 ENCOUNTER — TELEPHONE (OUTPATIENT)
Dept: FAMILY MEDICINE CLINIC | Facility: CLINIC | Age: 78
End: 2023-07-03

## 2023-07-03 RX ORDER — TAMSULOSIN HYDROCHLORIDE 0.4 MG/1
CAPSULE ORAL
Qty: 30 CAPSULE | Refills: 0 | OUTPATIENT
Start: 2023-07-03

## 2023-07-06 RX ORDER — FINASTERIDE 5 MG/1
TABLET, FILM COATED ORAL
Qty: 90 TABLET | Refills: 0 | OUTPATIENT
Start: 2023-07-06

## 2023-07-11 RX ORDER — PEN NEEDLE, DIABETIC 29 G X1/2"
NEEDLE, DISPOSABLE MISCELLANEOUS
Qty: 400 EACH | Refills: 0 | Status: SHIPPED | OUTPATIENT
Start: 2023-07-11

## 2023-07-11 RX ORDER — MIRTAZAPINE 7.5 MG/1
TABLET, FILM COATED ORAL
Qty: 90 TABLET | Refills: 0 | OUTPATIENT
Start: 2023-07-11

## 2023-07-11 RX ORDER — BLOOD SUGAR DIAGNOSTIC
STRIP MISCELLANEOUS
Qty: 300 STRIP | Refills: 2 | Status: SHIPPED | OUTPATIENT
Start: 2023-07-11 | End: 2024-07-10

## 2023-07-11 NOTE — TELEPHONE ENCOUNTER
LOV 04/26/23  Last labs 02/02/23  Last refill on 04/17/23, for #400 each, with 0 refills  Insulin Pen Needle (BD PEN NEEDLE ORIGINAL U/F) 29G X 12.7MM Does not apply Misc   Diabetic Supplies Protocol Eldotq6307/11/2023 09:26 AM    Appointment in the past 12 or next 3 months     Future Appointments   Date Time Provider Mariluz Sapp   7/20/2023  4:00 PM Neeta Morgan MD [facility]     Order per protocol

## 2023-07-11 NOTE — TELEPHONE ENCOUNTER
LOV 04/26/23  Last refill on 06/23/23, for #90 tabs, with 0 refills  mirtazapine 7.5 MG Oral Tab     Sent to   [facility], [address] [place] [address] [phone], [phone]       Future Appointments   Date Time Provider Mariluz Sapp   7/20/2023  4:00 PM Magy Gregory MD [facility] Greg Thompson     Denied - duplicate

## 2023-07-20 ENCOUNTER — OFFICE VISIT (OUTPATIENT)
Dept: FAMILY MEDICINE CLINIC | Facility: CLINIC | Age: 78
End: 2023-07-20
Payer: MEDICARE

## 2023-07-20 VITALS
WEIGHT: 275 LBS | DIASTOLIC BLOOD PRESSURE: 78 MMHG | HEART RATE: 85 BPM | RESPIRATION RATE: 18 BRPM | BODY MASS INDEX: 36.45 KG/M2 | OXYGEN SATURATION: 98 % | HEIGHT: 73 IN | TEMPERATURE: 97 F | SYSTOLIC BLOOD PRESSURE: 146 MMHG

## 2023-07-20 DIAGNOSIS — N64.4 NIPPLE PAIN: ICD-10-CM

## 2023-07-20 DIAGNOSIS — M25.561 CHRONIC PAIN OF RIGHT KNEE: Primary | ICD-10-CM

## 2023-07-20 DIAGNOSIS — G89.29 CHRONIC PAIN OF RIGHT KNEE: Primary | ICD-10-CM

## 2023-07-20 PROBLEM — T50.902A INTENTIONAL OVERDOSE, INITIAL ENCOUNTER (HCC): Status: ACTIVE | Noted: 2023-07-20

## 2023-07-20 PROCEDURE — 3078F DIAST BP <80 MM HG: CPT | Performed by: FAMILY MEDICINE

## 2023-07-20 PROCEDURE — 1159F MED LIST DOCD IN RCRD: CPT | Performed by: FAMILY MEDICINE

## 2023-07-20 PROCEDURE — 3008F BODY MASS INDEX DOCD: CPT | Performed by: FAMILY MEDICINE

## 2023-07-20 PROCEDURE — 3077F SYST BP >= 140 MM HG: CPT | Performed by: FAMILY MEDICINE

## 2023-07-20 PROCEDURE — 99214 OFFICE O/P EST MOD 30 MIN: CPT | Performed by: FAMILY MEDICINE

## 2023-07-20 PROCEDURE — 1160F RVW MEDS BY RX/DR IN RCRD: CPT | Performed by: FAMILY MEDICINE

## 2023-07-20 RX ORDER — ACETAMINOPHEN AND CODEINE PHOSPHATE 300; 30 MG/1; MG/1
1 TABLET ORAL DAILY PRN
Qty: 15 TABLET | Refills: 0 | Status: SHIPPED | OUTPATIENT
Start: 2023-07-20

## 2023-07-20 RX ORDER — OXYCODONE HYDROCHLORIDE 5 MG/1
5 TABLET ORAL EVERY 4 HOURS PRN
COMMUNITY
Start: 2023-02-26

## 2023-07-21 ENCOUNTER — TELEPHONE (OUTPATIENT)
Dept: FAMILY MEDICINE CLINIC | Facility: CLINIC | Age: 78
End: 2023-07-21

## 2023-07-21 DIAGNOSIS — N64.4 NIPPLE PAIN: Primary | ICD-10-CM

## 2023-07-21 RX ORDER — TAMSULOSIN HYDROCHLORIDE 0.4 MG/1
0.4 CAPSULE ORAL DAILY
Qty: 30 CAPSULE | Refills: 0 | Status: SHIPPED | OUTPATIENT
Start: 2023-07-21 | End: 2023-07-24

## 2023-07-21 NOTE — TELEPHONE ENCOUNTER
LOV: 7/20/23 for pain    tamsulosin 0.4 MG Oral Cap  Take 1 capsule (0.4 mg total) by mouth daily. Dispense: 30 capsule, Refills: 0 ordered       06/29/2023     No future appointments.

## 2023-07-21 NOTE — TELEPHONE ENCOUNTER
Mammography called said that for men 25 and older orders have to be a diagnostic bilateral mammogram can the order be updated.

## 2023-07-21 NOTE — TELEPHONE ENCOUNTER
Pt's granddaughter called requesting refill for     tamsulosin 0.4 MG Oral Cap to be sent to     [id number] - [address]

## 2023-07-24 RX ORDER — TAMSULOSIN HYDROCHLORIDE 0.4 MG/1
0.4 CAPSULE ORAL DAILY
Qty: 90 CAPSULE | Refills: 0 | Status: SHIPPED | OUTPATIENT
Start: 2023-07-24

## 2023-07-24 NOTE — TELEPHONE ENCOUNTER
Patient requesting a 90 day supply  Tamsulosin 0.4 mg    Last time medication was refilled 7/21/23  Quantity and number of refills 30 w/ 0  Last OV 7/20/23 - chronic pain of right knee  Next OV no follow ups on file.

## 2023-08-01 RX ORDER — BLOOD SUGAR DIAGNOSTIC
STRIP MISCELLANEOUS
Qty: 300 STRIP | Refills: 2 | OUTPATIENT
Start: 2023-08-01

## 2023-08-21 ENCOUNTER — TELEPHONE (OUTPATIENT)
Dept: FAMILY MEDICINE CLINIC | Facility: CLINIC | Age: 78
End: 2023-08-21

## 2023-09-01 ENCOUNTER — TELEPHONE (OUTPATIENT)
Dept: FAMILY MEDICINE CLINIC | Facility: CLINIC | Age: 78
End: 2023-09-01

## 2023-09-01 NOTE — TELEPHONE ENCOUNTER
Pts granddaughter Brijesh Garcia called, States vito Benavidez from [facility], did a health and wellness visit over t he phone and they recommended that the pt get a Continuous Glucose Monitor, Dexcom G7. The insurance Dr also  wants to make sure pt is up to date on his vaccinations. Brijesh Jose wants to know if someone could come to the home to do his vaccinations, he is very difficult to transport.

## 2023-09-01 NOTE — TELEPHONE ENCOUNTER
Please advise do you want patient to be on continuous glucose monitor? I do not see that patient is due for vaccines - are there any vaccines you currently recommend for patient.

## 2023-09-02 RX ORDER — LEVOTHYROXINE SODIUM 88 UG/1
88 TABLET ORAL
Qty: 90 TABLET | Refills: 0 | Status: SHIPPED | OUTPATIENT
Start: 2023-09-02

## 2023-09-05 RX ORDER — ACYCLOVIR 400 MG/1
1 TABLET ORAL
Qty: 6 EACH | Refills: 0 | Status: SHIPPED | OUTPATIENT
Start: 2023-09-05

## 2023-09-05 RX ORDER — ACYCLOVIR 400 MG/1
1 TABLET ORAL DAILY
Qty: 1 EACH | Refills: 0 | Status: SHIPPED | OUTPATIENT
Start: 2023-09-05

## 2023-09-05 NOTE — TELEPHONE ENCOUNTER
No VM  Dexcom sent to [facility]  The last time we tried to send dexcom was not covered by ins  We do not do house calls for vaccines

## 2023-09-12 ENCOUNTER — TELEPHONE (OUTPATIENT)
Dept: FAMILY MEDICINE CLINIC | Facility: CLINIC | Age: 78
End: 2023-09-12

## 2023-09-12 DIAGNOSIS — I63.9 CEREBROVASCULAR ACCIDENT (CVA), UNSPECIFIED MECHANISM (HCC): Primary | ICD-10-CM

## 2023-09-13 DIAGNOSIS — E11.22 DIABETES MELLITUS WITH STAGE 3 CHRONIC KIDNEY DISEASE (HCC): ICD-10-CM

## 2023-09-13 DIAGNOSIS — N18.30 DIABETES MELLITUS WITH STAGE 3 CHRONIC KIDNEY DISEASE (HCC): ICD-10-CM

## 2023-09-13 DIAGNOSIS — I10 ESSENTIAL HYPERTENSION WITH GOAL BLOOD PRESSURE LESS THAN 140/90: ICD-10-CM

## 2023-09-13 DIAGNOSIS — E78.2 MIXED HYPERLIPIDEMIA: ICD-10-CM

## 2023-09-13 RX ORDER — MIRTAZAPINE 7.5 MG/1
7.5 TABLET, FILM COATED ORAL NIGHTLY
Qty: 90 TABLET | Refills: 0 | Status: SHIPPED | OUTPATIENT
Start: 2023-09-13

## 2023-09-14 RX ORDER — INSULIN GLARGINE 100 [IU]/ML
40 INJECTION, SOLUTION SUBCUTANEOUS NIGHTLY
Qty: 45 ML | Refills: 0 | Status: SHIPPED | OUTPATIENT
Start: 2023-09-14

## 2023-09-14 RX ORDER — INSULIN LISPRO 100 [IU]/ML
INJECTION, SOLUTION INTRAVENOUS; SUBCUTANEOUS
Qty: 75 ML | Refills: 0 | Status: SHIPPED | OUTPATIENT
Start: 2023-09-14

## 2023-09-15 ENCOUNTER — TELEPHONE (OUTPATIENT)
Dept: FAMILY MEDICINE CLINIC | Facility: CLINIC | Age: 78
End: 2023-09-15

## 2023-09-15 RX ORDER — TAMSULOSIN HYDROCHLORIDE 0.4 MG/1
0.4 CAPSULE ORAL DAILY
Qty: 90 CAPSULE | Refills: 0 | Status: SHIPPED | OUTPATIENT
Start: 2023-09-15

## 2023-09-15 NOTE — TELEPHONE ENCOUNTER
Pt's granddaughter called and states office will be receiving a fax from [facility] stating pt's medications should now be going through them per insurance.  Please advise

## 2023-09-20 RX ORDER — PEN NEEDLE, DIABETIC 29 G X1/2"
NEEDLE, DISPOSABLE MISCELLANEOUS
Qty: 400 EACH | Refills: 0 | Status: SHIPPED | OUTPATIENT
Start: 2023-09-20

## 2023-09-20 RX ORDER — BLOOD SUGAR DIAGNOSTIC
STRIP MISCELLANEOUS
Qty: 300 STRIP | Refills: 0 | Status: SHIPPED | OUTPATIENT
Start: 2023-09-20 | End: 2024-09-19

## 2023-09-20 NOTE — TELEPHONE ENCOUNTER
Fax from [facility], [address] [facility] Requesting Finasteride,  pen needles, Aixa test strips

## 2023-09-20 NOTE — TELEPHONE ENCOUNTER
LOV 7/20/23 for pain. Diabetic Supplies Protocol [id number]/20/2023 11:25 AM    Appointment in the past 12 or next 3 months     DM supplies sent. Medication Quantity Refills Start End   finasteride 5 MG Oral Tab 90 tablet 0 6/29/2023    Sig:   Take 1 tablet (5 mg total) by mouth daily. Route:   Oral       Please advise.     Future Appointments   Date Time Provider Mariluz Sapp   12/12/2023  2:00 PM Mandy Walsh MD [facility] [address] [facility] [address]

## 2023-09-21 ENCOUNTER — TELEPHONE (OUTPATIENT)
Dept: FAMILY MEDICINE CLINIC | Facility: CLINIC | Age: 78
End: 2023-09-21

## 2023-09-21 NOTE — TELEPHONE ENCOUNTER
Overdue imaging  Letter sent  Future Appointments   Date Time Provider Mariluz Senait   12/12/2023  2:00 PM Ct Rodríguez MD [facility] [address] [facility] [address]

## 2023-09-22 RX ORDER — FINASTERIDE 5 MG/1
5 TABLET, FILM COATED ORAL DAILY
Qty: 90 TABLET | Refills: 0 | Status: SHIPPED | OUTPATIENT
Start: 2023-09-22

## 2023-09-25 ENCOUNTER — TELEPHONE (OUTPATIENT)
Dept: FAMILY MEDICINE CLINIC | Facility: CLINIC | Age: 78
End: 2023-09-25

## 2023-09-25 NOTE — TELEPHONE ENCOUNTER
URIEL QUIÑONEZ will be resending a fax regarding RX  dexcom [facility]  Original fax was sent on 9/15/23

## 2023-10-17 DIAGNOSIS — M25.561 CHRONIC PAIN OF RIGHT KNEE: ICD-10-CM

## 2023-10-17 DIAGNOSIS — G89.29 CHRONIC PAIN OF RIGHT KNEE: ICD-10-CM

## 2023-10-17 RX ORDER — ACETAMINOPHEN AND CODEINE PHOSPHATE 300; 30 MG/1; MG/1
1 TABLET ORAL DAILY PRN
Qty: 15 TABLET | Refills: 0 | Status: SHIPPED | OUTPATIENT
Start: 2023-10-17

## 2023-11-06 RX ORDER — TAMSULOSIN HYDROCHLORIDE 0.4 MG/1
0.4 CAPSULE ORAL DAILY
Qty: 90 CAPSULE | Refills: 0 | Status: SHIPPED | OUTPATIENT
Start: 2023-11-06

## 2023-11-06 NOTE — TELEPHONE ENCOUNTER
Routing to provider per protocol. tamsulosin 0.4 MG Oral Cap   Last refilled on 9/15/23 for #90  with 0 rf. Last labs 3/18/23. Last seen on 7/20/23. Future Appointments   Date Time Provider Mariluz Senait   12/12/2023  2:00 PM Ct Rodríguez MD [facility] [address] [facility] [address]          Thank you.

## 2023-11-08 ENCOUNTER — TELEPHONE (OUTPATIENT)
Dept: FAMILY MEDICINE CLINIC | Facility: CLINIC | Age: 78
End: 2023-11-08

## 2023-11-08 NOTE — TELEPHONE ENCOUNTER
Received call from daughter via on call provider service. Called Sandra Elliott who reports father (sharla alvarez) has shallow breathing, a heart rate of 124, and not feeling well. Tested negative for covid. Sandra Elliott is requesting medication to be prescribed. Advised to take patient to nearest ER for assessment of pneumonia vs. heart failure. Sandra Elliott states this will be difficult and unable to do. Advised her to call EMS for transfer. Manoj Liz understanding. Denies further questions at this time.

## 2023-11-09 ENCOUNTER — TELEPHONE (OUTPATIENT)
Dept: FAMILY MEDICINE CLINIC | Facility: CLINIC | Age: 78
End: 2023-11-09

## 2023-11-09 ENCOUNTER — TELEMEDICINE (OUTPATIENT)
Dept: FAMILY MEDICINE CLINIC | Facility: CLINIC | Age: 78
End: 2023-11-09
Payer: MEDICARE

## 2023-11-09 DIAGNOSIS — J06.9 UPPER RESPIRATORY TRACT INFECTION, UNSPECIFIED TYPE: Primary | ICD-10-CM

## 2023-11-09 DIAGNOSIS — U07.1 COVID-19: Primary | ICD-10-CM

## 2023-11-09 PROCEDURE — 99213 OFFICE O/P EST LOW 20 MIN: CPT | Performed by: FAMILY MEDICINE

## 2023-11-09 PROCEDURE — 1159F MED LIST DOCD IN RCRD: CPT | Performed by: FAMILY MEDICINE

## 2023-11-09 PROCEDURE — 1160F RVW MEDS BY RX/DR IN RCRD: CPT | Performed by: FAMILY MEDICINE

## 2023-11-09 RX ORDER — ALBUTEROL SULFATE 90 UG/1
1-2 AEROSOL, METERED RESPIRATORY (INHALATION) EVERY 4 HOURS PRN
Qty: 6.7 G | Refills: 0 | Status: SHIPPED | OUTPATIENT
Start: 2023-11-09

## 2023-11-09 NOTE — TELEPHONE ENCOUNTER
Pt daughter Genoveva called, pt had a vv with dr manzo today,  Dr Manzo asked pt to take another Home Covid test, and call with results.    Genoveva said the covid test is positive.    Can Dr Manzo order Paxlovid for pt.    Send to [facility] on [address].

## 2023-11-09 NOTE — PROGRESS NOTES
Chief Complaint   Patient presents with    Cough        HPI:    Patient ID: Sinai Ghosh is a [de-identified]year old male doing a video visit with his daughter at his side. Cough for 2 days. It is productive and sounds congested. No chest tightness. Also has runny nose and sore throat. Granddaughter who lives in same household tested positive for COVID 3 days ago. Quarantining in basement. Has not taken COVID test.    Lower extremity swelling at baseline. No orthopnea        Review of Systems   Constitutional:  Negative for chills and fever. HENT:  Negative for ear pain, sinus pressure, sinus pain and trouble swallowing. Respiratory:  Negative for shortness of breath and wheezing. Cardiovascular:  Negative for chest pain. Gastrointestinal:  Positive for abdominal pain (earlier today) and diarrhea (1 stool looser than normal). Negative for nausea and vomiting. Genitourinary:  Negative for decreased urine volume. Musculoskeletal:  Negative for myalgias. Neurological:  Negative for weakness and headaches. Current Outpatient Medications   Medication Sig Dispense Refill    tamsulosin 0.4 MG Oral Cap TAKE 1 CAPSULE EVERY DAY 90 capsule 0    acetaminophen-codeine 300-30 MG Oral Tab Take 1 tablet by mouth daily as needed for Pain. 15 tablet 0    finasteride 5 MG Oral Tab Take 1 tablet (5 mg total) by mouth daily. 90 tablet 0    Glucose Blood (ACCU-CHEK KATRIN PLUS) In Vitro Strip Test blood sugar 3 times daily. Diabetes mellitus with stage 3 chronic kidney disease (HCC) E11.22, N18.30 300 strip 0    Insulin Pen Needle (BD PEN NEEDLE ORIGINAL U/F) 29G X 12.7MM Does not apply Misc USE 4 TIMES DAILY FOR INSULIN DEPENDENT TYPE 2 DIABETES 400 each 0    INSULIN LISPRO, 1 UNIT DIAL, 100 UNIT/ML Subcutaneous Solution Pen-injector INJECT SUBCUTANEOUSLY PER SLIDING SCALE.  MAXIMUM DAILY DOSE 75 UNITS DAILY. (DISCARD AND BEGIN A NEW PEN AFTER 28 DAYS) 75 mL 0    LANTUS SOLOSTAR 100 UNIT/ML Subcutaneous Statement Selected Solution Pen-injector INJECT 40 UNITS UNDER THE SKIN NIGHTLY 45 mL 0    mirtazapine 7.5 MG Oral Tab Take 1 tablet (7.5 mg total) by mouth nightly. 90 tablet 0    Continuous Blood Gluc  (DEXCOM G7 ) Does not apply Device 1 each daily. 1 each 0    Continuous Blood Gluc Sensor (DEXCOM G7 SENSOR) Does not apply Misc 1 each every 14 (fourteen) days. 6 each 0    levothyroxine 88 MCG Oral Tab Take 1 tablet (88 mcg total) by mouth before breakfast. 90 tablet 0    oxyCODONE 5 MG Oral Tab Take 1 tablet (5 mg total) by mouth every 4 (four) hours as needed for Pain. bumetanide 1 MG Oral Tab Take 1 tablet (1 mg total) by mouth 2 (two) times daily. 14 tablet 0    pantoprazole 40 MG Oral Tab EC Take 1 tablet (40 mg total) by mouth every morning before breakfast. 180 tablet 0    XARELTO 20 MG Oral Tab TAKE 1 TABLET EVERY DAY  WITH  FOOD 90 tablet 0    ATORVASTATIN 20 MG Oral Tab TAKE 1 TABLET EVERY NIGHT 90 tablet 1    METOPROLOL SUCCINATE ER 25 MG Oral Tablet 24 Hr TAKE 1 TABLET EVERY DAY 90 tablet 0    sucralfate 1 g Oral Tab Take 1 tablet (1 g total) by mouth 3 (three) times daily before meals. 1 tablet 0    Zinc Oxide 12.8 % External Ointment Use as directed 227 each 0    Continuous Blood Gluc Sensor (FREESTYLE SAKINA 2 SENSOR) Does not apply Misc 1 each every 14 (fourteen) days. 2 each 1    Acetaminophen (TYLENOL OR) 650 mg. Continuous Blood Gluc  (FREESTYLE SAKINA 14 DAY READER) Does not apply Device 1 each daily as needed. DX: E11.9. Insulin dependent. 6 each 0    Aspirin Buf,CaCarb-MgCarb-MgO, 81 MG Oral Tab Take 81 mg by mouth daily. Insulin Lispro (HUMALOG KWIKPEN) 100 UNIT/ML Subcutaneous Solution Pen-injector To use per sliding scale. Max daily dose: 75 units daily. 75 mL 0    Cholecalciferol (VITAMIN D-3) 5000 units Oral Tab Take 1 tablet (5,000 Units total) by mouth daily. Allergies:   Allergies   Allergen Reactions    Perfumes UNKNOWN       HISTORY:  Past Medical History: Diagnosis Date    Acute renal failure superimposed on chronic kidney disease, unspecified CKD stage, unspecified acute renal failure type  9/6/2018    Acute, but ill-defined, cerebrovascular disease     Adjustment disorder with depressed mood 3/25/2011    CAD (coronary artery disease) 4/7/2008    Calculus of gallbladder without mention of cholecystitis or obstruction 4/7/2008    Cholelithiasis     Depression     Depressive disorder, not elsewhere classified 6/13/2011    Diarrhea 2/16/2012    Dizziness and giddiness 1/16/2012    Dysuria 1/21/2012    Essential hypertension, benign 2/7/2008    Fecal incontinence 2/16/2012    General weakness 9/6/2018    Heart disease     Hernia     bilateral hernia    High blood pressure     High cholesterol     Insomnia, unspecified 1/16/2012    Ischiorectal abscess     Kidney disease     Knee pain, left     Obesity, unspecified 2/7/2008    Orthostatic hypotension 1/16/2012    Other and unspecified hyperlipidemia 2/7/2008    Pain in joint, lower leg 3/3/2009    PONV (postoperative nausea and vomiting)     motion sickness    Proteinuria 3/8/2008    Stroke ([facility] [address]     Supratherapeutic INR 9/6/2018    Type II or unspecified type diabetes mellitus with renal manifestations, uncontrolled(250.42) 2/7/2008    Type II or unspecified type diabetes mellitus without mention of complication, not stated as uncontrolled 9/10/2010    Unspecified cerebral artery occlusion with cerebral infarction 9/25/2010    Unspecified essential hypertension       Past Surgical History:   Procedure Laterality Date    CHOLECYSTECTOMY      HERNIA SURGERY      KNEE REPLACEMENT SURGERY      arthroscopic knee surgery-left    OTHER SURGICAL HISTORY      stent placement 1998    OTHER SURGICAL HISTORY  6/25/2014    Procedure: LESION WIDE EXCISION WITH SKIN GRAFT;  Surgeon: Kayla Rivas MD;  Location: [address] [place] [address] OR    OTHER SURGICAL HISTORY N/A 7/16/2015    Procedure: CYSTOSCOPY URETEROSCOPY;  Surgeon: Geo Diaz, MD;  Location:  MAIN OR    TONSILLECTOMY        Family History   Problem Relation Age of Onset    Heart Disorder Father     Heart Disorder Mother     Cancer Brother     Cancer Brother     Diabetes Brother       Social History:   Social History     Socioeconomic History    Marital status:    Occupational History    Occupation: retired   Tobacco Use    Smoking status: Former     Types: Cigarettes     Quit date: 1980     Years since quittin.8    Smokeless tobacco: Never    Tobacco comments:     NON-SMOKER   Substance and Sexual Activity    Alcohol use: No    Drug use: No   Other Topics Concern    Seat Belt Yes     Comment: 100%        PHYSICAL EXAM:    There were no vitals taken for this visit. Physical Exam  Constitutional:       General: He is not in acute distress. HENT:      Nose: Rhinorrhea present. Eyes:      Conjunctiva/sclera: Conjunctivae normal.   Pulmonary:      Effort: Pulmonary effort is normal.   Neurological:      Mental Status: He is alert. ASSESSMENT/PLAN:     Encounter Diagnosis   Name Primary? Upper respiratory tract infection, unspecified type Yes   Pt unable to come into office. Recommend doing a home covid test today and calling us back with results today. If positive, pt is interested in starting paxlovid. Renal dosing, risks and benefits discussed. Advised on supportive measures  If sx worsen, go to ER  No orders of the defined types were placed in this encounter.       Meds This Visit:  Requested Prescriptions      No prescriptions requested or ordered in this encounter       Imaging & Referrals:  None

## 2023-11-10 ENCOUNTER — TELEPHONE (OUTPATIENT)
Dept: FAMILY MEDICINE CLINIC | Facility: CLINIC | Age: 78
End: 2023-11-10

## 2023-11-10 NOTE — TELEPHONE ENCOUNTER
Patient's daughter, Monica Bonds, called. Requesting paxlovid rx to be sent to [facility]. Last known GFR of 39.1    Nirmatrelvir 150 mg and ritonavir 100 mg, administered together, twice daily for 5 days. Drug interactions listed between xarelto. Risk Rating X: Avoid combination    Informed daughter that rx will not be called in. Genoveva verbalizes understanding. Genoveva is requesting possible abx as prophylaxis. I will defer to PCP. Genoveva states patient does feel improved today.     We will provide inhaler in the event of shortness of breath  Genoveva verbalizes understanding, will  rx if needed

## 2023-11-10 NOTE — TELEPHONE ENCOUNTER
No answer for pt. Spoke with daughter  She will be going to check on him this AM and call back if not better. Advised to monitor for fever/SOB/worsening cough/new sx/ Pulse O2 < 92  Joint decision made to not start paxlovid if doing better due to interaction with xarelto.

## 2023-11-10 NOTE — TELEPHONE ENCOUNTER
Pt's daughter called and states pt had VV yesterday with DB and has had some wheezing today that he did not have yesterday. Per pt's daughter, she checked his O2 levels and pulse rate and O2 was 98 and pulse rate was 80. Pt's daughter states she thinks wheezing could be due to laying down but just wanted to discuss. Please advise

## 2023-11-10 NOTE — TELEPHONE ENCOUNTER
Patient wheezing today when laying down in bed  Daughter noticed when patient was laying on his side  Patient was not wheezing yesterday  Oxygen at 98%  No SOB or difficulty breathing  Patient now sitting in the chair - and wheezing resolved  No fever  Daughter requesting incentive spirometer  Placed up front for

## 2023-11-17 RX ORDER — METOPROLOL SUCCINATE 25 MG/1
TABLET, EXTENDED RELEASE ORAL
Qty: 90 TABLET | Refills: 10 | Status: SHIPPED | OUTPATIENT
Start: 2023-11-17

## 2023-11-17 NOTE — TELEPHONE ENCOUNTER
LOV 7/20/23 for pain. Hypertension Medications Protocol [id number]/17/2023 02:59 AM   Protocol Details CMP or BMP in past 12 months    Last serum creatinine< 2.0    Appointment in past 6 or next 3 months        External BMP done. Rx sent.

## 2023-11-29 NOTE — TELEPHONE ENCOUNTER
LOV 7/20/23 for nipple pain. Medication Quantity Refills Start End   finasteride 5 MG Oral Tab 90 tablet 0 9/22/2023    Sig:   Take 1 tablet (5 mg total) by mouth daily.      Route:   Oral       Future Appointments   Date Time Provider Mariluz Sapp   12/12/2023  2:00 PM Luiza Baird MD [facility] [address]

## 2023-12-01 RX ORDER — FINASTERIDE 5 MG/1
5 TABLET, FILM COATED ORAL DAILY
Qty: 90 TABLET | Refills: 0 | Status: SHIPPED | OUTPATIENT
Start: 2023-12-01

## 2023-12-12 ENCOUNTER — TELEPHONE (OUTPATIENT)
Dept: NEUROLOGY | Facility: CLINIC | Age: 78
End: 2023-12-12

## 2023-12-12 ENCOUNTER — OFFICE VISIT (OUTPATIENT)
Dept: NEUROLOGY | Facility: CLINIC | Age: 78
End: 2023-12-12
Payer: MEDICARE

## 2023-12-12 VITALS
RESPIRATION RATE: 16 BRPM | DIASTOLIC BLOOD PRESSURE: 72 MMHG | BODY MASS INDEX: 36 KG/M2 | HEART RATE: 86 BPM | SYSTOLIC BLOOD PRESSURE: 136 MMHG | WEIGHT: 275 LBS

## 2023-12-12 DIAGNOSIS — R25.2 SPASTICITY: Primary | ICD-10-CM

## 2023-12-12 DIAGNOSIS — G81.91 RIGHT HEMIPLEGIA (HCC): ICD-10-CM

## 2023-12-12 PROCEDURE — 3075F SYST BP GE 130 - 139MM HG: CPT | Performed by: OTHER

## 2023-12-12 PROCEDURE — 1159F MED LIST DOCD IN RCRD: CPT | Performed by: OTHER

## 2023-12-12 PROCEDURE — 99204 OFFICE O/P NEW MOD 45 MIN: CPT | Performed by: OTHER

## 2023-12-12 PROCEDURE — 3078F DIAST BP <80 MM HG: CPT | Performed by: OTHER

## 2023-12-12 RX ORDER — MIDODRINE HYDROCHLORIDE 5 MG/1
5 TABLET ORAL 3 TIMES DAILY
COMMUNITY

## 2023-12-12 NOTE — PROGRESS NOTES
Pt states had a stroke 14 years ago and has no movement in right arm. Would like to discuss Botox to release right hand.  Pt is accompanied by daughter Juaquin Swanson

## 2023-12-12 NOTE — TELEPHONE ENCOUNTER
Patient in office today for consult with Dr. Nicho Blackwood. Per Dr. Nicho Blackwood, to start PA on Botox EMG guided in [place] office for spasticity in right upper extremity. History of stroke with severe residual hemiparesis    Per Dr. Nicho Blackwood, needing 200 units of Botox    Referral placed.

## 2023-12-18 NOTE — TELEPHONE ENCOUNTER
[facility] [phone] and spoke with Waqas Gaona for CPT codes 24597,27755,21050 no authorization required in office. Call reference #[id number]      has to go through the Medication intake team I was transferred spoke with Shira Christian. She started this on CMM as buy and bill.      Key- #[id number]- finished putting information on CMM this is pending

## 2023-12-19 ENCOUNTER — TELEPHONE (OUTPATIENT)
Dept: FAMILY MEDICINE CLINIC | Facility: CLINIC | Age: 78
End: 2023-12-19

## 2023-12-19 DIAGNOSIS — N18.30 STAGE 3 CHRONIC KIDNEY DISEASE, UNSPECIFIED WHETHER STAGE 3A OR 3B CKD (HCC): ICD-10-CM

## 2023-12-19 DIAGNOSIS — E11.22 DIABETES MELLITUS WITH STAGE 3 CHRONIC KIDNEY DISEASE (HCC): ICD-10-CM

## 2023-12-19 DIAGNOSIS — E66.01 SEVERE OBESITY (BMI 35.0-39.9) WITH COMORBIDITY (HCC): ICD-10-CM

## 2023-12-19 DIAGNOSIS — I63.9 CEREBROVASCULAR ACCIDENT (CVA), UNSPECIFIED MECHANISM (HCC): Primary | ICD-10-CM

## 2023-12-19 DIAGNOSIS — G82.20 PARAPLEGIA (HCC): ICD-10-CM

## 2023-12-19 DIAGNOSIS — N18.30 DIABETES MELLITUS WITH STAGE 3 CHRONIC KIDNEY DISEASE (HCC): ICD-10-CM

## 2023-12-19 DIAGNOSIS — R33.9 URINARY RETENTION: ICD-10-CM

## 2023-12-19 NOTE — TELEPHONE ENCOUNTER
Pt grand daughter called said that pt needs a new rx/ order for the pull up. Per svitlana serna Approved 72 every 3 months. Would like to know if could pull ups can be up to at least 2 a day. Per Brijesh Garcia he does get adult diapers but doesn't use those as much.

## 2023-12-19 NOTE — TELEPHONE ENCOUNTER
[employer] approval #[id number] from 12/18/23-12/31/24    This is buy and bill    Ok to scheduled patient, please notify the PA team of the date.

## 2023-12-19 NOTE — TELEPHONE ENCOUNTER
Spoke to granddaughter  Requesting order for pull ups through [employer]  180 pull ups per 90 days  DME order placed and faxed to [address] at [phone]

## 2024-01-02 NOTE — TELEPHONE ENCOUNTER
Cholesterol Medication Protocol [id number]/02/2024 02:30 PM   Protocol Details ALT < 80    ALT resulted within past year    Lipid panel within past 12 months    Appointment within past 12 or next 3 months

## 2024-01-02 NOTE — TELEPHONE ENCOUNTER
Last OV 7/20/23  Last refilled on 6/1/23 for # 90 with 1 refills  Future Appointments   Date Time Provider Department Center   1/11/2024 11:00 AM Mihaela Vidal MD [facility] [address] [facility] [address]        Thank you.

## 2024-01-04 RX ORDER — ATORVASTATIN CALCIUM 20 MG/1
20 TABLET, FILM COATED ORAL NIGHTLY
Qty: 90 TABLET | Refills: 0 | Status: SHIPPED | OUTPATIENT
Start: 2024-01-04

## 2024-01-11 ENCOUNTER — OFFICE VISIT (OUTPATIENT)
Dept: NEUROLOGY | Facility: CLINIC | Age: 79
End: 2024-01-11
Payer: MEDICARE

## 2024-01-11 VITALS
BODY MASS INDEX: 36 KG/M2 | RESPIRATION RATE: 18 BRPM | HEART RATE: 91 BPM | DIASTOLIC BLOOD PRESSURE: 72 MMHG | SYSTOLIC BLOOD PRESSURE: 124 MMHG | WEIGHT: 275 LBS

## 2024-01-11 DIAGNOSIS — R25.2 SPASTICITY: Primary | ICD-10-CM

## 2024-01-11 DIAGNOSIS — G81.91 RIGHT HEMIPLEGIA (HCC): ICD-10-CM

## 2024-01-11 NOTE — PROGRESS NOTES
Paperwork noting that patient may bear financial responsibility for procedure(s) performed in clinic today signed prior to proceeding with procedure(s).     Furthermore, patient notified that they should contact their insurer to verify that your procedure/test has been approved and is a COVERED benefit.  Although the [facility] staff does its due diligence, the insurance carrier gives the disclaimer that \"Although the procedure is authorized, this does not guarantee payment.\"    Ultimately the patient is responsible for payment.     Botox is:  [x] Buy and Bill  [] Patient Supplied

## 2024-01-11 NOTE — PROCEDURES
Date of service: 1/11/2024  Procedure: Botox injection -chemodenervation  Consent: obtained after explanation of procedure detail, benefits and risks    Indication:   -Increased muscle stiffness in elbow, wrist, and finger muscles with upper limb spasticity    Procedure description:  -Upper Limb Spasticity  Dilution is 200 units/2 ml with 0.9% non-preserved sterile saline with a final concentration of 10 units per 0.1 ml.  A sterile 25 -gauge, 1 inch needle as 0.1 ml (10 units) injection per each site. Injections were divided across specific upper limb muscle areas as specified in the table below. Localization of involved muscles with EMG guidance was used.  Muscle Total Dosage (number of sites)   Biceps 100 units in 4 sites   Flexor Carpi Radialis 25 units in 1 site   Flexor Carpi Ulnaris 25 units in 1 site   Flexor Digitorum Profundus 25 units in 1 site   Flexor Digitorum Sublimis 25 units in 1 site   Total Dose 200  units      The procedure was completed successfully without complication during and after the injections, and the patient tolerated well throughout the procedure.    I have explained the distant spread of toxin effect including asthenia, generalized muscle weakness, diplopia, blurred vision, ptosis, dysphonia, dysarthria, urinary incontinence, and breathing difficulties to the patient, in case of swallowing and breathing difficulties, patient is advised to go emergency room immediately for assistance before my office is called. Patient verbalized understanding.     The recommended re-treatment schedule should be ~12 weeks from today.  RN was present throughout today's office visit.     Mihaela Vidal MD (Michael)  [facility]    Diplomate of Neurology and Headache Medicine     CC: Dennise Hernandez MD

## 2024-01-11 NOTE — PROGRESS NOTES
[facility] Neurology Outpatient Progress Note  Date of service: 1/11/2024    Assessment:   Right UE spasticity  H/o stroke  with severe residual hemiparesis affecting right side     Plan:  Botox 200 units to be injected in right UE under emg guidance, may need 300 units next visit depending his initial response, requested update in early April, side effects, benefits and expectation from botox were reviewed with pt and family prior to injection  Cont current meds  See orders and medications filed with this encounter. The patient indicates understanding of these issues and agrees with the plan.  Discussed with patient/family regarding assessment, work up, care plan   RTC botox 3 months  Pt should go ER for any new or worsening symptoms and contact office   During today's visit for procedure, I spent additional 20 minutes in neuro exam, counseling and review of care plan.  The above stated time was for the E/M portion of the patient's conditions, and exclusive of the pre-, post- and intraservice-time associated with the procedures.   RN was present throughout today's office visit.    Subjective:   History:  Patient here for a follow-up visit for right UE spasticity post stroke. Since last visit his symptoms have not changed.   Here for botox injection.  Per initial visit:  Everett Escobar is a 78 year old male with past medical history as listed below presents here for initial evaluation of rigtht arm/hand spasticity; he states had a stroke 14 years ago and has no movement in right arm. Would like to discuss Botox to release right hand. Pt is accompanied by daughter Genoveva. Pt is wheelchair bound. Some speech difficulty noted.      History/Other:   REVIEW OF SYSTEMS:  A 10-point system was reviewed. Pertinent positives and negatives are noted as above       Current Outpatient Medications:     atorvastatin 20 MG Oral Tab, Take 1 tablet (20 mg total) by mouth nightly., Disp: 90 tablet, Rfl: 0    midodrine 5 MG Oral Tab, Take 1  tablet (5 mg total) by mouth in the morning and 1 tablet (5 mg total) at noon and 1 tablet (5 mg total) in the evening., Disp: , Rfl:     finasteride 5 MG Oral Tab, TAKE 1 TABLET EVERY DAY, Disp: 90 tablet, Rfl: 0    METOPROLOL SUCCINATE ER 25 MG Oral Tablet 24 Hr, TAKE 1 TABLET EVERY DAY, Disp: 90 tablet, Rfl: 10    albuterol 108 (90 Base) MCG/ACT Inhalation Aero Soln, Inhale 1-2 puffs into the lungs every 4 (four) hours as needed for Wheezing or Shortness of Breath., Disp: 6.7 g, Rfl: 0    tamsulosin 0.4 MG Oral Cap, TAKE 1 CAPSULE EVERY DAY, Disp: 90 capsule, Rfl: 0    acetaminophen-codeine 300-30 MG Oral Tab, Take 1 tablet by mouth daily as needed for Pain., Disp: 15 tablet, Rfl: 0    Glucose Blood (ACCU-CHEK KATRIN PLUS) In Vitro Strip, Test blood sugar 3 times daily. Diabetes mellitus with stage 3 chronic kidney disease (HCC) E11.22, N18.30, Disp: 300 strip, Rfl: 0    Insulin Pen Needle (BD PEN NEEDLE ORIGINAL U/F) 29G X 12.7MM Does not apply Misc, USE 4 TIMES DAILY FOR INSULIN DEPENDENT TYPE 2 DIABETES, Disp: 400 each, Rfl: 0    INSULIN LISPRO, 1 UNIT DIAL, 100 UNIT/ML Subcutaneous Solution Pen-injector, INJECT SUBCUTANEOUSLY PER SLIDING SCALE. MAXIMUM DAILY DOSE 75 UNITS DAILY. (DISCARD AND BEGIN A NEW PEN AFTER 28 DAYS), Disp: 75 mL, Rfl: 0    LANTUS SOLOSTAR 100 UNIT/ML Subcutaneous Solution Pen-injector, INJECT 40 UNITS UNDER THE SKIN NIGHTLY, Disp: 45 mL, Rfl: 0    mirtazapine 7.5 MG Oral Tab, Take 1 tablet (7.5 mg total) by mouth nightly., Disp: 90 tablet, Rfl: 0    Continuous Blood Gluc  (DEXCOM G7 ) Does not apply Device, 1 each daily., Disp: 1 each, Rfl: 0    Continuous Blood Gluc Sensor (DEXCOM G7 SENSOR) Does not apply Misc, 1 each every 14 (fourteen) days., Disp: 6 each, Rfl: 0    levothyroxine 88 MCG Oral Tab, Take 1 tablet (88 mcg total) by mouth before breakfast., Disp: 90 tablet, Rfl: 0    oxyCODONE 5 MG Oral Tab, Take 1 tablet (5 mg total) by mouth every 4 (four) hours as  needed for Pain., Disp: , Rfl:     bumetanide 1 MG Oral Tab, Take 1 tablet (1 mg total) by mouth 2 (two) times daily., Disp: 14 tablet, Rfl: 0    pantoprazole 40 MG Oral Tab EC, Take 1 tablet (40 mg total) by mouth every morning before breakfast., Disp: 180 tablet, Rfl: 0    XARELTO 20 MG Oral Tab, TAKE 1 TABLET EVERY DAY  WITH  FOOD, Disp: 90 tablet, Rfl: 0    Continuous Blood Gluc Sensor (FREESTYLE SAKINA 2 SENSOR) Does not apply Misc, 1 each every 14 (fourteen) days., Disp: 2 each, Rfl: 1    Acetaminophen (TYLENOL OR), 650 mg., Disp: , Rfl:     Continuous Blood Gluc  (FREESTYLE SAKINA 14 DAY READER) Does not apply Device, 1 each daily as needed. DX: E11.9.  Insulin dependent., Disp: 6 each, Rfl: 0    Aspirin Buf,CaCarb-MgCarb-MgO, 81 MG Oral Tab, Take 81 mg by mouth daily., Disp: , Rfl:     Cholecalciferol (VITAMIN D-3) 5000 units Oral Tab, Take 1 tablet (5,000 Units total) by mouth daily., Disp: , Rfl:   Allergies:  Allergies   Allergen Reactions    Perfumes UNKNOWN     Past Medical History:   Diagnosis Date    Acute renal failure superimposed on chronic kidney disease, unspecified CKD stage, unspecified acute renal failure type 9/6/2018    Acute, but ill-defined, cerebrovascular disease     Adjustment disorder with depressed mood 3/25/2011    CAD (coronary artery disease) 4/7/2008    Calculus of gallbladder without mention of cholecystitis or obstruction 4/7/2008    Cholelithiasis     Depression     Depressive disorder, not elsewhere classified 6/13/2011    Diarrhea 2/16/2012    Dizziness and giddiness 1/16/2012    Dysuria 1/21/2012    Essential hypertension, benign 2/7/2008    Fecal incontinence 2/16/2012    General weakness 9/6/2018    Heart disease     Hernia     bilateral hernia    High blood pressure     High cholesterol     Insomnia, unspecified 1/16/2012    Ischiorectal abscess     Kidney disease     Knee pain, left     Obesity, unspecified 2/7/2008    Orthostatic hypotension 1/16/2012    Other and  unspecified hyperlipidemia 2008    Pain in joint, lower leg 3/3/2009    PONV (postoperative nausea and vomiting)     motion sickness    Proteinuria 3/8/2008    Stroke (HCC)     Supratherapeutic INR 2018    Type II or unspecified type diabetes mellitus with renal manifestations, uncontrolled(250.42) 2008    Type II or unspecified type diabetes mellitus without mention of complication, not stated as uncontrolled 9/10/2010    Unspecified cerebral artery occlusion with cerebral infarction 2010    Unspecified essential hypertension      Past Surgical History:   Procedure Laterality Date    CHOLECYSTECTOMY      HERNIA SURGERY      KNEE REPLACEMENT SURGERY      arthroscopic knee surgery-left    OTHER SURGICAL HISTORY      stent placement     OTHER SURGICAL HISTORY  2014    Procedure: LESION WIDE EXCISION WITH SKIN GRAFT;  Surgeon: Brandon Andrews MD;  Location:  MAIN OR    OTHER SURGICAL HISTORY N/A 2015    Procedure: CYSTOSCOPY URETEROSCOPY;  Surgeon: Jeremiah Velasquez MD;  Location:  MAIN OR    TONSILLECTOMY       Social History:  Social History     Tobacco Use    Smoking status: Former     Types: Cigarettes     Quit date: 1980     Years since quittin.0    Smokeless tobacco: Never    Tobacco comments:     NON-SMOKER   Substance Use Topics    Alcohol use: No     Family History   Problem Relation Age of Onset    Heart Disorder Father     Heart Disorder Mother     Cancer Brother     Cancer Brother     Diabetes Brother       Objective:   Neurological Examination:  /72 (BP Location: Right arm, Patient Position: Sitting, Cuff Size: adult)   Pulse 91   Resp 18   Wt 275 lb (124.7 kg)   BMI 36.28 kg/m²   Language: normal   Speech: dysarthric  CN: II-XII asymmetric, other intact  Motor strength: right hemiplegic  Tone: spastic in right UE and LE, right hand contracture, spastic, right LE with AFO  DTRs: brisk in right  Coordination: Normal in left hand  Sensory: symmetric    Gait: deferred    Test reviewed on 1/11/2024      Mihaela (Christian Vidal MD  Neurology  [facility]  1/11/2024, 11:06 AM  CC: Dennise Hernandez MD

## 2024-01-11 NOTE — PATIENT INSTRUCTIONS
Refill policies:    Allow 2-3 business days for refills; controlled substances may take longer.  Contact your pharmacy at least 5 days prior to running out of medication and have them send an electronic request or submit request through the “request refill” option in your [employer] account.  Refills are not addressed on weekends; covering physicians do not authorize routine medications on weekends.  No narcotics or controlled substances are refilled after noon on Fridays or by on call physicians.  By law, narcotics must be electronically prescribed.  A 30 day supply with no refills is the maximum allowed.  If your prescription is due for a refill, you may be due for a follow up appointment.  To best provide you care, patients receiving routine medications need to be seen at least once a year.  Patients receiving narcotic/controlled substance medications need to be seen at least once every 3 months.  In the event that your preferred pharmacy does not have the requested medication in stock (e.g. Backordered), it is your responsibility to find another pharmacy that has the requested medication available.  We will gladly send a new prescription to that pharmacy at your request.    Scheduling Tests:    If your physician has ordered radiology tests such as MRI or CT scans, please contact Central Scheduling at [phone] right away to schedule the test.  Once scheduled, the [facility] Centralized Referral Team will work with your insurance carrier to obtain pre-certification or prior authorization.  Depending on your insurance carrier, approval may take 3-10 days.  It is highly recommended patients assure they have received an authorization before having a test performed.  If test is done without insurance authorization, patient may be responsible for the entire amount billed.      Precertification and Prior Authorizations:  If your physician has recommended that you have a procedure or additional testing performed the [facility]  Centralized Referral Team will contact your insurance carrier to obtain pre-certification or prior authorization.    You are strongly encouraged to contact your insurance carrier to verify that your procedure/test has been approved and is a COVERED benefit.  Although the [facility] Centralized Referral Team does its due diligence, the insurance carrier gives the disclaimer that \"Although the procedure is authorized, this does not guarantee payment.\"    Ultimately the patient is responsible for payment.   Thank you for your understanding in this matter.  Paperwork Completion:  If you require FMLA or disability paperwork for your recovery, please make sure to either drop it off or have it faxed to our office at [phone]. Be sure the form has your name and date of birth on it.  The form will be faxed to our Forms Department and they will complete it for you.  There is a 25$ fee for all forms that need to be filled out.  Please be aware there is a 10-14 day turnaround time.  You will need to sign a release of information (MARISA) form if your paperwork does not come with one.  You may call the Forms Department with any questions at [phone].  Their fax number is [phone].

## 2024-01-15 ENCOUNTER — TELEPHONE (OUTPATIENT)
Dept: FAMILY MEDICINE CLINIC | Facility: CLINIC | Age: 79
End: 2024-01-15

## 2024-01-15 NOTE — TELEPHONE ENCOUNTER
DAUGHTER CALLED AND ADV THINKS PT MAY HAVE C-DIFF AGAIN. PT DOES HAVE HX    ADV NOT FORMED STOOL, BLACK, AND VERY FOUL ORDER     LOOKING FOR RECOMMENDATIONS    PLEASE ADV    THANK YOU

## 2024-01-15 NOTE — TELEPHONE ENCOUNTER
Per Nena and Dr Hernandez needs to go to ER  Daughter informed. States that patient won't go to any doctors. Advised again may need imaging or IV fluids/abx. Verbalized understanding.

## 2024-01-17 NOTE — TELEPHONE ENCOUNTER
Fax from [facility] requesting refill   Last refilled 6/29/23  NFA  LOV with DB telemed 11/9/23  Protocol: none

## 2024-01-18 RX ORDER — PANTOPRAZOLE SODIUM 40 MG/1
40 TABLET, DELAYED RELEASE ORAL
Qty: 90 TABLET | Refills: 0 | Status: SHIPPED | OUTPATIENT
Start: 2024-01-18

## 2024-02-12 RX ORDER — RIVAROXABAN 20 MG/1
20 TABLET, FILM COATED ORAL
Qty: 90 TABLET | Refills: 0 | Status: SHIPPED | OUTPATIENT
Start: 2024-02-12

## 2024-02-12 NOTE — TELEPHONE ENCOUNTER
[facility]  Last refilled 6/16/23  Future appt with  4/22/24  LV with DB telemed 11/9/23  Protocol: none  Routed to advise

## 2024-02-28 RX ORDER — FINASTERIDE 5 MG/1
5 TABLET, FILM COATED ORAL DAILY
Qty: 90 TABLET | Refills: 0 | Status: SHIPPED | OUTPATIENT
Start: 2024-02-28

## 2024-02-28 NOTE — TELEPHONE ENCOUNTER
Last refilled 12/1/23  Future appt with  4/22/24  LOV with  7/20/23      Genitourinary Medications [id number]/28/2024 02:03 PM   Protocol Details Patient does not have pulmonary hypertension on problem list    In person appointment or virtual visit in the past 12 mos or appointment in next 3 mos

## 2024-04-01 ENCOUNTER — LAB ENCOUNTER (OUTPATIENT)
Dept: LAB | Facility: HOSPITAL | Age: 79
End: 2024-04-01
Attending: INTERNAL MEDICINE
Payer: MEDICARE

## 2024-04-01 DIAGNOSIS — E78.00 PURE HYPERCHOLESTEROLEMIA: ICD-10-CM

## 2024-04-01 DIAGNOSIS — I51.9 MYXEDEMA HEART DISEASE: ICD-10-CM

## 2024-04-01 DIAGNOSIS — I10 ESSENTIAL HYPERTENSION, MALIGNANT: ICD-10-CM

## 2024-04-01 DIAGNOSIS — E11.9 DIABETES MELLITUS (HCC): Primary | ICD-10-CM

## 2024-04-01 DIAGNOSIS — E03.9 MYXEDEMA HEART DISEASE: ICD-10-CM

## 2024-04-01 LAB
ALBUMIN SERPL-MCNC: 3.3 G/DL (ref 3.4–5)
ALBUMIN/GLOB SERPL: 0.8 {RATIO} (ref 1–2)
ALP LIVER SERPL-CCNC: 131 U/L
ALT SERPL-CCNC: 17 U/L
ANION GAP SERPL CALC-SCNC: 5 MMOL/L (ref 0–18)
AST SERPL-CCNC: 17 U/L (ref 15–37)
BASOPHILS # BLD AUTO: 0.11 X10(3) UL (ref 0–0.2)
BASOPHILS NFR BLD AUTO: 1.1 %
BILIRUB SERPL-MCNC: 1.3 MG/DL (ref 0.1–2)
BUN BLD-MCNC: 33 MG/DL (ref 9–23)
CALCIUM BLD-MCNC: 9 MG/DL (ref 8.5–10.1)
CHLORIDE SERPL-SCNC: 105 MMOL/L (ref 98–112)
CHOLEST SERPL-MCNC: 126 MG/DL (ref ?–200)
CO2 SERPL-SCNC: 31 MMOL/L (ref 21–32)
CREAT BLD-MCNC: 1.71 MG/DL
EGFRCR SERPLBLD CKD-EPI 2021: 40 ML/MIN/1.73M2 (ref 60–?)
EOSINOPHIL # BLD AUTO: 0.41 X10(3) UL (ref 0–0.7)
EOSINOPHIL NFR BLD AUTO: 4.1 %
ERYTHROCYTE [DISTWIDTH] IN BLOOD BY AUTOMATED COUNT: 13 %
EST. AVERAGE GLUCOSE BLD GHB EST-MCNC: 232 MG/DL (ref 68–126)
FASTING PATIENT LIPID ANSWER: YES
FASTING STATUS PATIENT QL REPORTED: YES
GLOBULIN PLAS-MCNC: 4.2 G/DL (ref 2.8–4.4)
GLUCOSE BLD-MCNC: 118 MG/DL (ref 70–99)
HBA1C MFR BLD: 9.7 % (ref ?–5.7)
HCT VFR BLD AUTO: 39 %
HDLC SERPL-MCNC: 57 MG/DL (ref 40–59)
HGB BLD-MCNC: 13.1 G/DL
IMM GRANULOCYTES # BLD AUTO: 0.08 X10(3) UL (ref 0–1)
IMM GRANULOCYTES NFR BLD: 0.8 %
LDLC SERPL CALC-MCNC: 57 MG/DL (ref ?–100)
LYMPHOCYTES # BLD AUTO: 1.84 X10(3) UL (ref 1–4)
LYMPHOCYTES NFR BLD AUTO: 18.4 %
MCH RBC QN AUTO: 32.6 PG (ref 26–34)
MCHC RBC AUTO-ENTMCNC: 33.6 G/DL (ref 31–37)
MCV RBC AUTO: 97 FL
MONOCYTES # BLD AUTO: 0.77 X10(3) UL (ref 0.1–1)
MONOCYTES NFR BLD AUTO: 7.7 %
NEUTROPHILS # BLD AUTO: 6.77 X10 (3) UL (ref 1.5–7.7)
NEUTROPHILS # BLD AUTO: 6.77 X10(3) UL (ref 1.5–7.7)
NEUTROPHILS NFR BLD AUTO: 67.9 %
NONHDLC SERPL-MCNC: 69 MG/DL (ref ?–130)
OSMOLALITY SERPL CALC.SUM OF ELEC: 300 MOSM/KG (ref 275–295)
PLATELET # BLD AUTO: 222 10(3)UL (ref 150–450)
POTASSIUM SERPL-SCNC: 3.2 MMOL/L (ref 3.5–5.1)
PROT SERPL-MCNC: 7.5 G/DL (ref 6.4–8.2)
RBC # BLD AUTO: 4.02 X10(6)UL
SODIUM SERPL-SCNC: 141 MMOL/L (ref 136–145)
TRIGL SERPL-MCNC: 51 MG/DL (ref 30–149)
TSI SER-ACNC: 4.81 MIU/ML (ref 0.36–3.74)
VLDLC SERPL CALC-MCNC: 7 MG/DL (ref 0–30)
WBC # BLD AUTO: 10 X10(3) UL (ref 4–11)

## 2024-04-01 PROCEDURE — 84443 ASSAY THYROID STIM HORMONE: CPT

## 2024-04-01 PROCEDURE — 36415 COLL VENOUS BLD VENIPUNCTURE: CPT

## 2024-04-01 PROCEDURE — 85025 COMPLETE CBC W/AUTO DIFF WBC: CPT

## 2024-04-01 PROCEDURE — 80061 LIPID PANEL: CPT

## 2024-04-01 PROCEDURE — 83036 HEMOGLOBIN GLYCOSYLATED A1C: CPT

## 2024-04-01 PROCEDURE — 80053 COMPREHEN METABOLIC PANEL: CPT

## 2024-04-05 NOTE — TELEPHONE ENCOUNTER
Genitourinary Medications Passed      Finateride 2/28/24 last refill 2/28/24 90 0 refill  Pantoprazole 90 0 refill    Future Appointments   Date Time Provider Department Center   4/15/2024  1:40 PM Mihaela Vidal MD [facility] [address]   4/22/2024  1:00 PM Dennise Hernandez MD [facility] [place]

## 2024-04-06 NOTE — TELEPHONE ENCOUNTER
----- Message from Alpesh Upton sent at 5/25/2018  5:23 AM CDT -----  Regarding: Medication   Contact: [phone]  I just took the last dose of chlomid yesterday.  I know I need another sperm test. Can you please put in an order? What number do I call to schedule the test? My wife is ovulating so we will be waiting a week before I have it done. Will this be ok to miss a few doses? thanks.    Last OV 7/20/23  Last refilled on 9/2/23 for # 90 with 0 refills  Future Appointments   Date Time Provider Department Center   4/15/2024  1:40 PM Mihaela Vidal MD [facility] [address]   4/22/2024  1:00 PM Dennise Hernandez MD [facility] [place]        Thank you.

## 2024-04-06 NOTE — TELEPHONE ENCOUNTER
Thyroid Medication Protocol Failed04/06/2024 08:40 AM   Protocol Details Last TSH value is normal    TSH in past 12 months    In person appointment or virtual visit in the past 12 mos or appointment in next 3 mos

## 2024-04-08 RX ORDER — FINASTERIDE 5 MG/1
5 TABLET, FILM COATED ORAL DAILY
Qty: 90 TABLET | Refills: 3 | Status: SHIPPED | OUTPATIENT
Start: 2024-04-08

## 2024-04-08 RX ORDER — LEVOTHYROXINE SODIUM 88 UG/1
88 TABLET ORAL
Qty: 90 TABLET | Refills: 0 | Status: SHIPPED | OUTPATIENT
Start: 2024-04-08

## 2024-04-08 RX ORDER — PANTOPRAZOLE SODIUM 40 MG/1
40 TABLET, DELAYED RELEASE ORAL
Qty: 90 TABLET | Refills: 3 | Status: SHIPPED | OUTPATIENT
Start: 2024-04-08

## 2024-04-12 ENCOUNTER — TELEPHONE (OUTPATIENT)
Dept: FAMILY MEDICINE CLINIC | Facility: CLINIC | Age: 79
End: 2024-04-12

## 2024-04-12 NOTE — TELEPHONE ENCOUNTER
Called [facility] at [phone] and spoke with ABEL ROMAN states patient's insurance changed the first of the year and they do not cover incontinence supplies  Patient's daughter advised and verbalized understanding - she will contact patient's insurance to see if this would be covered somewhere else.

## 2024-04-12 NOTE — TELEPHONE ENCOUNTER
Pt's daughter called and is requesting pt's incontinence supplies to be placed through [facility] medical supplies. Pt's daughter states he needs diapers, under pads, and adult wipes. Please advise

## 2024-04-15 ENCOUNTER — OFFICE VISIT (OUTPATIENT)
Dept: NEUROLOGY | Facility: CLINIC | Age: 79
End: 2024-04-15
Payer: MEDICARE

## 2024-04-15 VITALS — RESPIRATION RATE: 16 BRPM | HEART RATE: 76 BPM | DIASTOLIC BLOOD PRESSURE: 82 MMHG | SYSTOLIC BLOOD PRESSURE: 130 MMHG

## 2024-04-15 DIAGNOSIS — R25.2 SPASTICITY: Primary | ICD-10-CM

## 2024-04-15 DIAGNOSIS — G81.91 RIGHT HEMIPLEGIA (HCC): ICD-10-CM

## 2024-04-15 RX ORDER — DILTIAZEM HYDROCHLORIDE EXTENDED-RELEASE TABLETS 120 MG/1
120 TABLET, EXTENDED RELEASE ORAL DAILY
COMMUNITY
Start: 2024-01-05

## 2024-04-15 NOTE — PROGRESS NOTES
[facility] Neurology Outpatient Progress Note  Date of service: 4/15/2024    Assessment:     ICD-10-CM    1. Spasticity  R25.2 onabotulinumtoxinA (Botox) injection 200 Units      2. Right hemiplegia (HCC)  G81.91 onabotulinumtoxinA (Botox) injection 200 Units      Right UE spasticity  H/o stroke  with severe residual hemiparesis affecting right side     Plan:  Botox 200 units to be injected in right UE under emg guidance, may increase 300 units if pt /daughter decided to continue ; side effects, benefits and expectation from botox were reviewed with pt and family prior to injection  Cont current meds  See orders and medications filed with this encounter. The patient indicates understanding of these issues and agrees with the plan.  Discussed with patient/family regarding assessment, work up, care plan   RTC 3 months, requested update from pt/daughter re; if they are willing to continue botox at higher dose or not  Pt should go ER for any new or worsening symptoms and contact office   During today's visit for procedure, I spent additional 20 minutes in neuro exam, counseling and review of care plan.  The above stated time was for the E/M portion of the patient's conditions, and exclusive of the pre-, post- and intraservice-time associated with the procedures.   RN was present throughout today's office visit.    Subjective:   History:  Patient here for a follow-up visit for right UE spasticity post stroke. Since last visit his symptoms have not changed much, daughter reports difficulty opening right hand remains unchanged, had discussion re; botox benefits, possible increase of botox, side effect, etc.   Per initial visit:  Everett Escobar is a 78 year old male with past medical history as listed below presents here for initial evaluation of rigtht arm/hand spasticity; he states had a stroke 14 years ago and has no movement in right arm. Would like to discuss Botox to release right hand. Pt is accompanied by daughter Genoveva. Pt  is wheelchair bound. Some speech difficulty noted.      History/Other:   REVIEW OF SYSTEMS:  A 10-point system was reviewed. Pertinent positives and negatives are noted as above       Current Outpatient Medications:     dilTIAZem HCl  MG Oral Tablet 24 Hr, Take 120 mg by mouth daily., Disp: , Rfl:     FINASTERIDE 5 MG Oral Tab, TAKE 1 TABLET EVERY DAY, Disp: 90 tablet, Rfl: 3    PANTOPRAZOLE 40 MG Oral Tab EC, TAKE 1 TABLET EVERY MORNING BEFORE BREAKFAST, Disp: 90 tablet, Rfl: 3    levothyroxine 88 MCG Oral Tab, Take 1 tablet (88 mcg total) by mouth before breakfast., Disp: 90 tablet, Rfl: 0    XARELTO 20 MG Oral Tab, Take 1 tablet (20 mg total) by mouth daily with food., Disp: 90 tablet, Rfl: 0    atorvastatin 20 MG Oral Tab, Take 1 tablet (20 mg total) by mouth nightly., Disp: 90 tablet, Rfl: 0    midodrine 5 MG Oral Tab, Take 1 tablet (5 mg total) by mouth in the morning and 1 tablet (5 mg total) at noon and 1 tablet (5 mg total) in the evening., Disp: , Rfl:     METOPROLOL SUCCINATE ER 25 MG Oral Tablet 24 Hr, TAKE 1 TABLET EVERY DAY, Disp: 90 tablet, Rfl: 10    tamsulosin 0.4 MG Oral Cap, TAKE 1 CAPSULE EVERY DAY, Disp: 90 capsule, Rfl: 0    acetaminophen-codeine 300-30 MG Oral Tab, Take 1 tablet by mouth daily as needed for Pain., Disp: 15 tablet, Rfl: 0    Glucose Blood (ACCU-CHEK KATRIN PLUS) In Vitro Strip, Test blood sugar 3 times daily. Diabetes mellitus with stage 3 chronic kidney disease (HCC) E11.22, N18.30, Disp: 300 strip, Rfl: 0    Insulin Pen Needle (BD PEN NEEDLE ORIGINAL U/F) 29G X 12.7MM Does not apply Misc, USE 4 TIMES DAILY FOR INSULIN DEPENDENT TYPE 2 DIABETES, Disp: 400 each, Rfl: 0    INSULIN LISPRO, 1 UNIT DIAL, 100 UNIT/ML Subcutaneous Solution Pen-injector, INJECT SUBCUTANEOUSLY PER SLIDING SCALE. MAXIMUM DAILY DOSE 75 UNITS DAILY. (DISCARD AND BEGIN A NEW PEN AFTER 28 DAYS), Disp: 75 mL, Rfl: 0    LANTUS SOLOSTAR 100 UNIT/ML Subcutaneous Solution Pen-injector, INJECT 40 UNITS UNDER  THE SKIN NIGHTLY (Patient taking differently: Inject 20 Units into the skin nightly.), Disp: 45 mL, Rfl: 0    mirtazapine 7.5 MG Oral Tab, Take 1 tablet (7.5 mg total) by mouth nightly., Disp: 90 tablet, Rfl: 0    Continuous Blood Gluc  (DEXCOM G7 ) Does not apply Device, 1 each daily., Disp: 1 each, Rfl: 0    Continuous Blood Gluc Sensor (DEXCOM G7 SENSOR) Does not apply Misc, 1 each every 14 (fourteen) days., Disp: 6 each, Rfl: 0    oxyCODONE 5 MG Oral Tab, Take 1 tablet (5 mg total) by mouth every 4 (four) hours as needed for Pain., Disp: , Rfl:     bumetanide 1 MG Oral Tab, Take 1 tablet (1 mg total) by mouth 2 (two) times daily., Disp: 14 tablet, Rfl: 0    Continuous Blood Gluc Sensor (FREESTYLE SAKINA 2 SENSOR) Does not apply Misc, 1 each every 14 (fourteen) days., Disp: 2 each, Rfl: 1    Continuous Blood Gluc  (FREESTYLE SAKINA 14 DAY READER) Does not apply Device, 1 each daily as needed. DX: E11.9.  Insulin dependent., Disp: 6 each, Rfl: 0    Aspirin Buf,CaCarb-MgCarb-MgO, 81 MG Oral Tab, Take 81 mg by mouth daily., Disp: , Rfl:     Cholecalciferol (VITAMIN D-3) 5000 units Oral Tab, Take 1 tablet (5,000 Units total) by mouth daily., Disp: , Rfl:     albuterol 108 (90 Base) MCG/ACT Inhalation Aero Soln, Inhale 1-2 puffs into the lungs every 4 (four) hours as needed for Wheezing or Shortness of Breath. (Patient not taking: Reported on 4/15/2024), Disp: 6.7 g, Rfl: 0  Allergies:  Allergies   Allergen Reactions    Perfumes UNKNOWN     Past Medical History:    Acute renal failure superimposed on chronic kidney disease, unspecified CKD stage, unspecified acute renal failure type    Acute, but ill-defined, cerebrovascular disease    Adjustment disorder with depressed mood    CAD (coronary artery disease)    Calculus of gallbladder without mention of cholecystitis or obstruction    Cholelithiasis    Depression    Depressive disorder, not elsewhere classified    Diarrhea    Dizziness and  giddiness    Dysuria    Essential hypertension, benign    Fecal incontinence    General weakness    Heart disease    Hernia    bilateral hernia    High blood pressure    High cholesterol    Insomnia, unspecified    Ischiorectal abscess    Kidney disease    Knee pain, left    Obesity, unspecified    Orthostatic hypotension    Other and unspecified hyperlipidemia    Pain in joint, lower leg    PONV (postoperative nausea and vomiting)    motion sickness    Proteinuria    Stroke (HCC)    Supratherapeutic INR    Type II or unspecified type diabetes mellitus with renal manifestations, uncontrolled(250.42)    Type II or unspecified type diabetes mellitus without mention of complication, not stated as uncontrolled    Unspecified cerebral artery occlusion with cerebral infarction    Unspecified essential hypertension     Past Surgical History:   Procedure Laterality Date    Cholecystectomy      Hernia surgery      Knee replacement surgery      arthroscopic knee surgery-left    Other surgical history      stent placement     Other surgical history  2014    Procedure: LESION WIDE EXCISION WITH SKIN GRAFT;  Surgeon: Brandon Andrews MD;  Location:  MAIN OR    Other surgical history N/A 2015    Procedure: CYSTOSCOPY URETEROSCOPY;  Surgeon: Jeremiah Velasquez MD;  Location:  MAIN OR    Tonsillectomy       Social History:  Social History     Tobacco Use    Smoking status: Former     Current packs/day: 0.00     Types: Cigarettes     Quit date: 1980     Years since quittin.3    Smokeless tobacco: Never    Tobacco comments:     NON-SMOKER   Substance Use Topics    Alcohol use: Yes     Alcohol/week: 3.0 standard drinks of alcohol     Types: 3 Cans of beer per week     Family History   Problem Relation Age of Onset    Heart Disorder Father     Heart Disorder Mother     Cancer Brother     Cancer Brother     Diabetes Brother      Objective:   Neurological Examination:  /82 (BP Location: Left arm, Patient  Position: Sitting, Cuff Size: adult)   Pulse 76   Resp 16   Language: normal   Speech: dysarthric  CN: II-XII asymmetric, other intact  Motor strength: right hemiplegic  Tone: spastic in right UE and LE, right hand contracture, spastic, right LE with AFO  Coordination: unable to assess in right UE  Gait: deferred    Test reviewed on 4/15/2024      Mihaela Vidal MD (Michael)  Neurology  [facility]  4/15/2024, 2:14 PM  CC: Dennise Hernandez MD

## 2024-04-15 NOTE — PROCEDURES
Date of service: 4/15/2024  Procedure: Botox injection -chemodenervation  Consent: obtained after explanation of procedure detail, benefits and risks     Indication:   -Increased muscle stiffness in elbow, wrist, and finger muscles with upper limb spasticity     Procedure description:  -Upper Limb Spasticity, right  Dilution is 200 units/2 ml with 0.9% non-preserved sterile saline with a final concentration of 10 units per 0.1 ml.  A sterile 25 -gauge, 1 inch needle as 0.1 ml (10 units) injection per each site. Injections were divided across specific upper limb muscle areas as specified in the table below. Localization of involved muscles with EMG guidance was used.  Muscle Total Dosage (number of sites)   Biceps 100 units in 4 sites   Flexor Carpi Radialis 25 units in 1 site   Flexor Carpi Ulnaris 25 units in 1 site   Flexor Digitorum Profundus 25 units in 1 site   Flexor Digitorum Sublimis 25 units in 1 site   Total Dose 200  units      The procedure was completed successfully without complication during and after the injections, and the patient tolerated well throughout the procedure.     I have explained the distant spread of toxin effect including asthenia, generalized muscle weakness, diplopia, blurred vision, ptosis, dysphonia, dysarthria, urinary incontinence, and breathing difficulties to the patient, in case of swallowing and breathing difficulties, patient is advised to go emergency room immediately for assistance before my office is called. Patient verbalized understanding.      The recommended re-treatment schedule should be ~12 weeks from today.  RN was present throughout today's office visit.     Mihaela Vidal MD  [facility]

## 2024-04-15 NOTE — PROGRESS NOTES
Paperwork noting that patient may bear financial responsibility for procedure(s) performed in clinic today signed prior to proceeding with procedure(s).    Furthermore, patient notified that they should contact their insurer to verify that your procedure/test has been approved and is a COVERED benefit.  Although the [facility] staff does its due diligence, the insurance carrier gives the disclaimer that \"Although the procedure is authorized, this does not guarantee payment.\"    Ultimately the patient is responsible for payment.    Botox is:  [x] Buy and Bill  [] Patient Supplied    [x] I have discussed with patient that if their insurance changes they must contact the office right away with that information so that a new prior authorization can be completed.  Patient verbalized understanding that Botox cannot be performed without a current prior authorization in place with correct insurance.

## 2024-04-15 NOTE — PATIENT INSTRUCTIONS
Refill policies:    Allow 2-3 business days for refills; controlled substances may take longer.  Contact your pharmacy at least 5 days prior to running out of medication and have them send an electronic request or submit request through the “request refill” option in your [facility] account.  Refills are not addressed on weekends; covering physicians do not authorize routine medications on weekends.  No narcotics or controlled substances are refilled after noon on Fridays or by on call physicians.  By law, narcotics must be electronically prescribed.  A 30 day supply with no refills is the maximum allowed.  If your prescription is due for a refill, you may be due for a follow up appointment.  To best provide you care, patients receiving routine medications need to be seen at least once a year.  Patients receiving narcotic/controlled substance medications need to be seen at least once every 3 months.  In the event that your preferred pharmacy does not have the requested medication in stock (e.g. Backordered), it is your responsibility to find another pharmacy that has the requested medication available.  We will gladly send a new prescription to that pharmacy at your request.    Scheduling Tests:    If your physician has ordered radiology tests such as MRI or CT scans, please contact Central Scheduling at [phone] right away to schedule the test.  Once scheduled, the [facility] Centralized Referral Team will work with your insurance carrier to obtain pre-certification or prior authorization.  Depending on your insurance carrier, approval may take 3-10 days.  It is highly recommended patients assure they have received an authorization before having a test performed.  If test is done without insurance authorization, patient may be responsible for the entire amount billed.      Precertification and Prior Authorizations:  If your physician has recommended that you have a procedure or additional testing performed the [facility]  Centralized Referral Team will contact your insurance carrier to obtain pre-certification or prior authorization.    You are strongly encouraged to contact your insurance carrier to verify that your procedure/test has been approved and is a COVERED benefit.  Although the [facility] Centralized Referral Team does its due diligence, the insurance carrier gives the disclaimer that \"Although the procedure is authorized, this does not guarantee payment.\"    Ultimately the patient is responsible for payment.   Thank you for your understanding in this matter.  Paperwork Completion:  If you require FMLA or disability paperwork for your recovery, please make sure to either drop it off or have it faxed to our office at [phone]. Be sure the form has your name and date of birth on it.  The form will be faxed to our Forms Department and they will complete it for you.  There is a 25$ fee for all forms that need to be filled out.  Please be aware there is a 10-14 day turnaround time.  You will need to sign a release of information (MARISA) form if your paperwork does not come with one.  You may call the Forms Department with any questions at [phone].  Their fax number is [phone].

## 2024-04-18 ENCOUNTER — TELEPHONE (OUTPATIENT)
Dept: FAMILY MEDICINE CLINIC | Facility: CLINIC | Age: 79
End: 2024-04-18

## 2024-04-18 NOTE — TELEPHONE ENCOUNTER
Please infomr I got labs from Dr. Jacobs office.  His cbc normal/  Tsh mild elevated if he is feeling ok then continue same dose of meds.  Hba1c elevated I do recommend we have him see endocrine since aic not well controlled.  Referral given.  Cholesterol looks good.  His potassium is low is he taking any potassium supplement? Not sure if Dr. Jacobs gave him any?  If not I can send some potassium and repeat bmp in 1 wk.   Kidney functions are stable.

## 2024-04-18 NOTE — TELEPHONE ENCOUNTER
RADHA  Patient's daughter, April advised and verbalized understanding.  Dr. Jacobs has prescribed potassium meds and will be rechecking in 2 weeks

## 2024-04-26 NOTE — TELEPHONE ENCOUNTER
Request for  XARELTO 20 MG Oral Tab No protocol.     LOV 7/20/23 with    Last refill 2/12/24 - 90 tablets 0 refills   Future Appointments   Date Time Provider Department Center   5/21/2024  2:00 PM Dennise Hernandez MD [facility] [place]   7/1/2024  2:40 PM Mihaela Vidal MD [facility] [address] [facility] [address]   Labs 4/1/24

## 2024-04-28 RX ORDER — RIVAROXABAN 20 MG/1
20 TABLET, FILM COATED ORAL
Qty: 90 TABLET | Refills: 3 | Status: SHIPPED | OUTPATIENT
Start: 2024-04-28

## 2024-05-07 ENCOUNTER — TELEPHONE (OUTPATIENT)
Dept: FAMILY MEDICINE CLINIC | Facility: CLINIC | Age: 79
End: 2024-05-07

## 2024-05-13 ENCOUNTER — TELEPHONE (OUTPATIENT)
Dept: FAMILY MEDICINE CLINIC | Facility: CLINIC | Age: 79
End: 2024-05-13

## 2024-05-15 RX ORDER — ATORVASTATIN CALCIUM 20 MG/1
20 TABLET, FILM COATED ORAL NIGHTLY
Qty: 90 TABLET | Refills: 0 | Status: SHIPPED | OUTPATIENT
Start: 2024-05-15

## 2024-05-20 RX ORDER — TAMSULOSIN HYDROCHLORIDE 0.4 MG/1
0.4 CAPSULE ORAL DAILY
Qty: 90 CAPSULE | Refills: 0 | Status: SHIPPED | OUTPATIENT
Start: 2024-05-20

## 2024-05-20 NOTE — TELEPHONE ENCOUNTER
Called pt's granddaughter to reschedule appointment. Patient's granddaughter states he needs a refill of his     tamsulosin 0.4 MG Oral Cap     [facility] - [place], [place] - [phone]

## 2024-05-23 ENCOUNTER — TELEPHONE (OUTPATIENT)
Dept: FAMILY MEDICINE CLINIC | Facility: CLINIC | Age: 79
End: 2024-05-23

## 2024-05-23 NOTE — TELEPHONE ENCOUNTER
Received medical record from [facility] fax # [phone]. Ph. [phone]  Sent to [facility]

## 2024-05-28 ENCOUNTER — LAB ENCOUNTER (OUTPATIENT)
Dept: LAB | Age: 79
End: 2024-05-28
Attending: INTERNAL MEDICINE
Payer: MEDICARE

## 2024-05-28 ENCOUNTER — OFFICE VISIT (OUTPATIENT)
Dept: FAMILY MEDICINE CLINIC | Facility: CLINIC | Age: 79
End: 2024-05-28

## 2024-05-28 VITALS
SYSTOLIC BLOOD PRESSURE: 110 MMHG | HEART RATE: 75 BPM | DIASTOLIC BLOOD PRESSURE: 69 MMHG | TEMPERATURE: 97 F | OXYGEN SATURATION: 98 % | RESPIRATION RATE: 16 BRPM

## 2024-05-28 DIAGNOSIS — R97.20 ELEVATED PSA: ICD-10-CM

## 2024-05-28 DIAGNOSIS — E11.59 HYPERTENSION ASSOCIATED WITH DIABETES (HCC): Chronic | ICD-10-CM

## 2024-05-28 DIAGNOSIS — E03.9 ACQUIRED HYPOTHYROIDISM: ICD-10-CM

## 2024-05-28 DIAGNOSIS — G81.91 RIGHT HEMIPLEGIA (HCC): ICD-10-CM

## 2024-05-28 DIAGNOSIS — N18.32 STAGE 3B CHRONIC KIDNEY DISEASE (HCC): Chronic | ICD-10-CM

## 2024-05-28 DIAGNOSIS — N18.30 DIABETES MELLITUS WITH STAGE 3 CHRONIC KIDNEY DISEASE (HCC): ICD-10-CM

## 2024-05-28 DIAGNOSIS — I48.19 PERSISTENT ATRIAL FIBRILLATION (HCC): ICD-10-CM

## 2024-05-28 DIAGNOSIS — Z00.00 ENCOUNTER FOR ANNUAL HEALTH EXAMINATION: Primary | ICD-10-CM

## 2024-05-28 DIAGNOSIS — L98.421 SKIN ULCER OF SACRUM, LIMITED TO BREAKDOWN OF SKIN (HCC): ICD-10-CM

## 2024-05-28 DIAGNOSIS — N39.498 OTHER URINARY INCONTINENCE: ICD-10-CM

## 2024-05-28 DIAGNOSIS — F43.21 SITUATIONAL DEPRESSION: ICD-10-CM

## 2024-05-28 DIAGNOSIS — E87.6 HYPOKALEMIA: ICD-10-CM

## 2024-05-28 DIAGNOSIS — E11.65 UNCONTROLLED TYPE 2 DIABETES MELLITUS WITH HYPERGLYCEMIA (HCC): ICD-10-CM

## 2024-05-28 DIAGNOSIS — Z99.3 WHEELCHAIR DEPENDENT: ICD-10-CM

## 2024-05-28 DIAGNOSIS — I15.2 HYPERTENSION ASSOCIATED WITH DIABETES (HCC): Chronic | ICD-10-CM

## 2024-05-28 DIAGNOSIS — E78.2 MIXED HYPERLIPIDEMIA: ICD-10-CM

## 2024-05-28 DIAGNOSIS — I10 ESSENTIAL HYPERTENSION WITH GOAL BLOOD PRESSURE LESS THAN 140/90: ICD-10-CM

## 2024-05-28 DIAGNOSIS — E78.5 HYPERLIPIDEMIA ASSOCIATED WITH TYPE 2 DIABETES MELLITUS (HCC): ICD-10-CM

## 2024-05-28 DIAGNOSIS — E11.22 DIABETES MELLITUS WITH STAGE 3 CHRONIC KIDNEY DISEASE (HCC): ICD-10-CM

## 2024-05-28 DIAGNOSIS — I10 HYPERTENSION: Primary | ICD-10-CM

## 2024-05-28 DIAGNOSIS — E11.69 HYPERLIPIDEMIA ASSOCIATED WITH TYPE 2 DIABETES MELLITUS (HCC): ICD-10-CM

## 2024-05-28 DIAGNOSIS — E66.01 SEVERE OBESITY (BMI 35.0-39.9) WITH COMORBIDITY (HCC): ICD-10-CM

## 2024-05-28 PROBLEM — T50.902A INTENTIONAL OVERDOSE, INITIAL ENCOUNTER (HCC): Status: RESOLVED | Noted: 2023-07-20 | Resolved: 2024-05-28

## 2024-05-28 LAB
ANION GAP SERPL CALC-SCNC: 6 MMOL/L (ref 0–18)
BUN BLD-MCNC: 43 MG/DL (ref 9–23)
CALCIUM BLD-MCNC: 9.6 MG/DL (ref 8.5–10.1)
CHLORIDE SERPL-SCNC: 106 MMOL/L (ref 98–112)
CO2 SERPL-SCNC: 28 MMOL/L (ref 21–32)
CREAT BLD-MCNC: 1.81 MG/DL
EGFRCR SERPLBLD CKD-EPI 2021: 37 ML/MIN/1.73M2 (ref 60–?)
FASTING STATUS PATIENT QL REPORTED: YES
GLUCOSE BLD-MCNC: 145 MG/DL (ref 70–99)
OSMOLALITY SERPL CALC.SUM OF ELEC: 303 MOSM/KG (ref 275–295)
POTASSIUM SERPL-SCNC: 3.8 MMOL/L (ref 3.5–5.1)
SODIUM SERPL-SCNC: 140 MMOL/L (ref 136–145)
T4 FREE SERPL-MCNC: 1.3 NG/DL (ref 0.8–1.7)
TSI SER-ACNC: 4.64 MIU/ML (ref 0.36–3.74)

## 2024-05-28 PROCEDURE — 96160 PT-FOCUSED HLTH RISK ASSMT: CPT | Performed by: NURSE PRACTITIONER

## 2024-05-28 PROCEDURE — 1159F MED LIST DOCD IN RCRD: CPT | Performed by: NURSE PRACTITIONER

## 2024-05-28 PROCEDURE — 36415 COLL VENOUS BLD VENIPUNCTURE: CPT

## 2024-05-28 PROCEDURE — 3078F DIAST BP <80 MM HG: CPT | Performed by: NURSE PRACTITIONER

## 2024-05-28 PROCEDURE — 1170F FXNL STATUS ASSESSED: CPT | Performed by: NURSE PRACTITIONER

## 2024-05-28 PROCEDURE — 84443 ASSAY THYROID STIM HORMONE: CPT

## 2024-05-28 PROCEDURE — 80048 BASIC METABOLIC PNL TOTAL CA: CPT

## 2024-05-28 PROCEDURE — 3074F SYST BP LT 130 MM HG: CPT | Performed by: NURSE PRACTITIONER

## 2024-05-28 PROCEDURE — 84439 ASSAY OF FREE THYROXINE: CPT

## 2024-05-28 PROCEDURE — 1125F AMNT PAIN NOTED PAIN PRSNT: CPT | Performed by: NURSE PRACTITIONER

## 2024-05-28 PROCEDURE — 1160F RVW MEDS BY RX/DR IN RCRD: CPT | Performed by: NURSE PRACTITIONER

## 2024-05-28 PROCEDURE — G0439 PPPS, SUBSEQ VISIT: HCPCS | Performed by: NURSE PRACTITIONER

## 2024-05-28 PROCEDURE — 99499 UNLISTED E&M SERVICE: CPT | Performed by: NURSE PRACTITIONER

## 2024-05-28 RX ORDER — INSULIN GLARGINE 100 [IU]/ML
20 INJECTION, SOLUTION SUBCUTANEOUS NIGHTLY
COMMUNITY
Start: 2024-05-28

## 2024-05-28 RX ORDER — POTASSIUM CHLORIDE 750 MG/1
TABLET, EXTENDED RELEASE ORAL
COMMUNITY
Start: 2024-04-15

## 2024-05-28 RX ORDER — SERTRALINE HYDROCHLORIDE 25 MG/1
25 TABLET, FILM COATED ORAL DAILY
Qty: 90 TABLET | Refills: 0 | Status: SHIPPED | OUTPATIENT
Start: 2024-05-28 | End: 2024-08-26

## 2024-05-28 NOTE — PROGRESS NOTES
Subjective:   Everett Escobar is a 79 year old male who presents for a MA (Medicare Advantage) Supervisit (Once per calendar year) and scheduled follow up of multiple significant but stable problems.     Patient is here with daughter.   Would like to discuss DNR paperwork. He wishes to be DNR.   He is depressed. Not suicidal but states he 'doesn't want to live anymore.' Misses his wife and feels like he is burden on his family. He does have history of intentional insulin overdose in 2023. He states he would not act on any of this. Daughter manages his medications. He is agreeable to antidepressant though.    Endocrinology: Does not watch diet and does not want to see diabetes specialist  Nephrology: does not want to see specialist anymore  Cardiology: Dr. Jacobs  Neurology: Dr. Vidal, may not be going back though  Podiatry: comes to his home every 3-4 months    Scheduled to see  for diabetic footwear    Daughter asking for home health again. Has recurrent sacral pressure wound. Slight bleeding recently. Needs help with wound care. Unable to examine today due to patient's physical limitations.    Lab Results   Component Value Date    A1C 9.7 (H) 04/01/2024    A1C 7.1 (H) 04/22/2022    A1C 6.5 (H) 09/24/2021    A1C 6.7 (H) 03/22/2021    A1C 6.3 (H) 07/20/2020       Recent Results (from the past 4380 hour(s))   Comp Metabolic Panel (14)    Collection Time: 04/01/24  1:07 PM   Result Value Ref Range    Glucose 118 (H) 70 - 99 mg/dL    Sodium 141 136 - 145 mmol/L    Potassium 3.2 (L) 3.5 - 5.1 mmol/L    Chloride 105 98 - 112 mmol/L    CO2 31.0 21.0 - 32.0 mmol/L    Anion Gap 5 0 - 18 mmol/L    BUN 33 (H) 9 - 23 mg/dL    Creatinine 1.71 (H) 0.70 - 1.30 mg/dL    Calcium, Total 9.0 8.5 - 10.1 mg/dL    Calculated Osmolality 300 (H) 275 - 295 mOsm/kg    eGFR-Cr 40 (L) >=60 mL/min/1.73m2    AST 17 15 - 37 U/L    ALT 17 16 - 61 U/L    Alkaline Phosphatase 131 (H) 45 - 117 U/L    Bilirubin, Total 1.3 0.1 - 2.0 mg/dL    Total  Protein 7.5 6.4 - 8.2 g/dL    Albumin 3.3 (L) 3.4 - 5.0 g/dL    Globulin  4.2 2.8 - 4.4 g/dL    A/G Ratio 0.8 (L) 1.0 - 2.0    Patient Fasting for CMP? Yes       Cholesterol: 126, done on 4/1/2024.  HDL Cholesterol: 57, done on 4/1/2024.  LDL Cholesterol: 57, done on 4/1/2024.  TriGlycerides 51, done on 4/1/2024.       History/Other:   Fall Risk Assessment:   He has been screened for Falls and is High Risk. Fall Prevention information provided to patient in After Visit Summary.    Do you feel unsteady when standing or walking?: Yes  Do you worry about falling?: Yes  Have you fallen in the past year?: No  How many times have you fallen?: 1  Were you injured?: Yes     Cognitive Assessment:   Abnormal  What day of the week is this?: Correct  What month is it?: Incorrect  What year is it?: Correct  Recall \"Ball\": Correct  Recall \"Flag\": Correct  Recall \"Tree\": Correct    Functional Ability/Status:   Everett Escobar has some abnormal functions as listed below:  He has Dressing and/or Bathing issues based on screening of functional status.  Difficulty dressing or bathing?: Yes  Bathing or Showering: Cannot do without help  Dressing: Cannot do without help  He has Toileting difficulties based on screening of functional status.He has Driving difficulties based on screening of functional status. He has Meal Preparation difficulties based on screening of functional status.He has difficulties Managing Money/Bills based on screening of functional status.He has difficulties Shopping for Groceries based on screening of functional status. He has difficulties Taking Meds as Rx'd based on screening of functional status. He has Hearing problems based on screening of functional status.He has Vision problems based on screening of functional status. He has Walking problems based on screening of functional status. He has problems with Daily Activities based on screening of functional status. He has problems with Memory based on screening  of functional status.       Depression Screening (PHQ-2/PHQ-9): PHQ-2 SCORE: 0  , done 5/28/2024      Advanced Directives:   He has a Living Will on file in [facility]; reviewed and discussed documents with patient (and family/surrogate if present).  He does have a POA but we do NOT have it on file in Epic.    Discussed Advance Care Planning with patient and patient declined resource information.      Patient Active Problem List   Diagnosis    Persistent atrial fibrillation (HCC)    Severe obesity (BMI 35.0-39.9) with comorbidity (HCC)    Hypertension associated with diabetes (HCC)    Stage 3 chronic kidney disease (HCC)    Elevated PSA    Hyperlipidemia associated with type 2 diabetes mellitus (HCC)    Right hemiplegia (HCC)    Spasticity    Mixed hyperlipidemia    Essential hypertension with goal blood pressure less than 140/90    Diabetes mellitus with stage 3 chronic kidney disease (HCC)     Allergies:  He is allergic to perfumes.    Current Medications:  Outpatient Medications Marked as Taking for the 5/28/24 encounter (Office Visit) with Wendy Mckee APRN   Medication Sig    potassium chloride 10 MEQ Oral Tab CR     LANTUS SOLOSTAR 100 UNIT/ML Subcutaneous Solution Pen-injector Inject 20 Units into the skin nightly.    sertraline 25 MG Oral Tab Take 1 tablet (25 mg total) by mouth daily.    tamsulosin 0.4 MG Oral Cap Take 1 capsule (0.4 mg total) by mouth daily.    atorvastatin 20 MG Oral Tab TAKE 1 TABLET EVERY NIGHT    XARELTO 20 MG Oral Tab Take 1 tablet (20 mg total) by mouth daily with food.    dilTIAZem HCl  MG Oral Tablet 24 Hr Take 120 mg by mouth daily.    FINASTERIDE 5 MG Oral Tab TAKE 1 TABLET EVERY DAY    PANTOPRAZOLE 40 MG Oral Tab EC TAKE 1 TABLET EVERY MORNING BEFORE BREAKFAST    levothyroxine 88 MCG Oral Tab Take 1 tablet (88 mcg total) by mouth before breakfast.    METOPROLOL SUCCINATE ER 25 MG Oral Tablet 24 Hr TAKE 1 TABLET EVERY DAY    INSULIN LISPRO, 1 UNIT DIAL, 100 UNIT/ML  Subcutaneous Solution Pen-injector INJECT SUBCUTANEOUSLY PER SLIDING SCALE. MAXIMUM DAILY DOSE 75 UNITS DAILY. (DISCARD AND BEGIN A NEW PEN AFTER 28 DAYS)    oxyCODONE 5 MG Oral Tab Take 1 tablet (5 mg total) by mouth every 4 (four) hours as needed for Pain.    bumetanide 1 MG Oral Tab Take 1 tablet (1 mg total) by mouth 2 (two) times daily.    Aspirin Buf,CaCarb-MgCarb-MgO, 81 MG Oral Tab Take 81 mg by mouth daily.    Cholecalciferol (VITAMIN D-3) 5000 units Oral Tab Take 1 tablet (5,000 Units total) by mouth daily.       Medical History:  He  has a past medical history of Acute renal failure superimposed on chronic kidney disease, unspecified CKD stage, unspecified acute renal failure type (09/06/2018), Acute, but ill-defined, cerebrovascular disease, Adjustment disorder with depressed mood (03/25/2011), CAD (coronary artery disease) (04/07/2008), Calculus of gallbladder without mention of cholecystitis or obstruction (04/07/2008), Cholelithiasis, Depression, Depressive disorder, not elsewhere classified (06/13/2011), Diarrhea (02/16/2012), Dizziness and giddiness (01/16/2012), Dysuria (01/21/2012), Essential hypertension, benign (02/07/2008), Fecal incontinence (02/16/2012), General weakness (09/06/2018), Heart disease, Hernia, High blood pressure, High cholesterol, Insomnia, unspecified (01/16/2012), Intentional overdose, initial encounter ([facility]) (07/20/2023), Ischiorectal abscess, Kidney disease, Knee pain, left, Obesity, unspecified (02/07/2008), Orthostatic hypotension (01/16/2012), Other and unspecified hyperlipidemia (02/07/2008), Pain in joint, lower leg (03/03/2009), PONV (postoperative nausea and vomiting), Proteinuria (03/08/2008), Stroke ([facility]), Supratherapeutic INR (09/06/2018), Type II or unspecified type diabetes mellitus with renal manifestations, uncontrolled(250.42) (02/07/2008), Type II or unspecified type diabetes mellitus without mention of complication, not stated as uncontrolled (09/10/2010),  Unspecified cerebral artery occlusion with cerebral infarction (2010), and Unspecified essential hypertension.  Surgical History:  He  has a past surgical history that includes hernia surgery; knee replacement surgery; tonsillectomy; other surgical history; cholecystectomy; other surgical history (2014); and other surgical history (N/A, 2015).   Family History:  His family history includes Cancer in his brother and brother; Diabetes in his brother; Heart Disorder in his father and mother.  Social History:  He  reports that he quit smoking about 44 years ago. His smoking use included cigarettes. He has never used smokeless tobacco. He reports current alcohol use of about 3.0 standard drinks of alcohol per week. He reports that he does not use drugs.    Tobacco:  He smoked tobacco in the past but quit greater than 12 months ago.  Social History     Tobacco Use   Smoking Status Former    Current packs/day: 0.00    Types: Cigarettes    Quit date: 1980    Years since quittin.4   Smokeless Tobacco Never   Tobacco Comments    NON-SMOKER          CAGE Alcohol Screen:   CAGE screening score of 0 on 2024, showing low risk of alcohol abuse.      Patient Care Team:  Dennise Hernandez MD as PCP - General  Mihaela Vidal MD as Consulting Physician (NEUROLOGY)  Alexander Jacobs MD (CARDIOLOGY)    Review of Systems   Constitutional:  Positive for appetite change (decreased). Negative for chills, fatigue, fever and unexpected weight change.   HENT:  Positive for rhinorrhea (chronic). Negative for congestion, ear pain, postnasal drip, sore throat and trouble swallowing.    Respiratory:  Positive for cough (once in awhile). Negative for shortness of breath.    Cardiovascular:  Positive for chest pain (once in awhile, sees cardiology, declines work up). Negative for palpitations.   Gastrointestinal:  Negative for abdominal pain, constipation, diarrhea, nausea and vomiting.   Endocrine: Positive for polyuria.  Negative for cold intolerance, heat intolerance, polydipsia and polyphagia.   Genitourinary:  Negative for dysuria and frequency.        + urinary incontinence   Musculoskeletal:  Negative for myalgias.   Skin:  Negative for rash.   Neurological:  Positive for weakness and headaches (occasionally).   Psychiatric/Behavioral:  Positive for dysphoric mood and sleep disturbance. Negative for suicidal ideas. The patient is not nervous/anxious.      Objective:   Physical Exam  Constitutional:       General: He is not in acute distress.     Appearance: Normal appearance. He is well-developed and well-groomed. He is obese. He is not ill-appearing.   HENT:      Right Ear: Tympanic membrane, ear canal and external ear normal.      Left Ear: Tympanic membrane, ear canal and external ear normal.      Nose: Nose normal.      Mouth/Throat:      Mouth: Mucous membranes are moist.      Pharynx: Oropharynx is clear.   Eyes:      Pupils: Pupils are equal, round, and reactive to light.   Neck:      Thyroid: No thyromegaly.   Cardiovascular:      Rate and Rhythm: Normal rate and regular rhythm.      Heart sounds: Normal heart sounds.   Pulmonary:      Effort: Pulmonary effort is normal.      Breath sounds: Normal breath sounds.   Abdominal:      General: Bowel sounds are normal.      Palpations: Abdomen is soft.      Tenderness: There is no abdominal tenderness. There is no guarding.   Musculoskeletal:         General: Normal range of motion.      Cervical back: Normal range of motion.   Skin:     General: Skin is warm and dry.   Neurological:      Mental Status: He is alert and oriented to person, place, and time. Mental status is at baseline.      Sensory: Sensory deficit present.      Gait: Gait abnormal (wheelchair bound).   Psychiatric:         Mood and Affect: Mood normal.         Behavior: Behavior normal.     Bilateral barefoot skin diabetic exam is abnormal with diabetic monofilament/sensation testing abnormal, callus - with  deeper color changes, and dystrophic nails and/or dry skin   /69   Pulse 75   Temp 96.9 °F (36.1 °C)   Resp 16   SpO2 98%  Estimated body mass index is 36.28 kg/m² as calculated from the following:    Height as of 7/20/23: 6' 1\" (1.854 m).    Weight as of 1/11/24: 275 lb (124.7 kg).    Medicare Hearing Assessment:   Hearing Screening    Time taken: 5/28/2024 11:37 AM  Screening Method: Finger Rub  Finger Rub Result: Fail               Assessment & Plan:   Everett Escobar is a 79 year old male who presents for a Medicare Assessment.     1. Encounter for annual health examination (Primary)  2. Diabetes mellitus with stage 3 chronic kidney disease (HCC)  Comments:  will consider endo evaluation  Orders:  -     Hemoglobin A1C; Future; Expected date: 08/28/2024  -     Endocrine Referral - In Network  -     [facility]  3. Essential hypertension with goal blood pressure less than 140/90  Comments:  following with cardiology  4. Mixed hyperlipidemia  Comments:  following with cardiology  5. Severe obesity (BMI 35.0-39.9) with comorbidity (HCC)  -     [facility]  6. Persistent atrial fibrillation (HCC)  Comments:  following with cardiology  Overview:  Taking asa, coumadin, amiodarone, cardizem, metoprolol. Managed by cardiology.     7. Hyperlipidemia associated with type 2 diabetes mellitus (HCC)  Comments:  following with cardiology  8. Hypertension associated with diabetes (HCC)  Comments:  following with cardiology  9. Right hemiplegia (HCC)  10. Stage 3b chronic kidney disease (HCC)  Comments:  declines nephrology consult  11. Elevated PSA  Comments:  declines further work up  12. Acquired hypothyroidism  Comments:  recheck TSH today and make dose adjustment as needed  Orders:  -     TSH and Free T4; Future; Expected date: 05/28/2024  13. Other urinary incontinence  -     [facility]  14. Situational depression  Comments:  start SSRI and call with  update in 2-3 weeks  Orders:  -     Sertraline HCl; Take 1 tablet (25 mg total) by mouth daily.  Dispense: 90 tablet; Refill: 0  15. Uncontrolled type 2 diabetes mellitus with hyperglycemia (HCC)  Comments:  will consider endo evaluation  Orders:  -     Endocrine Referral - In Network  -     [facility]  16. Wheelchair dependent  -     [facility]  17. Skin ulcer of sacrum, limited to breakdown of skin (HCC)  Comments:  resume home health  Orders:  -     [facility]  The patient indicates understanding of these issues and agrees to the plan.  Consult ordered.  Lab work ordered.  Prescription medication ordered.  Reinforced healthy diet, lifestyle, and exercise.      Return in 3 months (on 8/28/2024).     Wendy Mckee, BRIAN, 5/28/2024     Supplementary Documentation:   General Health:  In the past six months, have you lost more than 10 pounds without trying?: 1 - Yes  Has your appetite been poor?: Yes  Type of Diet: Balanced;Diabetic  How does the patient maintain a good energy level?: Other (going outside)  How would you describe your daily physical activity?: None  How would you describe your current health state?: Fair  How do you maintain positive mental well-being?: Visiting Friends  On a scale of 0 to 10, with 0 being no pain and 10 being severe pain, what is your pain level?: 5 - (Moderate)  In the past six months, have you experienced urine leakage?: 1-Yes  At any time do you feel concerned for the safety/well-being of yourself and/or your children, in your home or elsewhere?: No  Have you had any immunizations at another office such as Influenza, Hepatitis B, Tetanus, or Pneumococcal?: No          Everett Escobar's SCREENING SCHEDULE   Tests on this list are recommended by your physician but may not be covered, or covered at this frequency, by your insurer.   Please check with your insurance carrier before scheduling to verify coverage.    PREVENTATIVE SERVICES FREQUENCY &  COVERAGE DETAILS LAST COMPLETION DATE   Diabetes Screening    Fasting Blood Sugar / Glucose    One screening every 12 months if never tested or if previously tested but not diagnosed with pre-diabetes   One screening every 6 months if diagnosed with pre-diabetes Lab Results   Component Value Date     (H) 04/01/2024        Cardiovascular Disease Screening    Lipid Panel  Cholesterol  Lipoprotein (HDL)  Triglycerides Covered every 5 years for all Medicare beneficiaries without apparent signs or symptoms of cardiovascular disease Lab Results   Component Value Date    CHOLEST 126 04/01/2024    HDL 57 04/01/2024    LDL 57 04/01/2024    TRIG 51 04/01/2024         Electrocardiogram (EKG)   Covered if needed at Welcome to Medicare, and non-screening if indicated for medical reasons 07/15/2015      Ultrasound Screening for Abdominal Aortic Aneurysm (AAA) Covered once in a lifetime for one of the following risk factors    Men who are 65-75 years old and have ever smoked    Anyone with a family history -     Colorectal Cancer Screening  Covered for ages 50-85; only need ONE of the following:    Colonoscopy   Covered every 10 years    Covered every 2 years if patient is at high risk or previous colonoscopy was abnormal -    No recommendations at this time    Flexible Sigmoidoscopy   Covered every 4 years -    Fecal Occult Blood Test Covered annually -   Prostate Cancer Screening    Prostate-Specific Antigen (PSA) Annually No results found for: \"PSA\"  There are no preventive care reminders to display for this patient.   Immunizations    Influenza Covered once per flu season  Please get every year -  No recommendations at this time    Pneumococcal Each vaccine ([id number] & [id number]) covered once after 65 Prevnar 13: 09/05/2017    [id number]: 03/03/2009     No recommendations at this time    Hepatitis B One screening covered for patients with certain risk factors   -  No  recommendations at this time    Tetanus Toxoid Not covered by Medicare Part B unless medically necessary (cut with metal); may be covered with your pharmacy prescription benefits -    Tetanus, Diptheria and Pertusis TD and TDaP Not covered by Medicare Part B -  No recommendations at this time    Zoster Not covered by Medicare Part B; may be covered with your pharmacy  prescription benefits -  No recommendations at this time     Diabetes      Hemoglobin A1C Annually; if last result is elevated, may be asked to retest more frequently.    Medicare covers every 3 months Lab Results   Component Value Date     (H) 04/01/2024    A1C 9.7 (H) 04/01/2024       No recommendations at this time    Creat/alb ratio Annually Lab Results   Component Value Date    MICROALBCREA 103.0 (H) 07/12/2018       LDL Annually Lab Results   Component Value Date    LDL 57 04/01/2024       Dilated Eye Exam Annually Last Diabetic Eye Exam:  Last Dilated Eye Exam: 05/07/24  Eye Exam shows Diabetic Retinopathy?: Yes       Annual Monitoring of Persistent Medications (ACE/ARB, digoxin diuretics, anticonvulsants)    Potassium Annually Lab Results   Component Value Date    K 3.2 (L) 04/01/2024         Creatinine   Annually Lab Results   Component Value Date    CREATSERUM 1.71 (H) 04/01/2024         BUN Annually Lab Results   Component Value Date    BUN 33 (H) 04/01/2024       Drug Serum Conc Annually No results found for: \"DIGOXIN\", \"DIG\", \"VALP\"

## 2024-05-28 NOTE — PATIENT INSTRUCTIONS
Everett Escobar's SCREENING SCHEDULE   Tests on this list are recommended by your physician but may not be covered, or covered at this frequency, by your insurer.   Please check with your insurance carrier before scheduling to verify coverage.   PREVENTATIVE SERVICES FREQUENCY &  COVERAGE DETAILS LAST COMPLETION DATE   Diabetes Screening    Fasting Blood Sugar / Glucose    One screening every 12 months if never tested or if previously tested but not diagnosed with pre-diabetes   One screening every 6 months if diagnosed with pre-diabetes Lab Results   Component Value Date     (H) 04/01/2024        Cardiovascular Disease Screening    Lipid Panel  Cholesterol  Lipoprotein (HDL)  Triglycerides Covered every 5 years for all Medicare beneficiaries without apparent signs or symptoms of cardiovascular disease Lab Results   Component Value Date    CHOLEST 126 04/01/2024    HDL 57 04/01/2024    LDL 57 04/01/2024    TRIG 51 04/01/2024         Electrocardiogram (EKG)   Covered if needed at Welcome to Medicare, and non-screening if indicated for medical reasons 07/15/2015      Ultrasound Screening for Abdominal Aortic Aneurysm (AAA) Covered once in a lifetime for one of the following risk factors   • Men who are 65-75 years old and have ever smoked   • Anyone with a family history -     Colorectal Cancer Screening  Covered for ages 50-85; only need ONE of the following:    Colonoscopy   Covered every 10 years    Covered every 2 years if patient is at high risk or previous colonoscopy was abnormal -    No recommendations at this time    Flexible Sigmoidoscopy   Covered every 4 years -    Fecal Occult Blood Test Covered annually -   Prostate Cancer Screening    Prostate-Specific Antigen (PSA) Annually No results found for: \"PSA\"  There are no preventive care reminders to display for this patient.   Immunizations    Influenza Covered once per flu season  Please get every year -  No recommendations at this time     Pneumococcal Each vaccine (Mrahslh89 & Blicgxjep37) covered once after 65 Prevnar 13: 09/05/2017    Rvwboniqt38: 03/03/2009     No recommendations at this time    Hepatitis B One screening covered for patients with certain risk factors   -  No recommendations at this time    Tetanus Toxoid Not covered by Medicare Part B unless medically necessary (cut with metal); may be covered with your pharmacy prescription benefits -    Tetanus, Diptheria and Pertusis TD and TDaP Not covered by Medicare Part B -  No recommendations at this time    Zoster Not covered by Medicare Part B; may be covered with your pharmacy  prescription benefits -  Zoster Vaccines(1 of 2) Never done     Diabetes      Hemoglobin A1C Annually; if last result is elevated, may be asked to retest more frequently.    Medicare covers every 3 months Lab Results   Component Value Date     (H) 04/01/2024    A1C 9.7 (H) 04/01/2024       No recommendations at this time    Creat/alb ratio Annually Lab Results   Component Value Date    MICROALBCREA 103.0 (H) 07/12/2018       LDL Annually Lab Results   Component Value Date    LDL 57 04/01/2024       Dilated Eye Exam Annually [unfilled]     Annual Monitoring of Persistent Medications (ACE/ARB, digoxin diuretics, anticonvulsants)    Potassium Annually Lab Results   Component Value Date    K 3.2 (L) 04/01/2024         Creatinine   Annually Lab Results   Component Value Date    CREATSERUM 1.71 (H) 04/01/2024         BUN Annually Lab Results   Component Value Date    BUN 33 (H) 04/01/2024       Drug Serum Conc Annually No results found for: \"DIGOXIN\", \"DIG\", \"VALP\"         Everett Escobar's SCREENING SCHEDULE   Tests on this list are recommended by your physician but may not be covered, or covered at this frequency, by your insurer.   Please check with your insurance carrier before scheduling to verify coverage.   PREVENTATIVE SERVICES FREQUENCY &  COVERAGE DETAILS LAST COMPLETION DATE   Diabetes  Screening    Fasting Blood Sugar / Glucose    One screening every 12 months if never tested or if previously tested but not diagnosed with pre-diabetes   One screening every 6 months if diagnosed with pre-diabetes Lab Results   Component Value Date     (H) 04/01/2024        Cardiovascular Disease Screening    Lipid Panel  Cholesterol  Lipoprotein (HDL)  Triglycerides Covered every 5 years for all Medicare beneficiaries without apparent signs or symptoms of cardiovascular disease Lab Results   Component Value Date    CHOLEST 126 04/01/2024    HDL 57 04/01/2024    LDL 57 04/01/2024    TRIG 51 04/01/2024         Electrocardiogram (EKG)   Covered if needed at Welcome to Medicare, and non-screening if indicated for medical reasons 07/15/2015      Ultrasound Screening for Abdominal Aortic Aneurysm (AAA) Covered once in a lifetime for one of the following risk factors   • Men who are 65-75 years old and have ever smoked   • Anyone with a family history -     Colorectal Cancer Screening  Covered for ages 50-85; only need ONE of the following:    Colonoscopy   Covered every 10 years    Covered every 2 years if patient is at high risk or previous colonoscopy was abnormal -    No recommendations at this time    Flexible Sigmoidoscopy   Covered every 4 years -    Fecal Occult Blood Test Covered annually -   Prostate Cancer Screening    Prostate-Specific Antigen (PSA) Annually No results found for: \"PSA\"  There are no preventive care reminders to display for this patient.   Immunizations    Influenza Covered once per flu season  Please get every year -  No recommendations at this time    Pneumococcal Each vaccine (Mkmwkoe52 & Dxsqozwaa37) covered once after 65 Prevnar 13: 09/05/2017    Lzzrtztgs82: 03/03/2009     No recommendations at this time    Hepatitis B One screening covered for patients with certain risk factors   -  No recommendations at this time    Tetanus Toxoid Not covered by Medicare Part B unless medically  necessary (cut with metal); may be covered with your pharmacy prescription benefits -    Tetanus, Diptheria and Pertusis TD and TDaP Not covered by Medicare Part B -  No recommendations at this time    Zoster Not covered by Medicare Part B; may be covered with your pharmacy  prescription benefits -  Zoster Vaccines(1 of 2) Never done     Diabetes      Hemoglobin A1C Annually; if last result is elevated, may be asked to retest more frequently.    Medicare covers every 3 months Lab Results   Component Value Date     (H) 04/01/2024    A1C 9.7 (H) 04/01/2024       No recommendations at this time    Creat/alb ratio Annually Lab Results   Component Value Date    MICROALBCREA 103.0 (H) 07/12/2018       LDL Annually Lab Results   Component Value Date    LDL 57 04/01/2024       Dilated Eye Exam Annually [unfilled]     Annual Monitoring of Persistent Medications (ACE/ARB, digoxin diuretics, anticonvulsants)    Potassium Annually Lab Results   Component Value Date    K 3.2 (L) 04/01/2024         Creatinine   Annually Lab Results   Component Value Date    CREATSERUM 1.71 (H) 04/01/2024         BUN Annually Lab Results   Component Value Date    BUN 33 (H) 04/01/2024       Drug Serum Conc Annually No results found for: \"DIGOXIN\", \"DIG\", \"VALP\"

## 2024-05-28 NOTE — TELEPHONE ENCOUNTER
We can place an order but I do not think that medicare covers this- everything I have read is that Bedside tables are not classified as a medical necessity and are not covered. If the family finds a facility that will provide this we can fax an order. ANO x 3, RRR no M/R/G, PRECIOUS SCHAFER AAOx4  Heart, lungs wnl

## 2024-05-29 ENCOUNTER — TELEPHONE (OUTPATIENT)
Dept: FAMILY MEDICINE CLINIC | Facility: CLINIC | Age: 79
End: 2024-05-29

## 2024-05-29 DIAGNOSIS — R79.89 ELEVATED TSH: Primary | ICD-10-CM

## 2024-05-29 RX ORDER — LEVOTHYROXINE SODIUM 0.1 MG/1
100 TABLET ORAL DAILY
Qty: 30 TABLET | Refills: 1 | Status: SHIPPED | OUTPATIENT
Start: 2024-05-29 | End: 2025-05-24

## 2024-05-29 NOTE — TELEPHONE ENCOUNTER
----- Message from Wendy Mckee sent at 5/29/2024  7:30 AM CDT -----  Results reviewed. TSH remains mildly increased  Can increase Levothyroxine to 100 mcg daily and recheck TSH in 6 weeks. This may help some with his fatigue

## 2024-05-31 ENCOUNTER — NURSE ONLY (OUTPATIENT)
Dept: FAMILY MEDICINE CLINIC | Facility: CLINIC | Age: 79
End: 2024-05-31

## 2024-05-31 ENCOUNTER — TELEPHONE (OUTPATIENT)
Dept: FAMILY MEDICINE CLINIC | Facility: CLINIC | Age: 79
End: 2024-05-31

## 2024-05-31 DIAGNOSIS — E11.22 DIABETES MELLITUS WITH STAGE 3 CHRONIC KIDNEY DISEASE (HCC): Primary | ICD-10-CM

## 2024-05-31 DIAGNOSIS — N18.30 DIABETES MELLITUS WITH STAGE 3 CHRONIC KIDNEY DISEASE (HCC): Primary | ICD-10-CM

## 2024-05-31 LAB
CREAT UR-SCNC: 65.5 MG/DL
MICROALBUMIN UR-MCNC: 6.71 MG/DL
MICROALBUMIN/CREAT 24H UR-RTO: 102.4 UG/MG (ref ?–30)

## 2024-05-31 PROCEDURE — 82570 ASSAY OF URINE CREATININE: CPT | Performed by: NURSE PRACTITIONER

## 2024-05-31 PROCEDURE — 82043 UR ALBUMIN QUANTITATIVE: CPT | Performed by: NURSE PRACTITIONER

## 2024-05-31 NOTE — TELEPHONE ENCOUNTER
Puja from [facility] called and was wondering the status of an addendum that needed to be made to patient's progress notes. Per Puja, there needed to be an addendum to state that the patient is still using Dex com sensors. Call back number is [phone] and fax number is [phone]. Please advise

## 2024-06-03 ENCOUNTER — TELEPHONE (OUTPATIENT)
Dept: FAMILY MEDICINE CLINIC | Facility: CLINIC | Age: 79
End: 2024-06-03

## 2024-06-03 NOTE — TELEPHONE ENCOUNTER
Just Peggy GARCIA @ [facility], said she spoke with patient to set up Home health appointments,  states patient declined all Home Health Services.  Peggy tried to reach out to patient's granddaughter, was not able to connect with her, but she left a message.  She could not remember if she contacted patients daughter.     Ph. [phone]

## 2024-06-03 NOTE — TELEPHONE ENCOUNTER
GABE Woods    If they could please call patient's daughter Genoveva ([phone])  She requesting home health and is needing help with patient

## 2024-06-20 RX ORDER — LEVOTHYROXINE SODIUM 88 UG/1
88 TABLET ORAL
Qty: 90 TABLET | Refills: 0 | OUTPATIENT
Start: 2024-06-20

## 2024-06-24 ENCOUNTER — TELEPHONE (OUTPATIENT)
Dept: FAMILY MEDICINE CLINIC | Facility: CLINIC | Age: 79
End: 2024-06-24

## 2024-06-24 NOTE — TELEPHONE ENCOUNTER
Due for repeat TSH  MyChart message sent  Future Appointments   Date Time Provider Department Center   7/1/2024  2:40 PM Mihaela Vidal MD [facility] [address]

## 2024-07-01 DIAGNOSIS — F43.21 SITUATIONAL DEPRESSION: ICD-10-CM

## 2024-07-02 RX ORDER — SERTRALINE HYDROCHLORIDE 25 MG/1
25 TABLET, FILM COATED ORAL DAILY
Qty: 90 TABLET | Refills: 3 | OUTPATIENT
Start: 2024-07-02

## 2024-07-08 ENCOUNTER — TELEPHONE (OUTPATIENT)
Dept: FAMILY MEDICINE CLINIC | Facility: CLINIC | Age: 79
End: 2024-07-08

## 2024-07-08 NOTE — TELEPHONE ENCOUNTER
Received fax again from pharmacy for refill of Levothyroxine.    Patient needs to have labs done on July 10 to evaluate new dosage that was started on 5/29/24.    New prescription was dispensed on 5/29/24 for quantity #30 with 1 refill    HeadCount message sent to patient to remind of refill and labs.

## 2024-07-09 ENCOUNTER — TELEPHONE (OUTPATIENT)
Dept: FAMILY MEDICINE CLINIC | Facility: CLINIC | Age: 79
End: 2024-07-09

## 2024-07-09 NOTE — TELEPHONE ENCOUNTER
Last office visit notes faxed to [facility]3]

## 2024-07-09 NOTE — TELEPHONE ENCOUNTER
Completed form copied for daughter.  Placed with front staff.    Daughter notified that form was ready for

## 2024-07-09 NOTE — TELEPHONE ENCOUNTER
Received a call from [facility],  patient daughter is there is trying to get diabetic shoes,  she gave them the form that dr newsome filled out, but they need last office notes regarding his diabetic foot exam.       Fax # [phone]  Ph. [phone]

## 2024-07-09 NOTE — TELEPHONE ENCOUNTER
Patient daughter called said that she would like to  the form we faxed to [facility] to take it over because they are still saying they didn't get it. Per daughter she is in the area today.

## 2024-07-10 ENCOUNTER — TELEPHONE (OUTPATIENT)
Dept: FAMILY MEDICINE CLINIC | Facility: CLINIC | Age: 79
End: 2024-07-10

## 2024-07-10 RX ORDER — LEVOTHYROXINE SODIUM 88 UG/1
88 TABLET ORAL
Qty: 90 TABLET | Refills: 3 | OUTPATIENT
Start: 2024-07-10

## 2024-07-10 NOTE — TELEPHONE ENCOUNTER
The original prescription was discontinued on 5/29/2024 by Jessica Sharpe RN for the following reason: Dose adjustment. Renewing this prescription may not be appropriate.

## 2024-07-10 NOTE — TELEPHONE ENCOUNTER
Spoke with daughter - she had gone to  Clinic yesterday and took the completed form in for diabetic shoes.  Still pending review.    Also reminded daughter that patient is due for labs - before he can get his thyroid medication refill.  Daughter verbalized understanding.  Will bring in to reference lab in [place].  Reminded of hours 7:30-4 and being closed for lunch from 12-1

## 2024-07-22 ENCOUNTER — LAB ENCOUNTER (OUTPATIENT)
Dept: LAB | Age: 79
End: 2024-07-22
Attending: NURSE PRACTITIONER
Payer: MEDICARE

## 2024-07-22 DIAGNOSIS — R79.89 ELEVATED TSH: ICD-10-CM

## 2024-07-22 PROCEDURE — 36415 COLL VENOUS BLD VENIPUNCTURE: CPT

## 2024-07-22 PROCEDURE — 84443 ASSAY THYROID STIM HORMONE: CPT

## 2024-07-23 LAB — TSI SER-ACNC: 2.86 MIU/ML (ref 0.55–4.78)

## 2024-07-24 DIAGNOSIS — N18.30 DIABETES MELLITUS WITH STAGE 3 CHRONIC KIDNEY DISEASE (HCC): ICD-10-CM

## 2024-07-24 DIAGNOSIS — E78.2 MIXED HYPERLIPIDEMIA: ICD-10-CM

## 2024-07-24 DIAGNOSIS — E11.59 HYPERTENSION ASSOCIATED WITH DIABETES (HCC): Primary | ICD-10-CM

## 2024-07-24 DIAGNOSIS — N18.32 STAGE 3B CHRONIC KIDNEY DISEASE (HCC): ICD-10-CM

## 2024-07-24 DIAGNOSIS — E11.22 DIABETES MELLITUS WITH STAGE 3 CHRONIC KIDNEY DISEASE (HCC): ICD-10-CM

## 2024-07-24 DIAGNOSIS — I15.2 HYPERTENSION ASSOCIATED WITH DIABETES (HCC): Primary | ICD-10-CM

## 2024-07-24 RX ORDER — LEVOTHYROXINE SODIUM 0.1 MG/1
100 TABLET ORAL DAILY
Qty: 90 TABLET | Refills: 0 | Status: SHIPPED | OUTPATIENT
Start: 2024-07-24 | End: 2024-10-22

## 2024-07-26 RX ORDER — ATORVASTATIN CALCIUM 20 MG/1
20 TABLET, FILM COATED ORAL NIGHTLY
Qty: 90 TABLET | Refills: 3 | Status: SHIPPED | OUTPATIENT
Start: 2024-07-26

## 2024-08-02 RX ORDER — TAMSULOSIN HYDROCHLORIDE 0.4 MG/1
0.4 CAPSULE ORAL DAILY
Qty: 90 CAPSULE | Refills: 3 | Status: SHIPPED | OUTPATIENT
Start: 2024-08-02

## 2024-08-06 ENCOUNTER — TELEPHONE (OUTPATIENT)
Dept: FAMILY MEDICINE CLINIC | Facility: CLINIC | Age: 79
End: 2024-08-06

## 2024-08-06 NOTE — TELEPHONE ENCOUNTER
Ambreen with [facility] called said that some orders they have received weren't signed. Per ambreen they were from march of last year. Ambreen is going to fax orders back.

## 2024-09-16 ENCOUNTER — TELEPHONE (OUTPATIENT)
Dept: FAMILY MEDICINE CLINIC | Facility: CLINIC | Age: 79
End: 2024-09-16

## 2024-09-16 NOTE — TELEPHONE ENCOUNTER
Spoke with daughter - informed that dad needs to go to the ER for evaluation.  Daughter states patient will refuse to go. Will not be evaluated.  Daughter only wants a urine sample done.    Discussed with nurse practitioner - patient needs to be evaluated.    Contacted daughter again - explained about the need for evaluation.  Daughter again stated father will refuse to go to ER.

## 2024-09-16 NOTE — TELEPHONE ENCOUNTER
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     Genoveva would like to know if provider will put in an order for UA, and she could drop off a urine sample    She also has a question,  Patient has had really bad UTI's in the past, but patient does not have burning sensation when he urinates,  she was told this was due to being a diabetic and that they do not have the same sx for UTI as most people. She would like to know if this is accurate.  Please advise

## 2024-09-24 ENCOUNTER — TELEPHONE (OUTPATIENT)
Dept: FAMILY MEDICINE CLINIC | Facility: CLINIC | Age: 79
End: 2024-09-24

## 2024-09-24 NOTE — TELEPHONE ENCOUNTER
Genoveva Called regarding forms they received for a continues glucose monitor. Said they got a form with corrections but no initials or dates on them. Said that faxed them back

## 2024-09-27 ENCOUNTER — TELEPHONE (OUTPATIENT)
Dept: FAMILY MEDICINE CLINIC | Facility: CLINIC | Age: 79
End: 2024-09-27

## 2024-09-27 NOTE — TELEPHONE ENCOUNTER
Tori is calling regarding prior authorization for continuous glucose monitor.  She said they received the fax but the form needs to be signed, initialed  & it to be dated.  They did fax the form back to the office today to be corrected.   Fax:  [phone]

## 2024-10-07 RX ORDER — LEVOTHYROXINE SODIUM 100 UG/1
100 TABLET ORAL DAILY
Qty: 90 TABLET | Refills: 0 | Status: SHIPPED | OUTPATIENT
Start: 2024-10-07

## 2024-10-09 ENCOUNTER — TELEPHONE (OUTPATIENT)
Dept: FAMILY MEDICINE CLINIC | Facility: CLINIC | Age: 79
End: 2024-10-09

## 2024-10-09 NOTE — TELEPHONE ENCOUNTER
Had received faxes from [facility] pharmacy that patient needed refills on insulins.    Contacted daughter Genoveva, who said to contact Tori to discuss.    Ok per verbal release to speak with Tori  Patient does not need any refill of insulin at this time.    Has been waiting for paperwork to be faxed for CGMS    Forms had been completed/signed and dated.  Had been faxed on 9/27/24  Refaxed completed forms today to [phone]    Completed forms already scanned in

## 2024-10-12 NOTE — TELEPHONE ENCOUNTER
mirtazapine 7.5 mg [id number]/31/2024   7.5 MG90 Liliya Ling MD    Last office visit 5/28/24  Last refilled on 9/13/23 for # 90 with 0 refills  No future appointments.     Thank you.

## 2024-10-14 RX ORDER — MIRTAZAPINE 7.5 MG/1
7.5 TABLET, FILM COATED ORAL NIGHTLY
Qty: 90 TABLET | Refills: 0 | Status: SHIPPED | OUTPATIENT
Start: 2024-10-14

## 2024-10-24 ENCOUNTER — TELEPHONE (OUTPATIENT)
Dept: FAMILY MEDICINE CLINIC | Facility: CLINIC | Age: 79
End: 2024-10-24

## 2024-11-01 ENCOUNTER — TELEPHONE (OUTPATIENT)
Dept: NEUROLOGY | Facility: CLINIC | Age: 79
End: 2024-11-01

## 2024-11-01 NOTE — TELEPHONE ENCOUNTER
Received fax from [employer].  Botox has been re-authorized for 2025.    [facility]#[phone] from 01/01/25-12/31/25.    This is buy and bill.

## 2024-12-09 ENCOUNTER — LAB ENCOUNTER (OUTPATIENT)
Dept: LAB | Age: 79
End: 2024-12-09
Attending: NURSE PRACTITIONER
Payer: MEDICARE

## 2024-12-09 DIAGNOSIS — E11.22 DIABETES MELLITUS WITH STAGE 3 CHRONIC KIDNEY DISEASE (HCC): ICD-10-CM

## 2024-12-09 DIAGNOSIS — E78.2 MIXED HYPERLIPIDEMIA: ICD-10-CM

## 2024-12-09 DIAGNOSIS — N18.32 STAGE 3B CHRONIC KIDNEY DISEASE (HCC): ICD-10-CM

## 2024-12-09 DIAGNOSIS — N18.30 DIABETES MELLITUS WITH STAGE 3 CHRONIC KIDNEY DISEASE (HCC): ICD-10-CM

## 2024-12-09 DIAGNOSIS — I15.2 HYPERTENSION ASSOCIATED WITH DIABETES (HCC): ICD-10-CM

## 2024-12-09 DIAGNOSIS — E11.59 HYPERTENSION ASSOCIATED WITH DIABETES (HCC): ICD-10-CM

## 2024-12-09 LAB
ALBUMIN SERPL-MCNC: 3.8 G/DL (ref 3.2–4.8)
ALBUMIN/GLOB SERPL: 1 {RATIO} (ref 1–2)
ALP LIVER SERPL-CCNC: 118 U/L
ALT SERPL-CCNC: 10 U/L
ANION GAP SERPL CALC-SCNC: 10 MMOL/L (ref 0–18)
AST SERPL-CCNC: 13 U/L (ref ?–34)
BILIRUB SERPL-MCNC: 0.9 MG/DL (ref 0.2–1.1)
BUN BLD-MCNC: 31 MG/DL (ref 9–23)
CALCIUM BLD-MCNC: 9.8 MG/DL (ref 8.7–10.4)
CHLORIDE SERPL-SCNC: 107 MMOL/L (ref 98–112)
CHOLEST SERPL-MCNC: 151 MG/DL (ref ?–200)
CO2 SERPL-SCNC: 22 MMOL/L (ref 21–32)
CREAT BLD-MCNC: 1.54 MG/DL
EGFRCR SERPLBLD CKD-EPI 2021: 45 ML/MIN/1.73M2 (ref 60–?)
EST. AVERAGE GLUCOSE BLD GHB EST-MCNC: 206 MG/DL (ref 68–126)
FASTING PATIENT LIPID ANSWER: YES
FASTING STATUS PATIENT QL REPORTED: YES
GLOBULIN PLAS-MCNC: 3.9 G/DL (ref 2–3.5)
GLUCOSE BLD-MCNC: 256 MG/DL (ref 70–99)
HBA1C MFR BLD: 8.8 % (ref ?–5.7)
HDLC SERPL-MCNC: 51 MG/DL (ref 40–59)
LDLC SERPL CALC-MCNC: 89 MG/DL (ref ?–100)
NONHDLC SERPL-MCNC: 100 MG/DL (ref ?–130)
OSMOLALITY SERPL CALC.SUM OF ELEC: 303 MOSM/KG (ref 275–295)
POTASSIUM SERPL-SCNC: 3.7 MMOL/L (ref 3.5–5.1)
PROT SERPL-MCNC: 7.7 G/DL (ref 5.7–8.2)
SODIUM SERPL-SCNC: 139 MMOL/L (ref 136–145)
T4 FREE SERPL-MCNC: 1.4 NG/DL (ref 0.8–1.7)
TRIGL SERPL-MCNC: 53 MG/DL (ref 30–149)
TSI SER-ACNC: 5.14 UIU/ML (ref 0.55–4.78)
VLDLC SERPL CALC-MCNC: 8 MG/DL (ref 0–30)

## 2024-12-09 PROCEDURE — 80053 COMPREHEN METABOLIC PANEL: CPT

## 2024-12-09 PROCEDURE — 84439 ASSAY OF FREE THYROXINE: CPT

## 2024-12-09 PROCEDURE — 84443 ASSAY THYROID STIM HORMONE: CPT

## 2024-12-09 PROCEDURE — 36415 COLL VENOUS BLD VENIPUNCTURE: CPT

## 2024-12-09 PROCEDURE — 80061 LIPID PANEL: CPT

## 2024-12-09 PROCEDURE — 83036 HEMOGLOBIN GLYCOSYLATED A1C: CPT

## 2024-12-11 ENCOUNTER — TELEPHONE (OUTPATIENT)
Dept: FAMILY MEDICINE CLINIC | Facility: CLINIC | Age: 79
End: 2024-12-11

## 2024-12-11 DIAGNOSIS — R79.89 ELEVATED TSH: Primary | ICD-10-CM

## 2024-12-11 RX ORDER — LEVOTHYROXINE SODIUM 112 UG/1
112 TABLET ORAL
Qty: 90 TABLET | Refills: 0 | Status: SHIPPED | OUTPATIENT
Start: 2024-12-11 | End: 2025-03-11

## 2024-12-11 RX ORDER — LEVOTHYROXINE SODIUM 125 UG/1
125 TABLET ORAL
Qty: 90 TABLET | Refills: 0 | Status: SHIPPED | OUTPATIENT
Start: 2024-12-11 | End: 2024-12-11

## 2024-12-11 NOTE — TELEPHONE ENCOUNTER
Daughter Genoveva advised. Verbalized understanding. OK per HIPAA form. Patient has been taking levothyroxine as prescribed. Gave info for   Dr [facility]. Her phone number is [phone] but she is unsure he will go.

## 2024-12-11 NOTE — TELEPHONE ENCOUNTER
Discharge Summary/Instructions after an Endoscopic Procedure  Patient Name: Adair Leigh  Patient MRN: [id number]  Patient YOB: 1960  Thursday, May 24, 2018  Ricky Crenshaw MD  RESTRICTIONS:  During your procedure today, you received medications for sedation.  These   medications may affect your judgment, balance and coordination.  Therefore,   for 24 hours, you have the following restrictions:   - DO NOT drive a car, operate machinery, make legal/financial decisions,   sign important papers or drink alcohol.    ACTIVITY:  The following day: return to full activity including work, except no heavy   lifting, straining or running for 3 days if polyps were removed.  DIET:  Eat and drink normally unless instructed otherwise.     TREATMENT FOR COMMON SIDE EFFECTS:  - Mild abdominal pain, nausea, belching, bloating or excessive gas:  rest,   eat lightly and use a heating pad.  - Sore Throat: treat with throat lozenges and/or gargle with warm salt   water.  - Because air was used during the procedure, expelling large amounts of air   from your rectum or belching is normal.  - If a bowel prep was taken, you may not have a bowel movement for 1-3 days.    This is normal.  SYMPTOMS TO WATCH FOR AND REPORT TO YOUR PHYSICIAN:  1. Abdominal pain or bloating, other than gas cramps.  2. Chest pain.  3. Back pain.  4. Signs of infection such as: chills or fever occurring within 24 hours   after the procedure.  5. Rectal bleeding, which would show as bright red, maroon, or black stools.   (A tablespoon of blood from the rectum is not serious, especially if   hemorrhoids are present.)  6. Vomiting.  7. Weakness or dizziness.  GO DIRECTLY TO THE NEAREST EMERGENCY ROOM IF YOU HAVE ANY OF THE FOLLOWING:      Difficulty breathing              Chills and/or fever over 101 F   Persistent vomiting and/or vomiting blood   Severe abdominal pain   Severe chest pain   Black, tarry stools   Bleeding- more than one  Genoveva advised. Verbalized understanding. Reminder placed for 6 weeks   tablespoon   Any other symptom or condition that you feel may need urgent attention  Your doctor recommends these additional instructions:  If any biopsies were taken, your doctors clinic will contact you in 1 to 2   weeks with any results.  - Perform a colonoscopy as previously scheduled.   - Discharge patient to home.   - Await pathology results.   - Follow an antireflux regimen.   - Use Protonix (pantoprazole) 40 mg PO daily.   - Repeat upper endoscopy PRN for retreatment.  For questions, problems or results please call your physician - Ricky Crenshaw MD at Work:  [phone].  EMERGENCY PHONE NUMBER: [phone], LAB RESULTS: [phone]  IF A COMPLICATION OR EMERGENCY SITUATION ARISES AND YOU ARE UNABLE TO REACH   YOUR PHYSICIAN - GO DIRECTLY TO THE EMERGENCY ROOM.  ___________________________________________  Nurse Signature  ___________________________________________  Patient/Designated Responsible Party Signature  Ricky Crenshaw MD  5/24/2018 12:52:11 PM  This report has been verified and signed electronically.  [employer]

## 2024-12-11 NOTE — TELEPHONE ENCOUNTER
----- Message from Wendy Mckee sent at 12/11/2024  7:49 AM CST -----  Results reviewed.   TSH elevated, has he been taking levothyroxine?   Hyperglycemia, kidney dysfunction  A1C improved but poorly controlled. Has previously refused seeing endo, but that would again be recommended  Cholesterol at goal

## 2024-12-11 NOTE — TELEPHONE ENCOUNTER
Increase levothyroxine to 112 mcg, recheck TSH in 6 weeks  Accidentally sent 125 mcg initially. Should be 112 mcg daily

## 2024-12-20 RX ORDER — METOPROLOL SUCCINATE 25 MG/1
25 TABLET, EXTENDED RELEASE ORAL DAILY
Qty: 90 TABLET | Refills: 1 | Status: SHIPPED | OUTPATIENT
Start: 2024-12-20

## 2024-12-20 NOTE — TELEPHONE ENCOUNTER
LOV: 5/28/24 for annual heal exam    METOPROLOL SUCCINATE ER 25 MG Oral Tablet 24 Hr  TAKE 1 TABLET EVERY DAY Dispense: 90 tablet, Refills: 10 ordered        11/17/2023     No future appointments.

## 2024-12-26 NOTE — TELEPHONE ENCOUNTER
Last office visit 5/28/24  Last refilled on 10/14/24 for # 90 with 0 refills  No future appointments.     Thank you.

## 2024-12-28 RX ORDER — MIRTAZAPINE 7.5 MG/1
7.5 TABLET, FILM COATED ORAL NIGHTLY
Qty: 90 TABLET | Refills: 0 | Status: SHIPPED | OUTPATIENT
Start: 2024-12-28

## 2025-01-22 ENCOUNTER — TELEPHONE (OUTPATIENT)
Dept: FAMILY MEDICINE CLINIC | Facility: CLINIC | Age: 80
End: 2025-01-22

## 2025-01-25 NOTE — TELEPHONE ENCOUNTER
Last office visit 5/28/24  Last refilled on 4/8/24 for # 90 with 3 refills  No future appointments.     Thank you.

## 2025-01-27 RX ORDER — PANTOPRAZOLE SODIUM 40 MG/1
40 TABLET, DELAYED RELEASE ORAL
Qty: 90 TABLET | Refills: 0 | Status: SHIPPED | OUTPATIENT
Start: 2025-01-27

## 2025-02-06 ENCOUNTER — TELEPHONE (OUTPATIENT)
Dept: FAMILY MEDICINE CLINIC | Facility: CLINIC | Age: 80
End: 2025-02-06

## 2025-02-06 NOTE — TELEPHONE ENCOUNTER
Received paperwork from patient's daughter requesting intermittent FMLA  Last office visit 5/28/24  Please advise

## 2025-02-07 ENCOUNTER — TELEPHONE (OUTPATIENT)
Dept: FAMILY MEDICINE CLINIC | Facility: CLINIC | Age: 80
End: 2025-02-07

## 2025-02-07 NOTE — TELEPHONE ENCOUNTER
Received FMLA forms from pts daughter Genoveva Amezcua via fax  Did not complete MARISA  Forwarding to forms Dept

## 2025-02-10 NOTE — TELEPHONE ENCOUNTER
Patient's daughter April calling to check on the status of Family Medical Leave Act forms. Unable to locate any forms in the box and in the originals. Advised patient to either call the office to see if the office may have them still or to call employer to either fax/email to us. Advised patient to still call back to confirm that forms were received. Patient understood and had no further questions.

## 2025-02-11 ENCOUNTER — TELEPHONE (OUTPATIENT)
Dept: OBGYN CLINIC | Facility: CLINIC | Age: 80
End: 2025-02-11

## 2025-02-11 NOTE — TELEPHONE ENCOUNTER
Family Medical Leave Act forms received via email from office, no authorization attached, also missing pages, will check originals for remaining pages, logged for processing.

## 2025-02-12 NOTE — TELEPHONE ENCOUNTER
Dr. 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     Thanks,   Adwoa VALDES

## 2025-02-12 NOTE — TELEPHONE ENCOUNTER
Cld patient spk w/ patient and Daughter (April) per patient daughter gathered the below;    Patient daughter to resubmit Family Medical Leave Act forms from [employer] and send w/ my attention     Type of Leave:Intermittent Family Medical Leave Act   Reason for Leave: Help w/ patient patient is Partially Paralyzed due to stroke   Start date of leave: 01/28/2025  End date of leave:07/28/2025  How many flare ups per month/length?: 1-4 flare up a month lasting 1-2 days in duration   How many appts per month/length?: 1-4 appointments a month lasting 1-4 hrs   Was Fee and Turnaround info Given?:

## 2025-02-17 NOTE — TELEPHONE ENCOUNTER
Last office visit 5/28/24  Last refilled on 4/28/24 for # 90 with 3 refills  No future appointments.     Thank you.

## 2025-02-19 RX ORDER — RIVAROXABAN 20 MG/1
20 TABLET, FILM COATED ORAL
Qty: 90 TABLET | Refills: 3 | Status: SHIPPED | OUTPATIENT
Start: 2025-02-19

## 2025-02-19 NOTE — TELEPHONE ENCOUNTER
Cld patient's daughter, while we did receive the 2nd email from patient it is however the same three pages, still missing signature page and page that contains #1 of the questionnaire for Family Medical Leave Act, forms submitted start w/ # 2

## 2025-02-21 ENCOUNTER — TELEPHONE (OUTPATIENT)
Dept: FAMILY MEDICINE CLINIC | Facility: CLINIC | Age: 80
End: 2025-02-21

## 2025-02-24 RX ORDER — LEVOTHYROXINE SODIUM 125 UG/1
125 TABLET ORAL
Qty: 90 TABLET | Refills: 0 | OUTPATIENT
Start: 2025-02-24

## 2025-02-24 RX ORDER — FINASTERIDE 5 MG/1
5 TABLET, FILM COATED ORAL DAILY
Qty: 90 TABLET | Refills: 0 | Status: SHIPPED | OUTPATIENT
Start: 2025-02-24

## 2025-02-24 NOTE — TELEPHONE ENCOUNTER
Patient sister April called back, she was advised we're still missing pages, per patient she will call [employer] and ask that forms be sent directly to us here in the Forms department, patient's sister was provided w/ Fax & email to have sent in.

## 2025-02-24 NOTE — TELEPHONE ENCOUNTER
Thyroid Medication Protocol [id number]/23/2025 12:53 PM   Protocol Details Last TSH value is normal    Medication is active on med list    TSH in past 12 months    In person appointment or virtual visit       Due for repeat thyroid labs  [email or web address] message sent  No future appointments.

## 2025-02-24 NOTE — TELEPHONE ENCOUNTER
Patient daughter calling back stating that she emailed the missing pages. Could not locate the email that patient sent. Patient sent it again and still has not come through. She is going to see if her work can email them to us directly and check back.

## 2025-02-26 RX ORDER — LEVOTHYROXINE SODIUM 112 UG/1
112 TABLET ORAL
Qty: 90 TABLET | Refills: 0 | OUTPATIENT
Start: 2025-02-26 | End: 2025-05-27

## 2025-02-26 NOTE — TELEPHONE ENCOUNTER
Patient's daughter Genoveva calling to check to see if forms were received, located and logged the forms for processing. Details were already obtained. Patient's daughter requesting Release of Information form to be sent in a paper format for patient to be able to complete.  notified.     Type of Leave:Intermittent Family Medical Leave Act   Reason for Leave: Help w/ patient patient is Partially Paralyzed due to stroke   Start date of leave: 01/28/2025  End date of leave:07/28/2025  How many flare ups per month/length?: 1-4 flare up a month lasting 1-2 days in duration   How many appts per month/length?: 1-4 appointments a month lasting 1-4 hrs   Was Fee and Turnaround info Given?:

## 2025-02-27 NOTE — TELEPHONE ENCOUNTER
Dr. Hernandez,       Please sign off on form if you agree to: Family Medical Leave Act DTR Intermittent 01/28/2025-07/28/2025   (place your signature on the first page only)    -From your Inbasket, Highlight the patient and click Chart   -Double click the 02/11/2025 Forms Completion telephone encounter  -Scroll down to the Media section   -Click the blue Hyperlink: Family Medical Leave Act Dtr Dr. Dennise Hernandez 02/27/2025  -Click Acknowledge located in the top right ribbon/menu   -Drag the mouse into the blank space of the document and a + sign will appear. Left click to   electronically sign the document.     Thank you,  Adwoa VALDES

## 2025-02-28 NOTE — TELEPHONE ENCOUNTER
Dr. Hernandez/ Wendy Mckee      Please sign off on form if you agree to: Family Medical Leave Act Intermittent DTR 01/28/2025-07/28/2025  (place your signature on the first page only)    -From your Inbasket, Highlight the patient and click Chart   -Double click the 02/11/2025 Forms Completion telephone encounter  -Scroll down to the Media section   -Click the blue Hyperlink: Family Medical Leave Act Dr. Dennise Hernandez/ Wendy Mckee 02/28/2025  -Click Acknowledge located in the top right ribbon/menu   -Drag the mouse into the blank space of the document and a + sign will appear. Left click to   electronically sign the document.     Thank you,  Adwoa VALDES

## 2025-03-05 NOTE — TELEPHONE ENCOUNTER
April calling back to check the status on forms, advised April form completed and faxed to [employer], [phone] awaiting confirmation. Confirmation received. Sent [employer] message to patient.

## 2025-03-07 NOTE — TELEPHONE ENCOUNTER
Patient's daughter called, Bibiana states forms were not signed.  Electronic signature located in upper right corner.  Informed I would resend form with duplicate signature placed on signature line and inform her when confirmation received.  Dtr acknowledged.

## 2025-03-10 ENCOUNTER — OFFICE VISIT (OUTPATIENT)
Dept: FAMILY MEDICINE CLINIC | Facility: CLINIC | Age: 80
End: 2025-03-10
Payer: MEDICARE

## 2025-03-10 VITALS
HEART RATE: 88 BPM | SYSTOLIC BLOOD PRESSURE: 142 MMHG | TEMPERATURE: 97 F | OXYGEN SATURATION: 97 % | DIASTOLIC BLOOD PRESSURE: 82 MMHG | RESPIRATION RATE: 20 BRPM

## 2025-03-10 DIAGNOSIS — R60.0 EDEMA OF EXTREMITIES: ICD-10-CM

## 2025-03-10 DIAGNOSIS — E78.5 HYPERLIPIDEMIA ASSOCIATED WITH TYPE 2 DIABETES MELLITUS (HCC): ICD-10-CM

## 2025-03-10 DIAGNOSIS — E66.01 SEVERE OBESITY (BMI 35.0-39.9) WITH COMORBIDITY (HCC): ICD-10-CM

## 2025-03-10 DIAGNOSIS — N18.32 STAGE 3B CHRONIC KIDNEY DISEASE (HCC): ICD-10-CM

## 2025-03-10 DIAGNOSIS — I10 ESSENTIAL HYPERTENSION WITH GOAL BLOOD PRESSURE LESS THAN 140/90: ICD-10-CM

## 2025-03-10 DIAGNOSIS — Z79.01 LONG TERM (CURRENT) USE OF ANTICOAGULANTS: ICD-10-CM

## 2025-03-10 DIAGNOSIS — R33.9 URINARY RETENTION: ICD-10-CM

## 2025-03-10 DIAGNOSIS — I65.23 ASYMPTOMATIC BILATERAL CAROTID ARTERY STENOSIS: ICD-10-CM

## 2025-03-10 DIAGNOSIS — G81.91 RIGHT HEMIPLEGIA (HCC): ICD-10-CM

## 2025-03-10 DIAGNOSIS — E11.69 HYPERLIPIDEMIA ASSOCIATED WITH TYPE 2 DIABETES MELLITUS (HCC): ICD-10-CM

## 2025-03-10 DIAGNOSIS — I15.2 HYPERTENSION ASSOCIATED WITH DIABETES (HCC): Chronic | ICD-10-CM

## 2025-03-10 DIAGNOSIS — F33.0 MILD EPISODE OF RECURRENT MAJOR DEPRESSIVE DISORDER: ICD-10-CM

## 2025-03-10 DIAGNOSIS — N18.30 DIABETES MELLITUS WITH STAGE 3 CHRONIC KIDNEY DISEASE (HCC): ICD-10-CM

## 2025-03-10 DIAGNOSIS — I48.19 PERSISTENT ATRIAL FIBRILLATION (HCC): ICD-10-CM

## 2025-03-10 DIAGNOSIS — L98.421 SKIN ULCER OF SACRUM, LIMITED TO BREAKDOWN OF SKIN (HCC): ICD-10-CM

## 2025-03-10 DIAGNOSIS — E11.3313 TYPE 2 DIABETES MELLITUS WITH BOTH EYES AFFECTED BY MODERATE NONPROLIFERATIVE RETINOPATHY AND MACULAR EDEMA, WITH LONG-TERM CURRENT USE OF INSULIN (HCC): ICD-10-CM

## 2025-03-10 DIAGNOSIS — R97.20 ELEVATED PSA: ICD-10-CM

## 2025-03-10 DIAGNOSIS — Z79.4 TYPE 2 DIABETES MELLITUS WITH BOTH EYES AFFECTED BY MODERATE NONPROLIFERATIVE RETINOPATHY AND MACULAR EDEMA, WITH LONG-TERM CURRENT USE OF INSULIN (HCC): ICD-10-CM

## 2025-03-10 DIAGNOSIS — Z00.00 ENCOUNTER FOR ANNUAL HEALTH EXAMINATION: Primary | ICD-10-CM

## 2025-03-10 DIAGNOSIS — E78.2 MIXED HYPERLIPIDEMIA: ICD-10-CM

## 2025-03-10 DIAGNOSIS — E11.22 DIABETES MELLITUS WITH STAGE 3 CHRONIC KIDNEY DISEASE (HCC): ICD-10-CM

## 2025-03-10 DIAGNOSIS — E11.59 HYPERTENSION ASSOCIATED WITH DIABETES (HCC): Chronic | ICD-10-CM

## 2025-03-10 DIAGNOSIS — Z99.3 WHEELCHAIR DEPENDENT: ICD-10-CM

## 2025-03-10 DIAGNOSIS — R15.9 INCONTINENCE OF FECES, UNSPECIFIED FECAL INCONTINENCE TYPE: ICD-10-CM

## 2025-03-10 LAB
ALBUMIN SERPL-MCNC: 4.5 G/DL (ref 3.2–4.8)
ALBUMIN/GLOB SERPL: 1.4 {RATIO} (ref 1–2)
ALP LIVER SERPL-CCNC: 121 U/L
ALT SERPL-CCNC: 8 U/L
ANION GAP SERPL CALC-SCNC: 10 MMOL/L (ref 0–18)
AST SERPL-CCNC: 17 U/L (ref ?–34)
BASOPHILS # BLD AUTO: 0.12 X10(3) UL (ref 0–0.2)
BASOPHILS NFR BLD AUTO: 1.2 %
BILIRUB SERPL-MCNC: 1.1 MG/DL (ref 0.2–1.1)
BUN BLD-MCNC: 41 MG/DL (ref 9–23)
CALCIUM BLD-MCNC: 9.6 MG/DL (ref 8.7–10.6)
CHLORIDE SERPL-SCNC: 103 MMOL/L (ref 98–112)
CHOLEST SERPL-MCNC: 139 MG/DL (ref ?–200)
CO2 SERPL-SCNC: 28 MMOL/L (ref 21–32)
CREAT BLD-MCNC: 1.88 MG/DL
EGFRCR SERPLBLD CKD-EPI 2021: 35 ML/MIN/1.73M2 (ref 60–?)
EOSINOPHIL # BLD AUTO: 0.51 X10(3) UL (ref 0–0.7)
EOSINOPHIL NFR BLD AUTO: 5 %
ERYTHROCYTE [DISTWIDTH] IN BLOOD BY AUTOMATED COUNT: 13.2 %
FASTING PATIENT LIPID ANSWER: NO
FASTING STATUS PATIENT QL REPORTED: NO
GLOBULIN PLAS-MCNC: 3.2 G/DL (ref 2–3.5)
GLUCOSE BLD-MCNC: 183 MG/DL (ref 70–99)
HCT VFR BLD AUTO: 41.1 %
HDLC SERPL-MCNC: 56 MG/DL (ref 40–59)
HGB BLD-MCNC: 13.3 G/DL
IMM GRANULOCYTES # BLD AUTO: 0.05 X10(3) UL (ref 0–1)
IMM GRANULOCYTES NFR BLD: 0.5 %
LDLC SERPL CALC-MCNC: 71 MG/DL (ref ?–100)
LYMPHOCYTES # BLD AUTO: 1.78 X10(3) UL (ref 1–4)
LYMPHOCYTES NFR BLD AUTO: 17.4 %
MCH RBC QN AUTO: 32.2 PG (ref 26–34)
MCHC RBC AUTO-ENTMCNC: 32.4 G/DL (ref 31–37)
MCV RBC AUTO: 99.5 FL
MONOCYTES # BLD AUTO: 0.71 X10(3) UL (ref 0.1–1)
MONOCYTES NFR BLD AUTO: 6.9 %
NEUTROPHILS # BLD AUTO: 7.05 X10 (3) UL (ref 1.5–7.7)
NEUTROPHILS # BLD AUTO: 7.05 X10(3) UL (ref 1.5–7.7)
NEUTROPHILS NFR BLD AUTO: 69 %
NONHDLC SERPL-MCNC: 83 MG/DL (ref ?–130)
OSMOLALITY SERPL CALC.SUM OF ELEC: 307 MOSM/KG (ref 275–295)
PLATELET # BLD AUTO: 272 10(3)UL (ref 150–450)
POTASSIUM SERPL-SCNC: 3.5 MMOL/L (ref 3.5–5.1)
PROT SERPL-MCNC: 7.7 G/DL (ref 5.7–8.2)
RBC # BLD AUTO: 4.13 X10(6)UL
SODIUM SERPL-SCNC: 141 MMOL/L (ref 136–145)
T4 FREE SERPL-MCNC: 1.7 NG/DL (ref 0.8–1.7)
TRIGL SERPL-MCNC: 55 MG/DL (ref 30–149)
TSI SER-ACNC: 3.2 UIU/ML (ref 0.55–4.78)
VLDLC SERPL CALC-MCNC: 8 MG/DL (ref 0–30)
WBC # BLD AUTO: 10.2 X10(3) UL (ref 4–11)

## 2025-03-10 PROCEDURE — 83036 HEMOGLOBIN GLYCOSYLATED A1C: CPT | Performed by: NURSE PRACTITIONER

## 2025-03-10 PROCEDURE — 80061 LIPID PANEL: CPT | Performed by: NURSE PRACTITIONER

## 2025-03-10 PROCEDURE — 84439 ASSAY OF FREE THYROXINE: CPT | Performed by: NURSE PRACTITIONER

## 2025-03-10 PROCEDURE — 80053 COMPREHEN METABOLIC PANEL: CPT | Performed by: NURSE PRACTITIONER

## 2025-03-10 PROCEDURE — 84443 ASSAY THYROID STIM HORMONE: CPT | Performed by: NURSE PRACTITIONER

## 2025-03-10 PROCEDURE — 85025 COMPLETE CBC W/AUTO DIFF WBC: CPT | Performed by: NURSE PRACTITIONER

## 2025-03-10 RX ORDER — SERTRALINE HYDROCHLORIDE 25 MG/1
25 TABLET, FILM COATED ORAL DAILY
COMMUNITY

## 2025-03-10 RX ORDER — SILVER SULFADIAZINE 10 MG/G
1 CREAM TOPICAL DAILY
Qty: 150 UNDEFINED | Refills: 0 | Status: SHIPPED | OUTPATIENT
Start: 2025-03-10 | End: 2025-06-08

## 2025-03-10 NOTE — PROGRESS NOTES
Subjective:   Everett Escobar is a 80 year old male who presents for a MA AHA (Medicare Advantage Annual Health Assessment) and Subsequent Annual Wellness visit (Pt already had Initial Annual Wellness) and scheduled follow up of multiple significant but stable problems.     Patient here with daughter has several concerns    Would like foot exam. Interested in diabetic footwear. Does see podiatrist every 3-4 months (does home visits). Has shoes from  but may need another pair soon.  Needs medication refills but daughter not sure which ones. His other daughter manages the medications.   Having increased rectal pain. Has had elevated PSA in past but did not do testing to see if he does have prostate cancer. He declined testing at that time. Would consider seeing urologist again.  Fecal incontinence. This leads to skin breakdown on his bottom. Daughter has noticed 2 new spots on his sacrum. Patient inquiring about colostomy so he doesn't have to deal with incontinence. Daughter asking for home health to come back out.     Endocrinology: Does not watch diet and does not want to see diabetes specialist  Nephrology: does not want to see specialist anymore  Cardiology: Dr. Jacobs, scheduled for annual visit next month  Neurology: Dr. Vidal, goes once per year  Podiatry: comes to his home every 3-4 months    History/Other:   Fall Risk Assessment:   He has been screened for Falls and is High Risk. Fall Prevention information provided to patient in After Visit Summary.    Do you feel unsteady when standing or walking?: Yes  Do you worry about falling?: Yes  Have you fallen in the past year?: No     Cognitive Assessment:   He had a completely normal cognitive assessment - see flowsheet entries       Functional Ability/Status:   Everett Escobar has some abnormal functions as listed below:  He has Dressing and/or Bathing issues based on screening of functional status.  Difficulty dressing or bathing?: Yes  Bathing or  Showering: Cannot do without help  Dressing: Cannot do without help  He has Toileting difficulties based on screening of functional status.He has Driving difficulties based on screening of functional status. He has difficulties Managing Money/Bills based on screening of functional status.He has difficulties Shopping for Groceries based on screening of functional status. He has difficulties Taking Meds as Rx'd based on screening of functional status. He has Hearing problems based on screening of functional status.He has Vision problems based on screening of functional status. He has Walking problems based on screening of functional status. He has problems with Daily Activities based on screening of functional status. He has problems with Memory based on screening of functional status.       Depression Screening (PHQ):  PHQ-9 TOTAL SCORE: 10  , done 3/10/2025   Thoughts that you would be better off dead, or of hurting yourself in some way: 1    Feeling down, depressed, or hopeless: 3    Trouble falling or staying asleep, or sleeping too much: 1     Feeling tired or having little energy: 1    Poor appetite or overeatin    Feeling bad about yourself - or that you are a failure or have let yourself or your family down: 1    Trouble concentrating on things, such as reading the newspaper or watching television: 1    Moving or speaking so slowly that other people could have noticed. Or the opposite - being so fidgety or restless that you have been moving around a lot more than usual: 1    If you checked off any problems, how difficult have these problems made it for you to do your work, take care of things at home, or get along with other people?: Somewhat difficult    [facility] Suicide Screening on 3/10/2025 was No Risk.     Advanced Directives:   He has a Living Will on file in [facility]; reviewed and discussed documents with patient (and family/surrogate if present).  He does have a POA but we do NOT have it on file  in Epic.      Patient Active Problem List   Diagnosis    Incontinence of feces    Persistent atrial fibrillation (HCC)    Severe obesity (BMI 35.0-39.9) with comorbidity (HCC)    Hypertension associated with diabetes (HCC)    Stage 3b chronic kidney disease (HCC)    Elevated PSA    Hyperlipidemia associated with type 2 diabetes mellitus (HCC)    Right hemiplegia (HCC)    Spasticity    Mixed hyperlipidemia    Essential hypertension with goal blood pressure less than 140/90    Diabetes mellitus with stage 3 chronic kidney disease (HCC)    Skin ulcer of sacrum, limited to breakdown of skin (HCC)    Type 2 diabetes mellitus with both eyes affected by moderate nonproliferative retinopathy and macular edema, with long-term current use of insulin (HCC)    Mild episode of recurrent major depressive disorder    Wheelchair dependent    Asymptomatic bilateral carotid artery stenosis    Edema of extremities    Long term (current) use of anticoagulants    Urinary retention     Allergies:  He is allergic to perfumes.    Current Medications:  Outpatient Medications Marked as Taking for the 3/10/25 encounter (Office Visit) with Wendy Mckee APRN   Medication Sig    sertraline 25 MG Oral Tab Take 1 tablet (25 mg total) by mouth daily.    silver sulfADIAZINE 1 % External Cream Apply 1 Application topically daily.    finasteride 5 MG Oral Tab TAKE 1 TABLET EVERY DAY    XARELTO 20 MG Oral Tab TAKE 1 TABLET EVERY DAY WITH FOOD    pantoprazole 40 MG Oral Tab EC TAKE 1 TABLET EVERY MORNING BEFORE BREAKFAST    metoprolol succinate ER 25 MG Oral Tablet 24 Hr Take 1 tablet (25 mg total) by mouth daily.    levothyroxine 112 MCG Oral Tab Take 1 tablet (112 mcg total) by mouth before breakfast.    TAMSULOSIN 0.4 MG Oral Cap TAKE 1 CAPSULE (0.4 MG TOTAL) BY MOUTH DAILY.    ATORVASTATIN 20 MG Oral Tab TAKE 1 TABLET EVERY NIGHT    potassium chloride 10 MEQ Oral Tab CR     LANTUS SOLOSTAR 100 UNIT/ML Subcutaneous Solution Pen-injector  Inject 20 Units into the skin nightly.    dilTIAZem HCl  MG Oral Tablet 24 Hr Take 120 mg by mouth daily.    Insulin Pen Needle (BD PEN NEEDLE ORIGINAL U/F) 29G X 12.7MM Does not apply Misc USE 4 TIMES DAILY FOR INSULIN DEPENDENT TYPE 2 DIABETES    INSULIN LISPRO, 1 UNIT DIAL, 100 UNIT/ML Subcutaneous Solution Pen-injector INJECT SUBCUTANEOUSLY PER SLIDING SCALE. MAXIMUM DAILY DOSE 75 UNITS DAILY. (DISCARD AND BEGIN A NEW PEN AFTER 28 DAYS)    Continuous Blood Gluc  (DEXCOM G7 ) Does not apply Device 1 each daily.    Continuous Blood Gluc Sensor (DEXCOM G7 SENSOR) Does not apply Misc 1 each every 14 (fourteen) days.    bumetanide 1 MG Oral Tab Take 1 tablet (1 mg total) by mouth 2 (two) times daily.    Aspirin Buf,CaCarb-MgCarb-MgO, 81 MG Oral Tab Take 81 mg by mouth daily.    Cholecalciferol (VITAMIN D-3) 5000 units Oral Tab Take 1 tablet (5,000 Units total) by mouth daily.     Medical History:  He  has a past medical history of Acute renal failure superimposed on chronic kidney disease, unspecified CKD stage, unspecified acute renal failure type (09/06/2018), Acute, but ill-defined, cerebrovascular disease, Adjustment disorder with depressed mood (03/25/2011), CAD (coronary artery disease) (04/07/2008), Calculus of gallbladder without mention of cholecystitis or obstruction (04/07/2008), Cholelithiasis, Depression, Depressive disorder, not elsewhere classified (06/13/2011), Diarrhea (02/16/2012), Dizziness and giddiness (01/16/2012), Dysuria (01/21/2012), Essential hypertension, benign (02/07/2008), Fecal incontinence (02/16/2012), General weakness (09/06/2018), Heart disease, Hernia, High blood pressure, High cholesterol, Insomnia, unspecified (01/16/2012), Intentional overdose, initial encounter (HCC) (07/20/2023), Ischiorectal abscess, Kidney disease, Knee pain, left, Obesity, unspecified (02/07/2008), Orthostatic hypotension (01/16/2012), Other and unspecified hyperlipidemia (02/07/2008),  Pain in joint, lower leg (2009), PONV (postoperative nausea and vomiting), Proteinuria (2008), Stroke (HCC), Supratherapeutic INR (2018), Type II or unspecified type diabetes mellitus with renal manifestations, uncontrolled(250.42) (2008), Type II or unspecified type diabetes mellitus without mention of complication, not stated as uncontrolled (09/10/2010), Unspecified cerebral artery occlusion with cerebral infarction (2010), and Unspecified essential hypertension.  Surgical History:  He  has a past surgical history that includes hernia surgery; knee replacement surgery; tonsillectomy; other surgical history; cholecystectomy; other surgical history (2014); and other surgical history (N/A, 2015).   Family History:  His family history includes Cancer in his brother and brother; Diabetes in his brother; Heart Disorder in his father and mother.  Social History:  He  reports that he quit smoking about 45 years ago. His smoking use included cigarettes. He has never used smokeless tobacco. He reports current alcohol use of about 3.0 standard drinks of alcohol per week. He reports that he does not use drugs.    Tobacco:  He smoked tobacco in the past but quit greater than 12 months ago.  Social History     Tobacco Use   Smoking Status Former    Current packs/day: 0.00    Types: Cigarettes    Quit date: 1980    Years since quittin.2   Smokeless Tobacco Never   Tobacco Comments    NON-SMOKER          CAGE Alcohol Screen:   CAGE screening score of 0 on 3/10/2025, showing low risk of alcohol abuse.      Patient Care Team:  Dennise Hernandez MD as PCP - General  Mihaela Vidal MD as Consulting Physician (NEUROLOGY)  Alexander Jacobs MD (CARDIOLOGY)    Review of Systems   Constitutional:  Negative for appetite change, chills, fatigue, fever and unexpected weight change.   HENT:  Negative for congestion, ear pain, postnasal drip, rhinorrhea, sore throat and trouble swallowing.     Respiratory:  Negative for cough and shortness of breath.    Cardiovascular:  Positive for leg swelling (chronic RLE, RUE as well). Negative for chest pain and palpitations.   Gastrointestinal:  Positive for constipation (takes Imodium daily and this keeps stools soft). Negative for abdominal pain, blood in stool, diarrhea, nausea and vomiting.   Endocrine: Negative for cold intolerance, heat intolerance, polydipsia, polyphagia and polyuria.   Genitourinary:  Negative for decreased urine volume, dysuria, frequency and hematuria.   Musculoskeletal:  Positive for gait problem.   Skin:  Negative for rash.   Neurological:  Negative for headaches.   Psychiatric/Behavioral:  Negative for dysphoric mood and sleep disturbance. The patient is not nervous/anxious.        Objective:   Physical Exam  Constitutional:       General: He is not in acute distress.     Appearance: He is well-developed and well-groomed. He is obese.   HENT:      Right Ear: Tympanic membrane, ear canal and external ear normal.      Left Ear: Tympanic membrane, ear canal and external ear normal.      Nose: Nose normal.      Mouth/Throat:      Mouth: Mucous membranes are moist.      Pharynx: Oropharynx is clear.   Eyes:      Conjunctiva/sclera: Conjunctivae normal.      Pupils: Pupils are equal, round, and reactive to light.   Neck:      Thyroid: No thyromegaly.   Cardiovascular:      Rate and Rhythm: Normal rate. Rhythm irregular.      Heart sounds: Normal heart sounds.   Pulmonary:      Effort: Pulmonary effort is normal.      Breath sounds: Normal breath sounds.   Abdominal:      General: Bowel sounds are normal.      Palpations: Abdomen is soft.      Tenderness: There is no abdominal tenderness.   Musculoskeletal:         General: Normal range of motion.      Cervical back: Normal range of motion.   Skin:            Comments: 2 erythematous areas, on sacrum, areas do allie when palpated    Neurological:      General: No focal deficit present.       Mental Status: He is alert and oriented to person, place, and time.      Motor: Weakness (able to stand briefly for sacral exam, 2 staff members needed to help) present.      Gait: Gait abnormal (primarily in wheelchair).   Psychiatric:         Mood and Affect: Mood normal.     Bilateral barefoot skin diabetic exam is abnormal with dystrophic nails and/or dry skin. Monofilament testing normal    /82 (BP Location: Left arm, Patient Position: Sitting, Cuff Size: adult)   Pulse 88   Temp 97 °F (36.1 °C)   Resp 20   SpO2 97%  Estimated body mass index is 36.28 kg/m² as calculated from the following:    Height as of 7/20/23: 6' 1\" (1.854 m).    Weight as of 1/11/24: 275 lb (124.7 kg).    Medicare Hearing Assessment:   Hearing Screening    Time taken: 3/10/2025  2:27 PM  Screening Method: Finger Rub  Finger Rub Result: Fail       Visual Acuity:                     Able To Tolerate Visual Acuity: No (pt vision poor, unable to complete screen for vision exam.)        Assessment & Plan:   Everett Escobar is a 80 year old male who presents for a Medicare Assessment.     1. Encounter for annual health examination (Primary)  -     Hemoglobin A1C; Future; Expected date: 03/10/2025  -     TSH and Free T4; Future; Expected date: 03/10/2025  -     Comp Metabolic Panel (14); Future; Expected date: 03/10/2025  -     Microalb/Creat Ratio, Random Urine; Future; Expected date: 03/10/2025  -     CBC With Differential With Platelet; Future; Expected date: 03/10/2025  -     Hemoglobin A1C  -     TSH and Free T4  -     Comp Metabolic Panel (14)  -     CBC With Differential With Platelet  -     *Venipuncture  2. Type 2 diabetes mellitus with both eyes affected by moderate nonproliferative retinopathy and macular edema, with long-term current use of insulin ([facility])  Comments:  repeat A1C today, no recent hypoglycemia. Last A1C showed poorly controlled DM. Patient refusing endo evaluation. Does see eye specialist  Orders:  -      Hemoglobin A1C; Future; Expected date: 03/10/2025  -     Comp Metabolic Panel (14); Future; Expected date: 03/10/2025  -     Microalb/Creat Ratio, Random Urine; Future; Expected date: 03/10/2025  -     Lipid Panel; Future; Expected date: 03/10/2025  -     Hemoglobin A1C  -     Comp Metabolic Panel (14)  -     Lipid Panel  3. Diabetes mellitus with stage 3 chronic kidney disease (HCC)  Comments:  check lab work today, refusing nephrology evaluation  4. Hyperlipidemia associated with type 2 diabetes mellitus (HCC)  Comments:  check lab work today, tolerating statin, following with cardiology  Orders:  -     Comp Metabolic Panel (14); Future; Expected date: 03/10/2025  -     Lipid Panel; Future; Expected date: 03/10/2025  -     Comp Metabolic Panel (14)  -     Lipid Panel  5. Hypertension associated with diabetes (HCC)  Comments:  BP well controlled, following with cardiology  6. Mixed hyperlipidemia  Comments:  check lab work today, tolerating statin, following with cardiology  Orders:  -     Comp Metabolic Panel (14); Future; Expected date: 03/10/2025  -     Lipid Panel; Future; Expected date: 03/10/2025  -     Comp Metabolic Panel (14)  -     Lipid Panel  7. Asymptomatic bilateral carotid artery stenosis  Comments:  following with cardiology  8. Persistent atrial fibrillation (HCC)  Comments:  following with cardiology  Overview:  Taking asa, coumadin, amiodarone, cardizem, metoprolol. Managed by cardiology.     9. Long term (current) use of anticoagulants  Comments:  Managed by cardiology  10. Essential hypertension with goal blood pressure less than 140/90  11. Stage 3b chronic kidney disease (HCC)  Comments:  recheck lab work today  12. Skin ulcer of sacrum, limited to breakdown of skin (HCC)  Comments:  recommend resuming home health, order placed. In meantime, continue to keep area clean & dry. Apply Silvadene and keep covered. Appreciate home health input  Orders:  -     Home Health Referral - External  -      Silver sulfADIAZINE; Apply 1 Application topically daily.  Dispense: 150 Undefined; Refill: 0  13. Right hemiplegia (HCC)  -     Home Health Referral - External  14. Severe obesity (BMI 35.0-39.9) with comorbidity (HCC)  -     Home Health Referral - External  15. Mild episode of recurrent major depressive disorder  Comments:  stable on SSRI, CPM  16. Incontinence of feces, unspecified fecal incontinence type  17. Edema of extremities  Comments:  diuretics managed by cardiology. Encouraged patient to keep RUE elevated & take breaks from brace  18. Wheelchair dependent  -     Home Health Referral - External  19. Elevated PSA  Comments:  recheck level today. Would consider seeing urology  Orders:  -     PSA Total, Screen; Future; Expected date: 03/10/2025  -     PSA Total, Screen  -     Urology Referral - In Network  20. Urinary retention  Comments:  improved with Flomax  Orders:  -     Urology Referral - In Network  The patient indicates understanding of these issues and agrees to the plan.        Return in 3 months (on 6/10/2025).     Wendy Mckee, APRN, 3/10/2025     Supplementary Documentation:   General Health:  In the past six months, have you lost more than 10 pounds without trying?: 2 - No  Has your appetite been poor?: No  Type of Diet: Diabetic  How does the patient maintain a good energy level?: Other  How would you describe your daily physical activity?: Light  How would you describe your current health state?: Poor  How do you maintain positive mental well-being?: Visiting Friends  On a scale of 0 to 10, with 0 being no pain and 10 being severe pain, what is your pain level?: 6 - (Moderate)  In the past six months, have you experienced urine leakage?: 1-Yes  At any time do you feel concerned for the safety/well-being of yourself and/or your children, in your home or elsewhere?: No  Have you had any immunizations at another office such as Influenza, Hepatitis B, Tetanus, or Pneumococcal?: No    Health  Maintenance   Topic Date Due    COVID-19 Vaccine (2 - 2024-25 season) 09/01/2024    Influenza Vaccine (1) 10/01/2024    Annual Well Visit  01/01/2025    Annual Depression Screening  01/01/2025    Fall Risk Screening (Annual)  01/01/2025    Diabetes Care: Foot Exam (Annual)  01/01/2025    Diabetes Care: Microalb/Creat Ratio (Annual)  01/01/2025    Diabetes Care A1C  03/09/2025    Diabetes Care Dilated Eye Exam  05/07/2025    Zoster Vaccines (1 of 2) 06/01/2025 (Originally 3/5/1995)    Diabetes Care: GFR  12/09/2025    Pneumococcal Vaccine: 50+ Years  Completed    Meningococcal B Vaccine  Aged Out

## 2025-03-10 NOTE — TELEPHONE ENCOUNTER
April calling on behalf of patient; states that we just completed Family Medical Leave Act form however Bibiana is not accepting the electronic signature. Bibiana requesting the form to be signed by the provider and dated. Advised patient we will obtain a hand signature and fax it to Bibiana.

## 2025-03-11 ENCOUNTER — TELEPHONE (OUTPATIENT)
Dept: FAMILY MEDICINE CLINIC | Facility: CLINIC | Age: 80
End: 2025-03-11

## 2025-03-11 DIAGNOSIS — N18.30 DIABETES MELLITUS WITH STAGE 3 CHRONIC KIDNEY DISEASE (HCC): ICD-10-CM

## 2025-03-11 DIAGNOSIS — E11.3313 TYPE 2 DIABETES MELLITUS WITH BOTH EYES AFFECTED BY MODERATE NONPROLIFERATIVE RETINOPATHY AND MACULAR EDEMA, WITH LONG-TERM CURRENT USE OF INSULIN (HCC): Primary | ICD-10-CM

## 2025-03-11 DIAGNOSIS — Z79.4 TYPE 2 DIABETES MELLITUS WITH BOTH EYES AFFECTED BY MODERATE NONPROLIFERATIVE RETINOPATHY AND MACULAR EDEMA, WITH LONG-TERM CURRENT USE OF INSULIN (HCC): Primary | ICD-10-CM

## 2025-03-11 DIAGNOSIS — E11.22 DIABETES MELLITUS WITH STAGE 3 CHRONIC KIDNEY DISEASE (HCC): ICD-10-CM

## 2025-03-11 DIAGNOSIS — N18.32 STAGE 3B CHRONIC KIDNEY DISEASE (HCC): ICD-10-CM

## 2025-03-11 LAB
COMPLEXED PSA SERPL-MCNC: 7.02 NG/ML (ref ?–4)
EST. AVERAGE GLUCOSE BLD GHB EST-MCNC: 249 MG/DL (ref 68–126)
HBA1C MFR BLD: 10.3 % (ref ?–5.7)

## 2025-03-11 NOTE — TELEPHONE ENCOUNTER
Hand signed Family Medical Leave Act received and scanned into patient's chart. E-faxed to Bibiana [phone] awaiting confirmation. Confirmation received, Involvert message sent to patient.

## 2025-03-11 NOTE — TELEPHONE ENCOUNTER
----- Message from Wendy Mckee sent at 3/11/2025  7:52 AM CDT -----  Results reviewed.   PSA elevated, would recommend urology consult. Referred to Dr. Neville  Very poorly controlled diabetes. Please confirm current insulin dosing. Needs adjustments. He should see an endocrinologist  Worsening kidney function, now CKD 3b. Would recommend follow up with nephrology  No anemia  Thyroid function normal. Continue current dose of Levothyroxine  Cholesterol at goal    Repeat CMP, A1C in 3 months. Repeat lipid and TSH in 6 months

## 2025-03-12 ENCOUNTER — LAB ENCOUNTER (OUTPATIENT)
Dept: LAB | Age: 80
End: 2025-03-12
Attending: NURSE PRACTITIONER
Payer: MEDICARE

## 2025-03-12 DIAGNOSIS — Z79.4 TYPE 2 DIABETES MELLITUS WITH BOTH EYES AFFECTED BY MODERATE NONPROLIFERATIVE RETINOPATHY AND MACULAR EDEMA, WITH LONG-TERM CURRENT USE OF INSULIN (HCC): ICD-10-CM

## 2025-03-12 DIAGNOSIS — Z00.00 ENCOUNTER FOR ANNUAL HEALTH EXAMINATION: ICD-10-CM

## 2025-03-12 DIAGNOSIS — E11.3313 TYPE 2 DIABETES MELLITUS WITH BOTH EYES AFFECTED BY MODERATE NONPROLIFERATIVE RETINOPATHY AND MACULAR EDEMA, WITH LONG-TERM CURRENT USE OF INSULIN (HCC): ICD-10-CM

## 2025-03-12 LAB
CREAT UR-SCNC: 61.7 MG/DL
MICROALBUMIN UR-MCNC: 12.5 MG/DL
MICROALBUMIN/CREAT 24H UR-RTO: 202.6 UG/MG (ref ?–30)

## 2025-03-12 PROCEDURE — 82043 UR ALBUMIN QUANTITATIVE: CPT

## 2025-03-12 PROCEDURE — 82570 ASSAY OF URINE CREATININE: CPT

## 2025-03-12 NOTE — TELEPHONE ENCOUNTER
Granddaughter called in - has a urine for the urine micro lab.  Will bring in to the reference lab today.    Granddaughter (caregiver) states they did review the [facility] message and recommendations.    Patient has been wanting to do his own sliding scale insulin.  Does get 20 units of the Lantus Solostar nightly

## 2025-03-12 NOTE — TELEPHONE ENCOUNTER
Unfortunately his own sliding scale isn't working well  Increase Lantus to 22 units nightly and report FBS over next 3 days  Still consider endo evaluation

## 2025-03-13 ENCOUNTER — TELEPHONE (OUTPATIENT)
Dept: FAMILY MEDICINE CLINIC | Facility: CLINIC | Age: 80
End: 2025-03-13

## 2025-03-13 RX ORDER — MIRTAZAPINE 7.5 MG/1
7.5 TABLET, FILM COATED ORAL NIGHTLY
Qty: 90 TABLET | Refills: 3 | OUTPATIENT
Start: 2025-03-13

## 2025-03-13 NOTE — TELEPHONE ENCOUNTER
----- Message from Wendy Mckee sent at 3/13/2025  8:35 AM CDT -----  Results reviewed.   Significant microalbuminuria, related to poorly controlled diabetes

## 2025-03-13 NOTE — TELEPHONE ENCOUNTER
Urine micro results:    Your results showed significant microalbuminuria, related to poorly controlled diabetes. Please let us know if you have any questions.       Spoke with daughter - informed of need to increase nightly Lantus dosage to 22 units.  Urine micro results show poorly controlled diabetes.    Daughter asked if granddaughter (caregiver) also be called.  Message left on Tori's cell phone - [phone]. Ok to leave message per verbal release.   To increase Lantus to 22 units at night.    To call back with any questions

## 2025-04-10 RX ORDER — PANTOPRAZOLE SODIUM 40 MG/1
40 TABLET, DELAYED RELEASE ORAL
Qty: 90 TABLET | Refills: 3 | Status: SHIPPED | OUTPATIENT
Start: 2025-04-10

## 2025-04-10 NOTE — TELEPHONE ENCOUNTER
Gastrointestional Medication Protocol Passed      Last office visit 3/10/25  Last refilled on 1/27/25 for # 90 with 0 refills  No future appointments.     Thank you.

## 2025-04-11 DIAGNOSIS — E11.22 DIABETES MELLITUS WITH STAGE 3 CHRONIC KIDNEY DISEASE (HCC): ICD-10-CM

## 2025-04-11 DIAGNOSIS — I10 ESSENTIAL HYPERTENSION WITH GOAL BLOOD PRESSURE LESS THAN 140/90: ICD-10-CM

## 2025-04-11 DIAGNOSIS — N18.30 DIABETES MELLITUS WITH STAGE 3 CHRONIC KIDNEY DISEASE (HCC): ICD-10-CM

## 2025-04-11 DIAGNOSIS — E78.2 MIXED HYPERLIPIDEMIA: ICD-10-CM

## 2025-04-11 RX ORDER — INSULIN GLARGINE 100 [IU]/ML
22 INJECTION, SOLUTION SUBCUTANEOUS NIGHTLY
Qty: 15 ML | Refills: 0 | Status: SHIPPED | OUTPATIENT
Start: 2025-04-11

## 2025-04-11 NOTE — TELEPHONE ENCOUNTER
Last office visit 3/10/25  Last refilled on 9/14/23 for # 45ml with 0 refills  No future appointments.     Thank you.

## 2025-04-11 NOTE — TELEPHONE ENCOUNTER
Rx refill requested     LANTUS SOLOSTAR 100 UNIT/ML Subcutaneous Solution Pen-injector     [facility] - [place], [place] - [address] [phone], [phone]     Would like a call to know if would be filled

## 2025-04-28 DIAGNOSIS — L98.421 SKIN ULCER OF SACRUM, LIMITED TO BREAKDOWN OF SKIN (HCC): ICD-10-CM

## 2025-04-28 RX ORDER — SILVER SULFADIAZINE 10 MG/G
1 CREAM TOPICAL DAILY
Qty: 50 G | Refills: 0 | Status: SHIPPED | OUTPATIENT
Start: 2025-04-28 | End: 2025-07-27

## 2025-04-28 NOTE — TELEPHONE ENCOUNTER
silver sulfADIAZINE 1 % External Cream     [facility] requesting refill

## 2025-04-28 NOTE — TELEPHONE ENCOUNTER
Last office visit 3/10/25  Last refilled on 3/10/25 for # 150g with 0 refills  No future appointments.     Thank you.

## 2025-05-12 RX ORDER — TAMSULOSIN HYDROCHLORIDE 0.4 MG/1
0.4 CAPSULE ORAL DAILY
Qty: 90 CAPSULE | Refills: 0 | Status: SHIPPED | OUTPATIENT
Start: 2025-05-12

## 2025-05-12 RX ORDER — ATORVASTATIN CALCIUM 20 MG/1
20 TABLET, FILM COATED ORAL NIGHTLY
Qty: 90 TABLET | Refills: 0 | Status: SHIPPED | OUTPATIENT
Start: 2025-05-12

## 2025-05-12 RX ORDER — METOPROLOL SUCCINATE 25 MG/1
25 TABLET, EXTENDED RELEASE ORAL DAILY
Qty: 90 TABLET | Refills: 0 | Status: SHIPPED | OUTPATIENT
Start: 2025-05-12

## 2025-05-12 RX ORDER — FINASTERIDE 5 MG/1
5 TABLET, FILM COATED ORAL DAILY
Qty: 90 TABLET | Refills: 0 | Status: SHIPPED | OUTPATIENT
Start: 2025-05-12

## 2025-05-12 NOTE — TELEPHONE ENCOUNTER
Cholesterol Medication Protocol Passed          Hypertension Medications Protocol [id number]/11/2025 11:34 PM   Protocol Details Last BP reading less than 140/90    EGFRCR or GFRNAA > 50    CMP or BMP in past 12 months    In person appointment or virtual visit in the past 12 mos or appointment in next 3 mos    Medication is active on med list        BP Readings from Last 3 Encounters:   03/10/25 142/82   05/28/24 110/69   04/15/24 130/82     Last office visit 3/10/25  Metoprolol Last refilled on 12/20/24 for # 90 with 1 refills  Tamsulosin 8/2/24 90 3 refills  Atorvastatin 7/26/24 90 3 refills  No future appointments.     Drug-Drug: dilTIAZem HCl ER and atorvastatinPlasma concentrations and pharmacologic effects of atorvastatin may be increased by coadministration of diltiazem. The risk of myopathy and rhabdomyolysis may be increased.  Details  Override reason        atorvastatin 20 MG Oral Tab [Pharmacy Med Name: Atorvastatin Calcium Oral Tablet 20 MG]  Prescription. Reordered.  dilTIAZem HCl  MG Oral Tablet 24 HrPatient reported medication. Active.

## 2025-06-02 DIAGNOSIS — R79.89 ELEVATED TSH: ICD-10-CM

## 2025-06-02 RX ORDER — ACYCLOVIR 400 MG/1
1 TABLET ORAL
Qty: 6 EACH | Refills: 0 | Status: SHIPPED | OUTPATIENT
Start: 2025-06-02

## 2025-06-02 RX ORDER — LEVOTHYROXINE SODIUM 125 UG/1
125 TABLET ORAL
Qty: 90 TABLET | Refills: 0 | Status: SHIPPED | OUTPATIENT
Start: 2025-06-02 | End: 2025-08-31

## 2025-06-02 NOTE — TELEPHONE ENCOUNTER
Rx refill requested     Continuous Blood Gluc Sensor (DEXCOM G7 SENSOR) Does not apply Misc     levothyroxine 125 MCG Oral Tab       [facility] - [place], [place] - [address] [phone], [phone]

## 2025-06-02 NOTE — TELEPHONE ENCOUNTER
Diabetic Supplies Protocol Passed        Thyroid Medication Protocol [id number]/02/2025 09:32 AM   Protocol Details Medication is active on med list    TSH in past 12 months    Last TSH value is normal    In person appointment or virtual visit in the past 12 mos or appointment i   Last office visit 3/10/25  Last refilled on 12/11/24 for # 90 with 0 refills  No future appointments.     Thank you.

## 2025-06-06 RX ORDER — ACYCLOVIR 400 MG/1
TABLET ORAL
Qty: 6 EACH | Refills: 5 | OUTPATIENT
Start: 2025-06-06

## 2025-06-11 ENCOUNTER — TELEPHONE (OUTPATIENT)
Dept: FAMILY MEDICINE CLINIC | Facility: CLINIC | Age: 80
End: 2025-06-11

## 2025-06-17 ENCOUNTER — TELEPHONE (OUTPATIENT)
Dept: FAMILY MEDICINE CLINIC | Facility: CLINIC | Age: 80
End: 2025-06-17

## 2025-06-17 DIAGNOSIS — N39.498 OTHER URINARY INCONTINENCE: Primary | ICD-10-CM

## 2025-06-17 NOTE — TELEPHONE ENCOUNTER
Discussed with Dr. Hernandez - ok for daughter to bring in a urine sample. Then needs a video visit to discuss results.    Spoke with daughter - ok per verbal consent.  Daughter will stop by this evening or tomorrow morning to  urine kit.  Explained Dr. Hernandez would also like a video visit with patient after urine results.  Daughter could not do a video visit with patient tomorrow - could on Saturday.    Future Appointments   Date Time Provider Department Center   6/21/2025  8:20 AM Dennise Hernandez MD [facility]

## 2025-06-17 NOTE — TELEPHONE ENCOUNTER
Patient daughter called said she thinks that patient has a UTI would like to know if she can drop off a sample.     Per daughter she doesn't have transportation because it has to be scheduled ahead of time

## 2025-06-17 NOTE — TELEPHONE ENCOUNTER
Future Appointments   Date Time Provider Department Center   6/21/2025  8:20 AM Dennise Hernandez MD [facility]

## 2025-06-18 ENCOUNTER — NURSE ONLY (OUTPATIENT)
Dept: FAMILY MEDICINE CLINIC | Facility: CLINIC | Age: 80
End: 2025-06-18
Payer: MEDICARE

## 2025-06-18 DIAGNOSIS — R39.89 URINE DISCOLORATION: Primary | ICD-10-CM

## 2025-06-18 LAB
BILIRUBIN: NEGATIVE
GLUCOSE (URINE DIPSTICK): 500 MG/DL
KETONES (URINE DIPSTICK): NEGATIVE MG/DL
MULTISTIX LOT#: ABNORMAL NUMERIC
NITRITE, URINE: NEGATIVE
PH, URINE: 6.5 (ref 4.5–8)
SPECIFIC GRAVITY: 1.01 (ref 1–1.03)
URINE-COLOR: YELLOW
UROBILINOGEN,SEMI-QN: 0.2 MG/DL (ref 0–1.9)

## 2025-06-18 PROCEDURE — 87186 SC STD MICRODIL/AGAR DIL: CPT | Performed by: FAMILY MEDICINE

## 2025-06-18 PROCEDURE — 87086 URINE CULTURE/COLONY COUNT: CPT | Performed by: FAMILY MEDICINE

## 2025-06-18 PROCEDURE — 87088 URINE BACTERIA CULTURE: CPT | Performed by: FAMILY MEDICINE

## 2025-06-18 RX ORDER — CEPHALEXIN 500 MG/1
500 CAPSULE ORAL 3 TIMES DAILY
Qty: 21 CAPSULE | Refills: 0 | Status: SHIPPED | OUTPATIENT
Start: 2025-06-18 | End: 2025-06-25

## 2025-06-18 NOTE — PROGRESS NOTES
Daughter brought in urine sample from the patient.    States dad's urine has look discolored and foul-smelling.    Urine dip done in office.  Results given to Dr. Hernandez.    To start on Keflex 500mg three times a day.  To repeat urine/urine culture in 1 month      Spoke with daughter - informed of new prescription.  Daughter will keep Saturday appointment.  Unable to talk any longer to discuss repeat urine.    Future order placed for urine culture

## 2025-06-22 ENCOUNTER — TELEPHONE (OUTPATIENT)
Dept: FAMILY MEDICINE CLINIC | Facility: CLINIC | Age: 80
End: 2025-06-22

## 2025-06-22 DIAGNOSIS — E78.2 MIXED HYPERLIPIDEMIA: ICD-10-CM

## 2025-06-22 DIAGNOSIS — E11.22 DIABETES MELLITUS WITH STAGE 3 CHRONIC KIDNEY DISEASE (HCC): ICD-10-CM

## 2025-06-22 DIAGNOSIS — N18.30 DIABETES MELLITUS WITH STAGE 3 CHRONIC KIDNEY DISEASE (HCC): ICD-10-CM

## 2025-06-22 DIAGNOSIS — I10 ESSENTIAL HYPERTENSION WITH GOAL BLOOD PRESSURE LESS THAN 140/90: ICD-10-CM

## 2025-06-23 RX ORDER — INSULIN GLARGINE 100 [IU]/ML
22 INJECTION, SOLUTION SUBCUTANEOUS NIGHTLY
Qty: 15 ML | Refills: 3 | Status: SHIPPED | OUTPATIENT
Start: 2025-06-23

## 2025-06-23 NOTE — TELEPHONE ENCOUNTER
Diabetes Medication Protocol Xmijrf2506/22/2025 01:03 PM   Protocol Details Last A1C < 7.5 and within past 6 months    EGFRCR or GFRNAA > 50    In person appointment or virtual visit in the past 6 mos or appointment in next 3 mos    Microalbumin procedure in past 12 months or taking ACE/ARB    GFR in the past 12 months    Medication is active on med list      External CMP from 4/16/25  GFR was 35  Last A1c 10.3 on 3/10/25  Last refill 4/11/25 15ml 0 refill  Last office visit 3/10/25  No future appointments.

## 2025-06-23 NOTE — TELEPHONE ENCOUNTER
Meds refilled  He was schedule for uti follow up visit on Saturday but did not show up. How are his uti symptoms?

## 2025-07-25 RX ORDER — ATORVASTATIN CALCIUM 20 MG/1
20 TABLET, FILM COATED ORAL NIGHTLY
Qty: 90 TABLET | Refills: 0 | Status: SHIPPED | OUTPATIENT
Start: 2025-07-25

## 2025-07-25 RX ORDER — FINASTERIDE 5 MG/1
5 TABLET, FILM COATED ORAL DAILY
Qty: 90 TABLET | Refills: 1 | Status: SHIPPED | OUTPATIENT
Start: 2025-07-25

## 2025-07-25 RX ORDER — TAMSULOSIN HYDROCHLORIDE 0.4 MG/1
0.4 CAPSULE ORAL DAILY
Qty: 90 CAPSULE | Refills: 3 | Status: SHIPPED | OUTPATIENT
Start: 2025-07-25

## 2025-07-25 RX ORDER — METOPROLOL SUCCINATE 25 MG/1
25 TABLET, EXTENDED RELEASE ORAL DAILY
Qty: 90 TABLET | Refills: 3 | Status: SHIPPED | OUTPATIENT
Start: 2025-07-25

## 2025-07-25 NOTE — TELEPHONE ENCOUNTER
LOV: 3/10/25 for physical    finasteride 5 MG Oral Tab  TAKE 1 TABLET EVERY DAY Dispense: 90 tablet, Refills: 0 ordered        05/12/2025       Genitourinary Medications Oacokr2907/25/2025 01:35 AM   Protocol Details Patient does not have pulmonary hypertension on problem list    In person appointment or virtual visit in the past 12 mos or appointment in next 3 mos    Medication is active on med list     No future appointments.

## 2025-07-25 NOTE — TELEPHONE ENCOUNTER
Cholesterol Medication Protocol Passed   Hypertension Medications Protocol Ekhdec5107/25/2025 01:35 AM   Protocol Details Last BP reading less than 140/90    EGFRCR or GFRNAA > 50    CMP or BMP in past 12 months    In person appointment or virtual visit in the past 12 mos or appointment in next 3 mos    Medication is active on med list   Last office visit 3/10/25  Last refilled on 5/12/25 for # 90 with 0 refills  No future appointments.     Thank you.    BP Readings from Last 3 Encounters:   03/10/25 142/82   05/28/24 110/69   04/15/24 130/82

## 2025-08-15 DIAGNOSIS — R79.89 ELEVATED TSH: ICD-10-CM

## 2025-08-15 RX ORDER — LEVOTHYROXINE SODIUM 125 UG/1
125 TABLET ORAL
Qty: 90 TABLET | Refills: 0 | Status: SHIPPED | OUTPATIENT
Start: 2025-08-15 | End: 2025-11-13

## 2025-08-21 ENCOUNTER — TELEPHONE (OUTPATIENT)
Dept: FAMILY MEDICINE CLINIC | Facility: CLINIC | Age: 80
End: 2025-08-21

## 2025-08-25 ENCOUNTER — TELEPHONE (OUTPATIENT)
Dept: FAMILY MEDICINE CLINIC | Facility: CLINIC | Age: 80
End: 2025-08-25

## (undated) DIAGNOSIS — E78.2 MIXED HYPERLIPIDEMIA: ICD-10-CM

## (undated) DIAGNOSIS — I10 ESSENTIAL HYPERTENSION WITH GOAL BLOOD PRESSURE LESS THAN 140/90: ICD-10-CM

## (undated) DIAGNOSIS — E11.22 DIABETES MELLITUS WITH STAGE 3 CHRONIC KIDNEY DISEASE (HCC): ICD-10-CM

## (undated) DIAGNOSIS — N18.30 DIABETES MELLITUS WITH STAGE 3 CHRONIC KIDNEY DISEASE (HCC): ICD-10-CM

## (undated) DIAGNOSIS — I10 ESSENTIAL HYPERTENSION, BENIGN: ICD-10-CM

## (undated) NOTE — LETTER
06/11/25    Everett Escobar   [address] [place] [address]           Dear Everett Escobar     Our records indicate that you have outstanding lab work and/or testing that was ordered for you and has not yet been completed:  Lab Frequency Next Occurrence        Comp Metabolic Panel (14) [E] Once 06/11/2025             Hemoglobin A1C [E] Once 06/11/2025      To provide you with the best possible care, please complete these orders at your earliest convenience. If you have recently completed these orders please disregard this letter.   To schedule imaging, or outpatient tests please call Central Scheduling at [phone].  For any lab work needed, you can get this done at the Reference Lab in our [place] office anytime Monday-Friday from 7:30am-4:00pm and you do not need an appointment. They are closed for lunch between 12-1pm. They are closed Saturday and Sunday. If you need a time outside of these hours please call us to schedule an appointment. If you would like to schedule an appointment instead of doing a walk in you can call Central Scheduling at [phone].  *If you prefer to use [employer] for your labs please let us know so we can fax your orders.     Thank you,    [facility] [place]

## (undated) NOTE — LETTER
Date: 3/30/2018    Patient Name: Rosemary Sanchez          To Whom it may concern:    A manual wheelchair would not meet patient's mobility needs in the home because he lacks sufficient strength on one half of his body to propel himself.      Patient has a h

## (undated) NOTE — LETTER
02/21/25    Everett Escobar   [address] [place] [address]           Dear Everett Escobar     Our records indicate that you have outstanding lab work and/or testing that was ordered for you and has not yet been completed:  Lab Frequency Next Occurrence   TSH and Free T4 [E] Once 01/22/2025      To provide you with the best possible care, please complete these orders at your earliest convenience. If you have recently completed these orders please disregard this letter.   To schedule imaging, or outpatient tests please call Central Scheduling at [phone].  For any blood work needed, you can get this done at the Reference Lab in our [place] office anytime Monday-Friday from 7:30am-4:00pm and you do not need an appointment. They are closed for lunch between 12-1pm. They are closed Saturday and Sunday. If you need a time outside of these hours please call us to schedule an appointment.   *If you prefer to use [facility] for your labs please let us know so we can fax your orders.     Thank you,    [facility]

## (undated) NOTE — LETTER
10/24/24    Everett Escobar   [address] [place] [address]           Dear Everett Escobar     Our records indicate that you have outstanding lab work and/or testing that was ordered for you and has not yet been completed:  Lab Frequency Next Occurrence        TSH W Reflex To Free T4 Once 10/24/2024   Lipid Panel Once 10/24/2024   Hemoglobin A1C Once 10/24/2024   Comp Metabolic Panel (14) Once 10/24/2024      To provide you with the best possible care, please complete these orders at your earliest convenience. If you have recently completed these orders please disregard this letter.   To schedule imaging, or outpatient tests please call Central Scheduling at [phone].  For any blood work needed, you can get this done at the Reference Lab in our [place] office anytime Monday-Friday from 7:30am-4:00pm and you do not need an appointment. They are closed for lunch between 12-1pm. They are closed Saturday and Sunday. If you need a time outside of these hours please call us to schedule an appointment.   *If you prefer to use [employer] for your labs please let us know so we can fax your orders.     Thank you,    [facility] [place]

## (undated) NOTE — Clinical Note
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

## (undated) NOTE — LETTER
09/21/23    [place] [id number]           Dear [employer] records indicate that you have outstanding lab work and/or testing that was ordered for you and has not yet been completed:  Lab Frequency Next Occurrence             US BREAST BILATERAL COMPLETE (IBU=55834-15) Once 07/20/2023   SABINE ДМИТРИЙ 2D+3D DIAGNOSTIC SABINE  BILAT (CPT=77066/83159) Once 07/21/2023      To provide you with the best possible care, please complete these orders at your earliest convenience. If you have recently completed these orders please disregard this letter. To schedule please call Central Scheduling at [phone]. If you have any questions please call the office at [phone]. *If you prefer to use [facility] for your labs please let us know so we can fax your orders.     Thank you,    [facility]

## (undated) NOTE — LETTER
Date: 12/16/2022    Patient Name: Macho Cantor          To Whom it may concern: The above patient was seen at the [facility] for treatment of a medical condition. This patient has a diagnosis of type 2 Diabetes mellitus with stage 3 chronic kidney disease. It is recommended this patient test his blood sugar 4 times daily. Patient uses latus 40 units nighty plus insulin lispro sliding scale 3 times daily with meals.         Sincerely,      Nguyen Burgess MD

## (undated) NOTE — Clinical Note
FYI, TCM call made, see notes.  NCM attempted to schedule TCM HFU patient's wife states she will call to schedule Message sent to MD's office

## (undated) NOTE — Clinical Note
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

## (undated) NOTE — LETTER
Date: 8/19/2022    Patient Name: Farhat Sanchez        To Whom it may concern: The above is a patient at the [facility]. This patient has a medical condition which requires positioning of the body in ways not feasible with an ordinary bed.            Sincerely,      Gabriel Quintero MD

## (undated) NOTE — Clinical Note
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